# Patient Record
Sex: MALE | Race: WHITE | Employment: OTHER | URBAN - NONMETROPOLITAN AREA
[De-identification: names, ages, dates, MRNs, and addresses within clinical notes are randomized per-mention and may not be internally consistent; named-entity substitution may affect disease eponyms.]

---

## 2017-09-22 ENCOUNTER — HOSPITAL ENCOUNTER (OUTPATIENT)
Dept: MRI IMAGING | Age: 77
Discharge: HOME | End: 2017-09-22
Payer: MEDICARE

## 2017-09-22 DIAGNOSIS — S32.030D CLOSED WEDGE COMPRESSION FRACTURE OF THIRD LUMBAR VERTEBRA WITH ROUTINE HEALING: ICD-10-CM

## 2017-09-22 PROCEDURE — 72148 MRI LUMBAR SPINE W/O DYE: CPT

## 2017-10-04 ENCOUNTER — OFFICE VISIT (OUTPATIENT)
Dept: PHYSICAL MEDICINE AND REHAB | Age: 77
End: 2017-10-04
Payer: MEDICARE

## 2017-10-04 ENCOUNTER — HOSPITAL ENCOUNTER (OUTPATIENT)
Age: 77
Discharge: HOME | End: 2017-10-04
Payer: MEDICARE

## 2017-10-04 VITALS
HEART RATE: 84 BPM | HEIGHT: 74 IN | DIASTOLIC BLOOD PRESSURE: 73 MMHG | BODY MASS INDEX: 25.66 KG/M2 | WEIGHT: 199.96 LBS | SYSTOLIC BLOOD PRESSURE: 131 MMHG

## 2017-10-04 DIAGNOSIS — S32.030A COMPRESSION FRACTURE OF L3 LUMBAR VERTEBRA, CLOSED, INITIAL ENCOUNTER (HCC): ICD-10-CM

## 2017-10-04 DIAGNOSIS — M48.061 LUMBAR STENOSIS: ICD-10-CM

## 2017-10-04 DIAGNOSIS — M48.56XA COMPRESSION FRACTURE OF LUMBAR SPINE, NON-TRAUMATIC, INITIAL ENCOUNTER (HCC): ICD-10-CM

## 2017-10-04 DIAGNOSIS — M80.00XA AGE-RELATED OSTEOPOROSIS WITH CURRENT PATHOLOGICAL FRACTURE, INITIAL ENCOUNTER: ICD-10-CM

## 2017-10-04 DIAGNOSIS — M51.36 DEGENERATION OF LUMBAR INTERVERTEBRAL DISC: ICD-10-CM

## 2017-10-04 DIAGNOSIS — M48.56XA COMPRESSION FRACTURE OF LUMBAR SPINE, NON-TRAUMATIC, INITIAL ENCOUNTER (HCC): Primary | ICD-10-CM

## 2017-10-04 LAB
ALBUMIN SERPL-MCNC: 4.1 G/DL (ref 3.5–5.1)
ALP BLD-CCNC: 120 U/L (ref 38–126)
ALT SERPL-CCNC: 11 U/L (ref 11–66)
ANION GAP SERPL CALCULATED.3IONS-SCNC: 16 MEQ/L (ref 8–16)
ANISOCYTOSIS: ABNORMAL
AST SERPL-CCNC: 16 U/L (ref 5–40)
BASOPHILS # BLD: 0.3 %
BASOPHILS ABSOLUTE: 0 THOU/MM3 (ref 0–0.1)
BILIRUB SERPL-MCNC: 0.7 MG/DL (ref 0.3–1.2)
BUN BLDV-MCNC: 18 MG/DL (ref 7–22)
CALCIUM SERPL-MCNC: 10.1 MG/DL (ref 8.5–10.5)
CHLORIDE BLD-SCNC: 103 MEQ/L (ref 98–111)
CO2: 23 MEQ/L (ref 23–33)
CREAT SERPL-MCNC: 1.1 MG/DL (ref 0.4–1.2)
EOSINOPHIL # BLD: 0.7 %
EOSINOPHILS ABSOLUTE: 0.1 THOU/MM3 (ref 0–0.4)
GFR SERPL CREATININE-BSD FRML MDRD: 65 ML/MIN/1.73M2
GLUCOSE BLD-MCNC: 103 MG/DL (ref 70–108)
HCT VFR BLD CALC: 41.9 % (ref 42–52)
HEMOGLOBIN: 14.1 GM/DL (ref 14–18)
LYMPHOCYTES # BLD: 10.3 %
LYMPHOCYTES ABSOLUTE: 1.2 THOU/MM3 (ref 1–4.8)
MCH RBC QN AUTO: 30.8 PG (ref 27–31)
MCHC RBC AUTO-ENTMCNC: 33.6 GM/DL (ref 33–37)
MCV RBC AUTO: 91.6 FL (ref 80–94)
MONOCYTES # BLD: 5.5 %
MONOCYTES ABSOLUTE: 0.6 THOU/MM3 (ref 0.4–1.3)
NUCLEATED RED BLOOD CELLS: 0 /100 WBC
PDW BLD-RTO: 14.9 % (ref 11.5–14.5)
PLATELET # BLD: 246 THOU/MM3 (ref 130–400)
PMV BLD AUTO: 7.9 MCM (ref 7.4–10.4)
POTASSIUM SERPL-SCNC: 4.1 MEQ/L (ref 3.5–5.2)
RBC # BLD: 4.58 MILL/MM3 (ref 4.7–6.1)
RBC # BLD: NORMAL 10*6/UL
SEG NEUTROPHILS: 83.2 %
SEGMENTED NEUTROPHILS ABSOLUTE COUNT: 9.6 THOU/MM3 (ref 1.8–7.7)
SODIUM BLD-SCNC: 142 MEQ/L (ref 135–145)
TOTAL PROTEIN: 7.2 G/DL (ref 6.1–8)
WBC # BLD: 11.5 THOU/MM3 (ref 4.8–10.8)

## 2017-10-04 PROCEDURE — 85025 COMPLETE CBC W/AUTO DIFF WBC: CPT

## 2017-10-04 PROCEDURE — 36415 COLL VENOUS BLD VENIPUNCTURE: CPT

## 2017-10-04 PROCEDURE — 99205 OFFICE O/P NEW HI 60 MIN: CPT | Performed by: NURSE PRACTITIONER

## 2017-10-04 PROCEDURE — 93005 ELECTROCARDIOGRAM TRACING: CPT

## 2017-10-04 PROCEDURE — 80053 COMPREHEN METABOLIC PANEL: CPT

## 2017-10-04 RX ORDER — ACETAMINOPHEN AND CODEINE PHOSPHATE 300; 30 MG/1; MG/1
1 TABLET ORAL EVERY 8 HOURS PRN
Qty: 15 TABLET | Refills: 0 | Status: ON HOLD | OUTPATIENT
Start: 2017-10-04 | End: 2018-01-01 | Stop reason: HOSPADM

## 2017-10-04 RX ORDER — PANTOPRAZOLE SODIUM 40 MG/1
40 TABLET, DELAYED RELEASE ORAL DAILY
Status: ON HOLD | COMMUNITY
End: 2018-01-01

## 2017-10-04 RX ORDER — PREDNISONE 1 MG/1
10 TABLET ORAL DAILY
Status: ON HOLD | COMMUNITY
End: 2018-01-01 | Stop reason: SDUPTHER

## 2017-10-04 ASSESSMENT — ENCOUNTER SYMPTOMS
SHORTNESS OF BREATH: 1
ALLERGIC/IMMUNOLOGIC NEGATIVE: 1
GASTROINTESTINAL NEGATIVE: 1
BACK PAIN: 1

## 2017-10-04 NOTE — PROGRESS NOTES
[facility]  [facility]  [address] [facility]  Dept: [phone]  Dept Fax: [phone]  Loc: [phone]    Visit Date: 10/4/2017    Damon Hair is a 68 y.o. male who is referred for pain management evaluation and treatment per Dr. Dave Dears. CAGE and [facility]AGE[facility] Questions   1. In the last three months, have you felt you should cut down or stop drinking or using drugs? Yes []        No [x]     2. In the last three months, has anyone annoyed you or gotten on your nerves by telling you to cut down or stop drinking or using drugs? Yes []        No [x]     3. In the last three months, have you felt guilty or bad about how much you drink or use drugs? Yes []        No [x]     4. In the last three months, have you been waking up wanting to have an alcoholic drink or use drugs? Yes []        No [x]        Opioid Risk Tool:  Clinician Form       1. Family History of Substance Abuse: Female Male    Alcohol   []1   []3    Illegal drugs   []2   []3    Prescription drugs     []4   []4   2. Personal History of Substance Abuse:          Alcohol   []3   []3    Illegal drugs   []4   []4    Prescription drugs     []5   []5   3. Age (jos box if between 12 and 39):     []1   []1   4. History of Preadolescent Sexual Abuse:     []3   []0   5. Psychological Disease:      Attention deficit disorder, obsessive-compulsive disorder, bipolar, schizophrenia   []2   []2      Depression     []1   []1    Scoring Totals       Total Score  Low Risk  Moderate Risk  High Risk   Risk Category   0 - 3   4 - 7   8 or Above      Patient states symptoms interfere with:  A.  General Activity:  yes   B. Mood: yes    C. Walking Ability:   yes   D. Normal Work (Includes both work outside the home and housework):   yes    E.  Relations with Other People:  no   F. Sleep:   no   G.  Enjoyment of Life:  yes     HPI:     Chief Complaint: Lower back pain     HPI  Lower back pain- Started in never used smokeless tobacco. He reports that he drinks alcohol. He reports that he does not use illicit drugs. Medications    Current Outpatient Prescriptions:     pantoprazole (PROTONIX) 40 MG tablet, Take 40 mg by mouth daily, Disp: , Rfl:     predniSONE (DELTASONE) 5 MG tablet, Take 5 mg by mouth daily, Disp: , Rfl:     Mycophenolate Mofetil (CELLCEPT PO), Take by mouth daily, Disp: , Rfl:     acetaminophen-codeine (TYLENOL #3) 300-30 MG per tablet, Take 1 tablet by mouth every 8 hours as needed for Pain, Disp: 15 tablet, Rfl: 0    amLODIPine (NORVASC) 10 MG tablet, Take 10 mg by mouth daily. , Disp: , Rfl:     tiotropium (SPIRIVA HANDIHALER) 18 MCG inhalation capsule, Inhale 1 capsule into the lungs daily for 90 days. 1 capsule via inhalation daily. , Disp: 90 capsule, Rfl: 3    albuterol (PROAIR HFA) 108 (90 BASE) MCG/ACT inhaler, Inhale 2 puffs into the lungs every 6 hours as needed for Wheezing or Shortness of Breath. :, Disp: 1 Inhaler, Rfl: 11    Subjective:      Review of Systems   HENT: Negative. Eyes: Positive for visual disturbance. Wears glasses   Respiratory: Positive for shortness of breath. History of tobacco use    Cardiovascular: Negative. Gastrointestinal: Negative. Endocrine: Negative. Genitourinary: Negative. Musculoskeletal: Positive for arthralgias, back pain and gait problem. Skin: Negative. Allergic/Immunologic: Negative. Neurological: Positive for weakness. Negative for numbness. Hematological: Negative. Psychiatric/Behavioral: Negative. Objective:     Vitals:    10/04/17 0740   BP: 131/73   Site: Left Arm   Position: Sitting   Pulse: 84   Weight: 199 lb 15.3 oz (90.7 kg)   Height: 6' 2.02\" (1.88 m)       Physical Exam   Constitutional: He is oriented to person, place, and time. He appears well-developed and well-nourished. HENT:   Head: Normocephalic and atraumatic.    Right Ear: External ear normal.   Left Ear: External ear normal.   Mouth/Throat: Oropharynx is clear and moist.   Eyes: Conjunctivae and EOM are normal. Pupils are equal, round, and reactive to light. Right eye exhibits no discharge. Left eye exhibits no discharge. No scleral icterus. Neck: Normal range of motion. Neck supple. No tracheal deviation present. No thyromegaly present. Cardiovascular: Normal rate, regular rhythm, normal heart sounds and intact distal pulses. Exam reveals no gallop and no friction rub. No murmur heard. Pulmonary/Chest: Effort normal and breath sounds normal. No respiratory distress. He has no wheezes. Abdominal: Soft. Bowel sounds are normal. He exhibits no distension. There is no tenderness. Musculoskeletal: He exhibits tenderness. Right shoulder: He exhibits normal range of motion. Left shoulder: He exhibits no deformity. Right elbow: He exhibits normal range of motion. Left elbow: He exhibits normal range of motion. Right wrist: He exhibits normal range of motion. Left wrist: He exhibits normal range of motion. Right hip: He exhibits normal range of motion. Left hip: He exhibits normal range of motion. Right knee: No tenderness found. Left knee: No tenderness found. Cervical back: He exhibits normal range of motion and no bony tenderness. Thoracic back: He exhibits normal range of motion, no tenderness and no bony tenderness. Lumbar back: He exhibits decreased range of motion, tenderness, bony tenderness and pain. He exhibits no swelling and no edema. Right upper arm: He exhibits no tenderness and no bony tenderness. Left upper arm: He exhibits no tenderness and no bony tenderness. Left forearm: He exhibits no tenderness. Right upper leg: He exhibits no tenderness. Left upper leg: He exhibits no tenderness. Right lower leg: He exhibits no tenderness.         Left lower leg: He exhibits no tried:  · PT: Yes,  any benefit? No, how many weeks? 5 weeks, last date done: 8/2017  · NSAIDs: No  · Chiropractic: No  · Muscle relaxants: No  · Narcotics: No  · Spine surgeon consult: Yes    Meds. Prescribed:   Orders Placed This Encounter   Medications    acetaminophen-codeine (TYLENOL #3) 300-30 MG per tablet     Sig: Take 1 tablet by mouth every 8 hours as needed for Pain     Dispense:  15 tablet     Refill:  0       Return for Kyphoplasty @ L3. , follow up after procedure. Time spent with patient was 60 minutes  minutes more than 50% was spent  Counseling/coordinated the patient's care.     Electronically signed by Amanda Laurent CNP on 10/4/2017 at 8:28 AM

## 2017-10-05 LAB
EKG ATRIAL RATE: 82 BPM
EKG P AXIS: 57 DEGREES
EKG P-R INTERVAL: 172 MS
EKG Q-T INTERVAL: 390 MS
EKG QRS DURATION: 88 MS
EKG QTC CALCULATION (BAZETT): 455 MS
EKG R AXIS: -12 DEGREES
EKG T AXIS: 42 DEGREES
EKG VENTRICULAR RATE: 82 BPM

## 2017-10-20 ENCOUNTER — APPOINTMENT (OUTPATIENT)
Dept: GENERAL RADIOLOGY | Age: 77
End: 2017-10-20
Attending: PAIN MEDICINE
Payer: MEDICARE

## 2017-10-20 ENCOUNTER — ANESTHESIA EVENT (OUTPATIENT)
Dept: OPERATING ROOM | Age: 77
End: 2017-10-20
Payer: MEDICARE

## 2017-10-20 ENCOUNTER — ANESTHESIA (OUTPATIENT)
Dept: OPERATING ROOM | Age: 77
End: 2017-10-20
Payer: MEDICARE

## 2017-10-20 ENCOUNTER — HOSPITAL ENCOUNTER (OUTPATIENT)
Age: 77
Setting detail: OUTPATIENT SURGERY
Discharge: HOME | End: 2017-10-20
Attending: PAIN MEDICINE | Admitting: PAIN MEDICINE
Payer: MEDICARE

## 2017-10-20 VITALS
RESPIRATION RATE: 17 BRPM | DIASTOLIC BLOOD PRESSURE: 83 MMHG | OXYGEN SATURATION: 99 % | SYSTOLIC BLOOD PRESSURE: 159 MMHG

## 2017-10-20 VITALS
WEIGHT: 196.4 LBS | SYSTOLIC BLOOD PRESSURE: 117 MMHG | HEART RATE: 94 BPM | RESPIRATION RATE: 21 BRPM | TEMPERATURE: 98.3 F | BODY MASS INDEX: 25.21 KG/M2 | HEIGHT: 74 IN | OXYGEN SATURATION: 97 % | DIASTOLIC BLOOD PRESSURE: 62 MMHG

## 2017-10-20 PROCEDURE — 3700000001 HC ADD 15 MINUTES (ANESTHESIA): Performed by: PAIN MEDICINE

## 2017-10-20 PROCEDURE — C1713 ANCHOR/SCREW BN/BN,TIS/BN: HCPCS | Performed by: PAIN MEDICINE

## 2017-10-20 PROCEDURE — 3600000014 HC SURGERY LEVEL 4 ADDTL 15MIN: Performed by: PAIN MEDICINE

## 2017-10-20 PROCEDURE — 2500000003 HC RX 250 WO HCPCS: Performed by: NURSE ANESTHETIST, CERTIFIED REGISTERED

## 2017-10-20 PROCEDURE — 2720000010 HC SURG SUPPLY STERILE: Performed by: PAIN MEDICINE

## 2017-10-20 PROCEDURE — 2580000003 HC RX 258: Performed by: NURSE ANESTHETIST, CERTIFIED REGISTERED

## 2017-10-20 PROCEDURE — 7100000010 HC PHASE II RECOVERY - FIRST 15 MIN: Performed by: PAIN MEDICINE

## 2017-10-20 PROCEDURE — 2500000003 HC RX 250 WO HCPCS

## 2017-10-20 PROCEDURE — 88305 TISSUE EXAM BY PATHOLOGIST: CPT

## 2017-10-20 PROCEDURE — 22514 PERQ VERTEBRAL AUGMENTATION: CPT | Performed by: PAIN MEDICINE

## 2017-10-20 PROCEDURE — 7100000011 HC PHASE II RECOVERY - ADDTL 15 MIN: Performed by: PAIN MEDICINE

## 2017-10-20 PROCEDURE — 7100000000 HC PACU RECOVERY - FIRST 15 MIN: Performed by: PAIN MEDICINE

## 2017-10-20 PROCEDURE — 6360000002 HC RX W HCPCS

## 2017-10-20 PROCEDURE — 3700000000 HC ANESTHESIA ATTENDED CARE: Performed by: PAIN MEDICINE

## 2017-10-20 PROCEDURE — 3209999900 FLUORO FOR SURGICAL PROCEDURES

## 2017-10-20 PROCEDURE — 88311 DECALCIFY TISSUE: CPT

## 2017-10-20 PROCEDURE — 7100000001 HC PACU RECOVERY - ADDTL 15 MIN: Performed by: PAIN MEDICINE

## 2017-10-20 PROCEDURE — 3600000004 HC SURGERY LEVEL 4 BASE: Performed by: PAIN MEDICINE

## 2017-10-20 PROCEDURE — C1894 INTRO/SHEATH, NON-LASER: HCPCS | Performed by: PAIN MEDICINE

## 2017-10-20 PROCEDURE — C1889 IMPLANT/INSERT DEVICE, NOC: HCPCS | Performed by: PAIN MEDICINE

## 2017-10-20 PROCEDURE — 6360000002 HC RX W HCPCS: Performed by: NURSE ANESTHETIST, CERTIFIED REGISTERED

## 2017-10-20 PROCEDURE — 6360000004 HC RX CONTRAST MEDICATION: Performed by: PAIN MEDICINE

## 2017-10-20 DEVICE — BONE CEMENT C01B HV-R WITH MIXER  US
Type: IMPLANTABLE DEVICE | Status: FUNCTIONAL
Brand: KYPHON® HV-R® BONE CEMENT AND KYPHON® MIXER PACK

## 2017-10-20 RX ORDER — SODIUM CHLORIDE 9 MG/ML
INJECTION, SOLUTION INTRAVENOUS CONTINUOUS PRN
Status: DISCONTINUED | OUTPATIENT
Start: 2017-10-20 | End: 2017-10-20 | Stop reason: SDUPTHER

## 2017-10-20 RX ORDER — FENTANYL CITRATE 50 UG/ML
INJECTION, SOLUTION INTRAMUSCULAR; INTRAVENOUS PRN
Status: DISCONTINUED | OUTPATIENT
Start: 2017-10-20 | End: 2017-10-20 | Stop reason: SDUPTHER

## 2017-10-20 RX ORDER — ROCURONIUM BROMIDE 10 MG/ML
INJECTION, SOLUTION INTRAVENOUS PRN
Status: DISCONTINUED | OUTPATIENT
Start: 2017-10-20 | End: 2017-10-20 | Stop reason: SDUPTHER

## 2017-10-20 RX ORDER — LIDOCAINE HYDROCHLORIDE 20 MG/ML
INJECTION, SOLUTION INFILTRATION; PERINEURAL PRN
Status: DISCONTINUED | OUTPATIENT
Start: 2017-10-20 | End: 2017-10-20 | Stop reason: SDUPTHER

## 2017-10-20 RX ORDER — ONDANSETRON 2 MG/ML
INJECTION INTRAMUSCULAR; INTRAVENOUS PRN
Status: DISCONTINUED | OUTPATIENT
Start: 2017-10-20 | End: 2017-10-20 | Stop reason: SDUPTHER

## 2017-10-20 RX ORDER — DEXAMETHASONE SODIUM PHOSPHATE 4 MG/ML
INJECTION, SOLUTION INTRA-ARTICULAR; INTRALESIONAL; INTRAMUSCULAR; INTRAVENOUS; SOFT TISSUE PRN
Status: DISCONTINUED | OUTPATIENT
Start: 2017-10-20 | End: 2017-10-20 | Stop reason: SDUPTHER

## 2017-10-20 RX ORDER — HYDROCODONE BITARTRATE AND ACETAMINOPHEN 5; 325 MG/1; MG/1
TABLET ORAL
Qty: 45 TABLET | Refills: 0 | Status: SHIPPED | OUTPATIENT
Start: 2017-10-20 | End: 2018-01-01 | Stop reason: SDUPTHER

## 2017-10-20 RX ORDER — SUCCINYLCHOLINE CHLORIDE 20 MG/ML
INJECTION INTRAMUSCULAR; INTRAVENOUS PRN
Status: DISCONTINUED | OUTPATIENT
Start: 2017-10-20 | End: 2017-10-20 | Stop reason: SDUPTHER

## 2017-10-20 RX ORDER — NEOSTIGMINE METHYLSULFATE 1 MG/ML
INJECTION, SOLUTION INTRAVENOUS PRN
Status: DISCONTINUED | OUTPATIENT
Start: 2017-10-20 | End: 2017-10-20 | Stop reason: SDUPTHER

## 2017-10-20 RX ORDER — MIDAZOLAM HYDROCHLORIDE 1 MG/ML
INJECTION INTRAMUSCULAR; INTRAVENOUS PRN
Status: DISCONTINUED | OUTPATIENT
Start: 2017-10-20 | End: 2017-10-20 | Stop reason: SDUPTHER

## 2017-10-20 RX ORDER — PROPOFOL 10 MG/ML
INJECTION, EMULSION INTRAVENOUS PRN
Status: DISCONTINUED | OUTPATIENT
Start: 2017-10-20 | End: 2017-10-20 | Stop reason: SDUPTHER

## 2017-10-20 RX ORDER — GLYCOPYRROLATE 0.2 MG/ML
INJECTION INTRAMUSCULAR; INTRAVENOUS PRN
Status: DISCONTINUED | OUTPATIENT
Start: 2017-10-20 | End: 2017-10-20 | Stop reason: SDUPTHER

## 2017-10-20 RX ADMIN — HYDROCORTISONE SODIUM SUCCINATE 100 MG: 100 INJECTION, POWDER, FOR SOLUTION INTRAMUSCULAR; INTRAVENOUS at 07:48

## 2017-10-20 RX ADMIN — CEFAZOLIN SODIUM 2 G: 2 SOLUTION INTRAVENOUS at 07:51

## 2017-10-20 RX ADMIN — PHENYLEPHRINE HYDROCHLORIDE 100 MCG: 10 INJECTION INTRAMUSCULAR; INTRAVENOUS; SUBCUTANEOUS at 07:53

## 2017-10-20 RX ADMIN — PROPOFOL 120 MG: 10 INJECTION, EMULSION INTRAVENOUS at 07:45

## 2017-10-20 RX ADMIN — PHENYLEPHRINE HYDROCHLORIDE 100 MCG: 10 INJECTION INTRAMUSCULAR; INTRAVENOUS; SUBCUTANEOUS at 08:30

## 2017-10-20 RX ADMIN — ONDANSETRON 4 MG: 2 INJECTION INTRAMUSCULAR; INTRAVENOUS at 07:52

## 2017-10-20 RX ADMIN — Medication 5 MG: at 08:25

## 2017-10-20 RX ADMIN — SUCCINYLCHOLINE CHLORIDE 100 MG: 20 INJECTION, SOLUTION INTRAMUSCULAR; INTRAVENOUS at 07:45

## 2017-10-20 RX ADMIN — Medication 10 MG: at 08:09

## 2017-10-20 RX ADMIN — GLYCOPYRROLATE 0.4 MG: 0.2 INJECTION, SOLUTION INTRAMUSCULAR; INTRAVENOUS at 08:54

## 2017-10-20 RX ADMIN — Medication 20 MG: at 07:59

## 2017-10-20 RX ADMIN — PHENYLEPHRINE HYDROCHLORIDE 100 MCG: 10 INJECTION INTRAMUSCULAR; INTRAVENOUS; SUBCUTANEOUS at 08:05

## 2017-10-20 RX ADMIN — Medication 5 MG: at 08:30

## 2017-10-20 RX ADMIN — PHENYLEPHRINE HYDROCHLORIDE 100 MCG: 10 INJECTION INTRAMUSCULAR; INTRAVENOUS; SUBCUTANEOUS at 07:48

## 2017-10-20 RX ADMIN — FENTANYL CITRATE 100 MCG: 50 INJECTION INTRAMUSCULAR; INTRAVENOUS at 07:43

## 2017-10-20 RX ADMIN — MIDAZOLAM HYDROCHLORIDE 2 MG: 1 INJECTION, SOLUTION INTRAMUSCULAR; INTRAVENOUS at 07:43

## 2017-10-20 RX ADMIN — LIDOCAINE HYDROCHLORIDE 100 MG: 20 INJECTION, SOLUTION INFILTRATION; PERINEURAL at 07:45

## 2017-10-20 RX ADMIN — NEOSTIGMINE METHYLSULFATE 3 MG: 1 INJECTION, SOLUTION INTRAVENOUS at 08:54

## 2017-10-20 RX ADMIN — DEXAMETHASONE SODIUM PHOSPHATE 8 MG: 4 INJECTION, SOLUTION INTRAMUSCULAR; INTRAVENOUS at 07:52

## 2017-10-20 RX ADMIN — PHENYLEPHRINE HYDROCHLORIDE 100 MCG: 10 INJECTION INTRAMUSCULAR; INTRAVENOUS; SUBCUTANEOUS at 08:16

## 2017-10-20 RX ADMIN — SODIUM CHLORIDE: 9 INJECTION, SOLUTION INTRAVENOUS at 07:41

## 2017-10-20 RX ADMIN — PHENYLEPHRINE HYDROCHLORIDE 100 MCG: 10 INJECTION INTRAMUSCULAR; INTRAVENOUS; SUBCUTANEOUS at 08:25

## 2017-10-20 ASSESSMENT — PULMONARY FUNCTION TESTS
PIF_VALUE: 15
PIF_VALUE: 17
PIF_VALUE: 18
PIF_VALUE: 17
PIF_VALUE: 15
PIF_VALUE: 14
PIF_VALUE: 18
PIF_VALUE: 14
PIF_VALUE: 17
PIF_VALUE: 18
PIF_VALUE: 0
PIF_VALUE: 17
PIF_VALUE: 18
PIF_VALUE: 1
PIF_VALUE: 18
PIF_VALUE: 17
PIF_VALUE: 20
PIF_VALUE: 15
PIF_VALUE: 0
PIF_VALUE: 3
PIF_VALUE: 3
PIF_VALUE: 0
PIF_VALUE: 16
PIF_VALUE: 18
PIF_VALUE: 16
PIF_VALUE: 18
PIF_VALUE: 17
PIF_VALUE: 17
PIF_VALUE: 0
PIF_VALUE: 17
PIF_VALUE: 16
PIF_VALUE: 17
PIF_VALUE: 16
PIF_VALUE: 16
PIF_VALUE: 3
PIF_VALUE: 18
PIF_VALUE: 3
PIF_VALUE: 18
PIF_VALUE: 18
PIF_VALUE: 0
PIF_VALUE: 18
PIF_VALUE: 17
PIF_VALUE: 20
PIF_VALUE: 17
PIF_VALUE: 17
PIF_VALUE: 4
PIF_VALUE: 14
PIF_VALUE: 17
PIF_VALUE: 16
PIF_VALUE: 18
PIF_VALUE: 18
PIF_VALUE: 3
PIF_VALUE: 17
PIF_VALUE: 18
PIF_VALUE: 17
PIF_VALUE: 19
PIF_VALUE: 18
PIF_VALUE: 17
PIF_VALUE: 15
PIF_VALUE: 17
PIF_VALUE: 18
PIF_VALUE: 18
PIF_VALUE: 3
PIF_VALUE: 0
PIF_VALUE: 17
PIF_VALUE: 18
PIF_VALUE: 18

## 2017-10-20 ASSESSMENT — PAIN - FUNCTIONAL ASSESSMENT: PAIN_FUNCTIONAL_ASSESSMENT: 0-10

## 2017-10-20 ASSESSMENT — PAIN DESCRIPTION - DESCRIPTORS: DESCRIPTORS: ACHING

## 2017-10-20 ASSESSMENT — PAIN SCALES - GENERAL: PAINLEVEL_OUTOF10: 0

## 2017-10-20 NOTE — ANESTHESIA PRE PROCEDURE
Department of Anesthesiology  Preprocedure Note       Name:  Drake Acosta   Age:  68 y.o.  :  1940                                          MRN:  [id number]         Date:  10/20/2017      Surgeon: Anastasia Bedoya):  Remington Dolan MD    Procedure: Procedure(s):  KYPHOPLASTY @ L3    Medications prior to admission:   Prior to Admission medications    Medication Sig Start Date End Date Taking? Authorizing Provider   pantoprazole (PROTONIX) 40 MG tablet Take 40 mg by mouth daily   Yes Historical Provider, MD   predniSONE (DELTASONE) 5 MG tablet Take 5 mg by mouth daily   Yes Historical Provider, MD   Mycophenolate Mofetil (CELLCEPT PO) Take 1,000 mg by mouth 2 times daily    Yes Historical Provider, MD   acetaminophen-codeine (TYLENOL #3) 300-30 MG per tablet Take 1 tablet by mouth every 8 hours as needed for Pain 10/4/17  Yes Eb Moment Marzec, CNP   amLODIPine (NORVASC) 10 MG tablet Take 10 mg by mouth daily. Yes Historical Provider, MD   tiotropium (SPIRIVA HANDIHALER) 18 MCG inhalation capsule Inhale 1 capsule into the lungs daily for 90 days. 1 capsule via inhalation daily. 4/15/13 7/14/13  Manda Montez MD   albuterol (PROAIR HFA) 108 (90 BASE) MCG/ACT inhaler Inhale 2 puffs into the lungs every 6 hours as needed for Wheezing or Shortness of Breath. : 13  Manda Montez MD       Current medications:    No current facility-administered medications for this encounter.         Allergies:  No Known Allergies    Problem List:    Patient Active Problem List   Diagnosis Code    Cerumen impaction H61.20    ETD (eustachian tube dysfunction) H69.80    Chronic maxillary sinusitis J32.0    Deviated nasal septum J34.2       Past Medical History:        Diagnosis Date    Anemia     [facility] [address]     [facility]     GERD (gastroesophageal reflux disease)     High triglycerides     Hx of blood clots     Hypertension     [facility]uropathy ([facility]     Pulmonary fibrosis ([facility]        Past Surgical History:        Procedure Laterality Date    ABDOMINAL AORTIC ANEURYSM REPAIR  2000    COLONOSCOPY      HERNIA REPAIR      x3       Social History:    Social History   Substance Use Topics    Smoking status: Former Smoker     Years: 35.00     Quit date: 1/1/1989    Smokeless tobacco: Never Used    Alcohol use Yes      Comment: Seldomly                                Counseling given: Not Answered      Vital Signs (Current):   Vitals:    10/20/17 0634   BP: (!) 150/77   Pulse: 86   Resp: 16   Temp: 98 °F (36.7 °C)   TempSrc: Temporal   SpO2: 95%   Weight: 196 lb 6.4 oz (89.1 kg)   Height: 6' 2\" (1.88 m)                                              BP Readings from Last 3 Encounters:   10/20/17 (!) 150/77   10/04/17 131/73   02/06/15 122/66       NPO Status: Time of last liquid consumption: 0515 (sip of water with meds)                        Time of last solid consumption: 2000                        Date of last liquid consumption: 10/20/17                        Date of last solid food consumption: 10/20/17    BMI:   Wt Readings from Last 3 Encounters:   10/20/17 196 lb 6.4 oz (89.1 kg)   10/04/17 199 lb 15.3 oz (90.7 kg)   02/06/15 200 lb (90.7 kg)     Body mass index is 25.22 kg/m².     CBC:   Lab Results   Component Value Date    WBC 11.5 10/04/2017    RBC 4.58 10/04/2017    HGB 14.1 10/04/2017    HCT 41.9 10/04/2017    MCV 91.6 10/04/2017    RDW 14.9 10/04/2017     10/04/2017       CMP:   Lab Results   Component Value Date     10/04/2017    K 4.1 10/04/2017     10/04/2017    CO2 23 10/04/2017    BUN 18 10/04/2017    CREATININE 1.1 10/04/2017    LABGLOM 65 10/04/2017    GLUCOSE 103 10/04/2017    PROT 7.2 10/04/2017    CALCIUM 10.1 10/04/2017    BILITOT 0.7 10/04/2017    ALKPHOS 120 10/04/2017    AST 16 10/04/2017    ALT 11 10/04/2017       POC Tests: No results for input(s): POCGLU, POCNA, POCK, POCCL, POCBUN, POCHEMO,

## 2017-10-20 NOTE — OP NOTE
endotracheal anesthesia and then was placed on the Tr table with all pressure points well padded. To facilitate the procedure biplanar fluoroscopy was used and by adjusting the angles of fluoroscopy both AP and lateral views of corresponding vertebral body was optimized. Skin over the back was prepped with antiseptic solution and the procedure was done under strict aspetic precautions. Skin and deep tissues up to the periosteum of pedicle was infilitrated with  --ml of 0.5% Marcaine with epinephrine. Then using #11 blade a small skin incission was made. The the working canula with trochar in place was inserted such that it lies over the lateral aspect of the pedicle. Then it was tapped slowly through the pedicle under constant fluoroscopic surveillance making sure that the medial border of the pedicle was not violated at any time. Then a bone biopsy canula was inserted through the canula and a bone biopsy was obtained. Bone was curetted as it was sclerotic. Then the Kyphon bone tamp was inserted through the working canula. This was done bipedicularly in similar fashion. Good placements were confirmed with fluoroscopy. The bone tamps  were then serially inflated, to reexpand the vertebral bodies, and restore more normal anatomy. In the meantime polymethylmethacrylate was mixed as per the protocol and Kyphon bone fillers were filled with it. Then they were immersed in warm water to obtain adequate consistency. The bone tamps were withdrawn, and bone cement was placed in both balloon cavities, using fluoroscopic guidance. The following are the volumes of contrast used to inflate the bone tamps and the volume of bone cement injected:           Level L3  left--- ----3 Ml of Polymethyl methacrylate                  Once the cement had set and was seen to be in good position by fluoroscopy, the working canula and bone fillers were removed. Final hemostasis was secured by applying pressure.   The incision were closed with 3-0 monocryl , followed by sterile dressings. The patient was then turned supine onto the bed and awakened from anesthesia. The patient was extubated in the operating room, and taken to the recovery room in stable condition. The patient tolerated surgery without any complications. Estimated Blood Loss:   <10 ml. Sponge, needle, and instrument counts were correct at the end of the case.       Electronically signed by Ciera Simmons MD on 10/20/2017 at 9:29 AM

## 2017-10-20 NOTE — ANESTHESIA POSTPROCEDURE EVALUATION
Department of Anesthesiology  Postprocedure Note    Patient: Drake Acosta  MRN: [id number]  YOB: 1940  Date of evaluation: 10/20/2017  Time:  10:11 AM     Procedure Summary     Date:  10/20/17 Room / Location:  [facility] 02 / [address]    Anesthesia Start:  0741 Anesthesia Stop:  3439    Procedure:  KYPHOPLASTY @ L3 LEFT (N/A Back) Diagnosis:  (COMPRESSION FRACTURE LUMBAR SPINE)    Surgeon:  Remington Dolan MD Responsible Provider:  Nick Guillory MD    Anesthesia Type:  general ASA Status:  3          Anesthesia Type: general    Dionne Phase I: Dionne Score: 9    Dionne Phase II: Dionne Score: 10    Last vitals: Reviewed and per EMR flowsheets.        Anesthesia Post Evaluation    Patient location during evaluation: PACU  Patient participation: complete - patient participated  Level of consciousness: awake  Airway patency: patent  Nausea & Vomiting: no vomiting and no nausea  Complications: no  Cardiovascular status: hemodynamically stable  Respiratory status: acceptable  Hydration status: stable

## 2017-10-24 ENCOUNTER — TELEPHONE (OUTPATIENT)
Dept: PHYSICAL MEDICINE AND REHAB | Age: 77
End: 2017-10-24

## 2017-10-24 NOTE — TELEPHONE ENCOUNTER
Pt. Wife called and wanted to verify that having black stools would not be a side effect from the kyphoplasty that was performed on Friday 10/20/2017. Please advise.

## 2017-11-02 ENCOUNTER — OFFICE VISIT (OUTPATIENT)
Dept: PHYSICAL MEDICINE AND REHAB | Age: 77
End: 2017-11-02
Payer: MEDICARE

## 2017-11-02 VITALS
SYSTOLIC BLOOD PRESSURE: 145 MMHG | WEIGHT: 196.43 LBS | BODY MASS INDEX: 25.21 KG/M2 | HEIGHT: 74 IN | DIASTOLIC BLOOD PRESSURE: 79 MMHG | HEART RATE: 107 BPM

## 2017-11-02 DIAGNOSIS — M47.816 LUMBAR FACET ARTHROPATHY: ICD-10-CM

## 2017-11-02 DIAGNOSIS — M80.00XA AGE-RELATED OSTEOPOROSIS WITH CURRENT PATHOLOGICAL FRACTURE, INITIAL ENCOUNTER: ICD-10-CM

## 2017-11-02 DIAGNOSIS — M51.36 DEGENERATION OF LUMBAR INTERVERTEBRAL DISC: ICD-10-CM

## 2017-11-02 DIAGNOSIS — M48.56XA COMPRESSION FRACTURE OF LUMBAR SPINE, NON-TRAUMATIC, INITIAL ENCOUNTER (HCC): ICD-10-CM

## 2017-11-02 DIAGNOSIS — M47.816 SPONDYLOSIS OF LUMBAR REGION WITHOUT MYELOPATHY OR RADICULOPATHY: Primary | ICD-10-CM

## 2017-11-02 PROCEDURE — 99213 OFFICE O/P EST LOW 20 MIN: CPT | Performed by: NURSE PRACTITIONER

## 2017-11-02 ASSESSMENT — ENCOUNTER SYMPTOMS
ALLERGIC/IMMUNOLOGIC NEGATIVE: 1
BACK PAIN: 1
SHORTNESS OF BREATH: 1
GASTROINTESTINAL NEGATIVE: 1

## 2017-11-02 NOTE — PROGRESS NOTES
[facility]  [facility]  [address]  Dept: [phone]  Dept Fax: [phone]: [phone]    Visit Date: 11/2/2017    Functionality Assessment/Goals Worksheet     On a scale of 0 (Does not Interfere) to 10 (Completely Interferes)     1. Which number describes how during the past week pain has interfered with       the following:  A. General Activity:  9  B. Mood: 8  C. Walking Ability:  1  D. Normal Work (Includes both work outside the home and housework):  1  E. Relations with Other People:   1  F. Sleep:   1  G. Enjoyment of Life:   1    2. Patient Prefers to Take their Pain Medications:     []  On a regular basis   [x]  Only when necessary    []  Does not take pain medications    3. What are the Patient's Goals/Expectations for Visiting Pain Management? []  Learn about my pain    []  Receive Medication   []  Physical Therapy     []  Treat Depression   []  Receive Injections    []  Treat Sleep   []  Deal with Anxiety and Stress   []  Treat Opoid Dependence/Addiction   []  Other:      HPI:   Damon Hair is a 68 y.o. male is here today for    Chief Complaint:  Lower back pain     Wife present   HPI   FU Kyphoplasty at L3 from 10/20/2017. Receiving a lot of relief from Kyphoplasty of about 70%. Able to move better. Patient and wife very pleased. Not much concerns of pain. Continues to have some lower back pain radiating in buttocks. Had some starry black stools after procedure which resolved. Has been fatigued lately     Not using any pain medication   Kyphoplasty site checked no sign of infection and helping well   Medications reviewed. Patient denies  side effects with medications. He denies any ER visits since last visit. Pain scale with out pain medications is 3-4/10. L-MRI   There is an acute compression fracture of L3. The height loss is 75 %. There is a diffuse edema on the STIR sequence.  There is low signal in the T1-weighted images. There is no associated underlying mass lesion. There are no retropulsed bone fragments. The posterior cortex of L3 does bow into the spinal canal. There is mild spinal canal stenosis posterior to the L3 vertebral body. Abnormal signal does not extend into the pedicles.       The lumbar vertebral bodies are normally aligned.  There is no suspicious marrow signal. There are no other compression fractures.  No pars defects are noted.  There is mild disc desiccation at the L3-4 and L4-5 levels.       The distal spinal cord, conus medullaris and cauda equina are normal.        There are no gross abnormalities in the visualized aspects of the distal thoracic spine.       On the axial images, at L1-L2, there are mild facet degenerative changes. There is no spinal canal or foraminal stenosis.       At L2-L3, there are mild facet degenerative changes. There is some thickening of the ligamentum flavum. There is mild spinal canal stenosis. There is no focal disc abnormality. There is no significant foraminal stenosis.       At L3-L4, there is moderate stenosis of the thecal sac. This is stable. There is a diffusely bulging disc. There are facet degenerative changes. There is thickening of ligamentum flavum. There is moderate bilateral foraminal stenosis.       At L4-L5, there is diffuse disc bulge. There are moderate severity facet degenerative changes. There is moderate spinal canal stenosis. There is stenosis of the left lateral recess. There is mild right and moderate severity left foraminal stenosis.       At L5-S1, there are mild facet degenerative changes. There is no spinal canal stenosis. There is no foraminal stenosis.       There is some edema in the paraspinal soft tissues adjacent to the L3 vertebral body. This is seen on the axial T2-weighted images and best seen on the sagittal STIR sequence.           Impression       1. Acute compression fracture of L3 with 75 % height loss.  This 1 capsule via inhalation daily. , Disp: 90 capsule, Rfl: 3    albuterol (PROAIR HFA) 108 (90 BASE) MCG/ACT inhaler, Inhale 2 puffs into the lungs every 6 hours as needed for Wheezing or Shortness of Breath. :, Disp: 1 Inhaler, Rfl: 11    Subjective:      Review of Systems   HENT: Negative. Eyes: Positive for visual disturbance. Wears glasses   Respiratory: Positive for shortness of breath. History of tobacco use    Cardiovascular: Negative. Gastrointestinal: Negative. Endocrine: Negative. Genitourinary: Negative. Musculoskeletal: Positive for arthralgias, back pain, gait problem and myalgias. Skin: Negative. Allergic/Immunologic: Negative. Neurological: Positive for weakness. Negative for numbness. Hematological: Negative. Psychiatric/Behavioral: Negative. Objective:     Vitals:    11/02/17 1136   BP: (!) 145/79   Site: Left Arm   Position: Sitting   Pulse: 107   Weight: 196 lb 6.9 oz (89.1 kg)   Height: 6' 2.02\" (1.88 m)       Physical Exam   Constitutional: He is oriented to person, place, and time. He appears well-developed and well-nourished. HENT:   Head: Normocephalic and atraumatic. Right Ear: External ear normal.   Left Ear: External ear normal.   Mouth/Throat: Oropharynx is clear and moist.   Eyes: Conjunctivae and EOM are normal. Pupils are equal, round, and reactive to light. Right eye exhibits no discharge. Left eye exhibits no discharge. No scleral icterus. Neck: Normal range of motion. Neck supple. No tracheal deviation present. No thyromegaly present. Cardiovascular: Normal rate, regular rhythm, normal heart sounds and intact distal pulses. Exam reveals no gallop and no friction rub. No murmur heard. Pulmonary/Chest: Effort normal and breath sounds normal. No respiratory distress. He has no wheezes. Abdominal: Soft. Bowel sounds are normal. He exhibits no distension. There is no tenderness. Musculoskeletal: He exhibits tenderness. Right shoulder: He exhibits normal range of motion. Left shoulder: He exhibits no deformity. Right elbow: He exhibits normal range of motion. Left elbow: He exhibits normal range of motion. Right wrist: He exhibits normal range of motion. Left wrist: He exhibits normal range of motion. Right hip: He exhibits normal range of motion. Left hip: He exhibits normal range of motion. Right knee: No tenderness found. Left knee: No tenderness found. Cervical back: He exhibits normal range of motion and no bony tenderness. Thoracic back: He exhibits normal range of motion, no tenderness and no bony tenderness. Lumbar back: He exhibits decreased range of motion, tenderness, bony tenderness and pain. He exhibits no swelling and no edema. Right upper arm: He exhibits no tenderness and no bony tenderness. Left upper arm: He exhibits no tenderness and no bony tenderness. Left forearm: He exhibits no tenderness. Right upper leg: He exhibits no tenderness. Left upper leg: He exhibits no tenderness. Right lower leg: He exhibits no tenderness. Left lower leg: He exhibits no tenderness. Legs:  Decreased ROM lumbar spine    Neurological: He is alert and oriented to person, place, and time. He has normal strength and normal reflexes. A cranial nerve deficit and sensory deficit is present. Coordination normal.   Reflex Scores:       Tricep reflexes are 2+ on the right side and 2+ on the left side. Bicep reflexes are 2+ on the right side and 2+ on the left side. Brachioradialis reflexes are 2+ on the right side and 2+ on the left side. Patellar reflexes are 2+ on the right side and 2+ on the left side. Achilles reflexes are 2+ on the right side and 2+ on the left side. Skin: Skin is warm and dry. Psychiatric: He has a normal mood and affect. Assessment:     1. Spondylosis of lumbar region without myelopathy or radiculopathy    2. Degeneration of lumbar intervertebral disc    3. Lumbar facet arthropathy    4. Compression fracture of lumbar spine, non-traumatic, initial encounter ([facility] [address]    5. Age-related osteoporosis with current pathological fracture, initial encounter            Plan:      · OARRS reviewed. · Discussed long term side effects of medications. · Discussed tolerance, dependency and addiction. · Previous UDS reviewed. · Patient told can not receive any pain medications from any other source. · Discussed with pt.may not be pain free. · No evidence of abuse, diversion or aberrant behavior. · Medications and/or procedures improve function and quality of life. · Procedure notes reviewed in detail. · Receiving 70% relief of pain from Kyphoplasty at L3. · L-MRI reviewed   · Not interested in medications, continue tylenol prn   · Discussed Bilateral L-facet MBB for diagnostic and therapeutic relief. Procedure and risks discussed in detail with patient- pain tolerable would like to hold off at this time  · Pain is greatly improved. · Having fatigue, instructed to follow up with pcp. · Patient and wife would like to follow up prn- since patient is doing so well     Meds. Prescribed:   No orders of the defined types were placed in this encounter. Return if symptoms worsen or fail to improve.          Electronically signed by Billy Hernandez CNP on 11/2/2017 at 12:29 PM

## 2018-01-01 ENCOUNTER — APPOINTMENT (OUTPATIENT)
Dept: INTERVENTIONAL RADIOLOGY/VASCULAR | Age: 78
DRG: 853 | End: 2018-01-01
Attending: HOSPITALIST
Payer: MEDICARE

## 2018-01-01 ENCOUNTER — APPOINTMENT (OUTPATIENT)
Dept: MRI IMAGING | Age: 78
End: 2018-01-01
Payer: MEDICARE

## 2018-01-01 ENCOUNTER — APPOINTMENT (OUTPATIENT)
Dept: GENERAL RADIOLOGY | Age: 78
DRG: 853 | End: 2018-01-01
Attending: HOSPITALIST
Payer: MEDICARE

## 2018-01-01 ENCOUNTER — TELEPHONE (OUTPATIENT)
Dept: PHYSICAL MEDICINE AND REHAB | Age: 78
End: 2018-01-01

## 2018-01-01 ENCOUNTER — HOSPITAL ENCOUNTER (INPATIENT)
Age: 78
LOS: 2 days | DRG: 871 | End: 2018-12-22
Attending: FAMILY MEDICINE | Admitting: FAMILY MEDICINE
Payer: MEDICARE

## 2018-01-01 ENCOUNTER — HOSPITAL ENCOUNTER (INPATIENT)
Age: 78
LOS: 19 days | Discharge: HOSPICE | DRG: 853 | End: 2018-12-20
Attending: HOSPITALIST | Admitting: HOSPITALIST
Payer: MEDICARE

## 2018-01-01 ENCOUNTER — HOSPITAL ENCOUNTER (OUTPATIENT)
Age: 78
Setting detail: OUTPATIENT SURGERY
Discharge: HOME | End: 2018-10-01
Attending: PAIN MEDICINE | Admitting: PAIN MEDICINE
Payer: MEDICARE

## 2018-01-01 ENCOUNTER — APPOINTMENT (OUTPATIENT)
Dept: GENERAL RADIOLOGY | Age: 78
End: 2018-01-01
Attending: PAIN MEDICINE
Payer: MEDICARE

## 2018-01-01 ENCOUNTER — OFFICE VISIT (OUTPATIENT)
Dept: PHYSICAL MEDICINE AND REHAB | Age: 78
End: 2018-01-01
Payer: MEDICARE

## 2018-01-01 ENCOUNTER — ANESTHESIA (OUTPATIENT)
Dept: OPERATING ROOM | Age: 78
DRG: 853 | End: 2018-01-01
Payer: MEDICARE

## 2018-01-01 ENCOUNTER — APPOINTMENT (OUTPATIENT)
Dept: CT IMAGING | Age: 78
End: 2018-01-01
Payer: MEDICARE

## 2018-01-01 ENCOUNTER — ANESTHESIA (OUTPATIENT)
Dept: OPERATING ROOM | Age: 78
End: 2018-01-01
Payer: MEDICARE

## 2018-01-01 ENCOUNTER — HOSPITAL ENCOUNTER (OUTPATIENT)
Age: 78
Setting detail: OBSERVATION
Discharge: HOME | End: 2018-05-23
Attending: FAMILY MEDICINE | Admitting: ORTHOPAEDIC SURGERY
Payer: MEDICARE

## 2018-01-01 ENCOUNTER — HOSPITAL ENCOUNTER (OUTPATIENT)
Age: 78
Setting detail: OUTPATIENT SURGERY
Discharge: HOME | End: 2018-11-13
Attending: PAIN MEDICINE | Admitting: PAIN MEDICINE
Payer: MEDICARE

## 2018-01-01 ENCOUNTER — TELEPHONE (OUTPATIENT)
Dept: OPERATING ROOM | Age: 78
End: 2018-01-01

## 2018-01-01 ENCOUNTER — APPOINTMENT (OUTPATIENT)
Dept: CT IMAGING | Age: 78
DRG: 853 | End: 2018-01-01
Attending: HOSPITALIST
Payer: MEDICARE

## 2018-01-01 ENCOUNTER — ANESTHESIA EVENT (OUTPATIENT)
Dept: OPERATING ROOM | Age: 78
End: 2018-01-01
Payer: MEDICARE

## 2018-01-01 ENCOUNTER — HOSPITAL ENCOUNTER (OUTPATIENT)
Age: 78
Setting detail: OUTPATIENT SURGERY
Discharge: HOME | End: 2018-07-23
Attending: PAIN MEDICINE | Admitting: PAIN MEDICINE
Payer: MEDICARE

## 2018-01-01 ENCOUNTER — APPOINTMENT (OUTPATIENT)
Dept: NUCLEAR MEDICINE | Age: 78
DRG: 853 | End: 2018-01-01
Attending: HOSPITALIST
Payer: MEDICARE

## 2018-01-01 ENCOUNTER — ANESTHESIA EVENT (OUTPATIENT)
Dept: OPERATING ROOM | Age: 78
DRG: 853 | End: 2018-01-01
Payer: MEDICARE

## 2018-01-01 VITALS
BODY MASS INDEX: 24 KG/M2 | TEMPERATURE: 97.8 F | HEIGHT: 74 IN | RESPIRATION RATE: 16 BRPM | SYSTOLIC BLOOD PRESSURE: 166 MMHG | HEART RATE: 69 BPM | DIASTOLIC BLOOD PRESSURE: 87 MMHG | WEIGHT: 187 LBS | OXYGEN SATURATION: 98 %

## 2018-01-01 VITALS
BODY MASS INDEX: 23.72 KG/M2 | HEART RATE: 82 BPM | RESPIRATION RATE: 16 BRPM | WEIGHT: 184.8 LBS | OXYGEN SATURATION: 95 % | SYSTOLIC BLOOD PRESSURE: 133 MMHG | TEMPERATURE: 98.5 F | HEIGHT: 74 IN | DIASTOLIC BLOOD PRESSURE: 67 MMHG

## 2018-01-01 VITALS
HEART RATE: 94 BPM | TEMPERATURE: 99.4 F | DIASTOLIC BLOOD PRESSURE: 75 MMHG | OXYGEN SATURATION: 99 % | SYSTOLIC BLOOD PRESSURE: 141 MMHG | RESPIRATION RATE: 22 BRPM | WEIGHT: 178.1 LBS | HEIGHT: 72 IN | BODY MASS INDEX: 24.12 KG/M2

## 2018-01-01 VITALS
WEIGHT: 178 LBS | OXYGEN SATURATION: 98 % | HEART RATE: 104 BPM | RESPIRATION RATE: 12 BRPM | HEIGHT: 72 IN | SYSTOLIC BLOOD PRESSURE: 110 MMHG | TEMPERATURE: 97.7 F | DIASTOLIC BLOOD PRESSURE: 51 MMHG | BODY MASS INDEX: 24.11 KG/M2

## 2018-01-01 VITALS
DIASTOLIC BLOOD PRESSURE: 80 MMHG | HEIGHT: 74 IN | WEIGHT: 171 LBS | SYSTOLIC BLOOD PRESSURE: 136 MMHG | HEART RATE: 88 BPM | BODY MASS INDEX: 21.94 KG/M2

## 2018-01-01 VITALS
RESPIRATION RATE: 12 BRPM | DIASTOLIC BLOOD PRESSURE: 54 MMHG | TEMPERATURE: 98.2 F | SYSTOLIC BLOOD PRESSURE: 89 MMHG | OXYGEN SATURATION: 96 %

## 2018-01-01 VITALS
SYSTOLIC BLOOD PRESSURE: 137 MMHG | OXYGEN SATURATION: 99 % | DIASTOLIC BLOOD PRESSURE: 75 MMHG | RESPIRATION RATE: 20 BRPM

## 2018-01-01 VITALS
DIASTOLIC BLOOD PRESSURE: 87 MMHG | OXYGEN SATURATION: 99 % | RESPIRATION RATE: 9 BRPM | SYSTOLIC BLOOD PRESSURE: 180 MMHG

## 2018-01-01 VITALS
SYSTOLIC BLOOD PRESSURE: 119 MMHG | OXYGEN SATURATION: 97 % | WEIGHT: 187 LBS | HEART RATE: 79 BPM | TEMPERATURE: 97.6 F | RESPIRATION RATE: 16 BRPM | BODY MASS INDEX: 24 KG/M2 | DIASTOLIC BLOOD PRESSURE: 59 MMHG | HEIGHT: 74 IN

## 2018-01-01 VITALS
HEART RATE: 80 BPM | BODY MASS INDEX: 24.54 KG/M2 | TEMPERATURE: 97.7 F | HEIGHT: 74 IN | OXYGEN SATURATION: 96 % | SYSTOLIC BLOOD PRESSURE: 143 MMHG | WEIGHT: 191.19 LBS | DIASTOLIC BLOOD PRESSURE: 81 MMHG | RESPIRATION RATE: 16 BRPM

## 2018-01-01 VITALS
DIASTOLIC BLOOD PRESSURE: 75 MMHG | BODY MASS INDEX: 24 KG/M2 | SYSTOLIC BLOOD PRESSURE: 135 MMHG | HEART RATE: 76 BPM | WEIGHT: 187 LBS | HEIGHT: 74 IN

## 2018-01-01 VITALS
HEIGHT: 74 IN | BODY MASS INDEX: 25.15 KG/M2 | SYSTOLIC BLOOD PRESSURE: 145 MMHG | WEIGHT: 196 LBS | DIASTOLIC BLOOD PRESSURE: 80 MMHG | HEART RATE: 88 BPM

## 2018-01-01 VITALS
RESPIRATION RATE: 13 BRPM | OXYGEN SATURATION: 98 % | DIASTOLIC BLOOD PRESSURE: 70 MMHG | SYSTOLIC BLOOD PRESSURE: 161 MMHG

## 2018-01-01 DIAGNOSIS — M47.816 LUMBAR SPONDYLOSIS: Primary | ICD-10-CM

## 2018-01-01 DIAGNOSIS — M47.816 LUMBAR FACET ARTHROPATHY: ICD-10-CM

## 2018-01-01 DIAGNOSIS — G89.4 CHRONIC PAIN SYNDROME: Primary | ICD-10-CM

## 2018-01-01 DIAGNOSIS — G89.4 CHRONIC PAIN SYNDROME: ICD-10-CM

## 2018-01-01 DIAGNOSIS — M48.061 SPINAL STENOSIS OF LUMBAR REGION WITHOUT NEUROGENIC CLAUDICATION: ICD-10-CM

## 2018-01-01 DIAGNOSIS — M48.56XA COMPRESSION FRACTURE OF LUMBAR SPINE, NON-TRAUMATIC, INITIAL ENCOUNTER (HCC): ICD-10-CM

## 2018-01-01 DIAGNOSIS — Z00.6 EXAMINATION FOR NORMAL COMPARISON FOR CLINICAL RESEARCH: ICD-10-CM

## 2018-01-01 DIAGNOSIS — S32.030A COMPRESSION FRACTURE OF L3 LUMBAR VERTEBRA, CLOSED, INITIAL ENCOUNTER (HCC): ICD-10-CM

## 2018-01-01 DIAGNOSIS — M54.5 ACUTE MIDLINE LOW BACK PAIN, WITH SCIATICA PRESENCE UNSPECIFIED: Primary | ICD-10-CM

## 2018-01-01 DIAGNOSIS — M80.00XA AGE-RELATED OSTEOPOROSIS WITH CURRENT PATHOLOGICAL FRACTURE, INITIAL ENCOUNTER: ICD-10-CM

## 2018-01-01 DIAGNOSIS — M51.36 DEGENERATION OF LUMBAR INTERVERTEBRAL DISC: ICD-10-CM

## 2018-01-01 DIAGNOSIS — M47.816 SPONDYLOSIS OF LUMBAR REGION WITHOUT MYELOPATHY OR RADICULOPATHY: Primary | ICD-10-CM

## 2018-01-01 DIAGNOSIS — S32.030A CLOSED COMPRESSION FRACTURE OF THIRD LUMBAR VERTEBRA, INITIAL ENCOUNTER: ICD-10-CM

## 2018-01-01 LAB
ABO: NORMAL
ABSOLUTE RETIC #: 53 THOU/MM3 (ref 20–115)
ALBUMIN SERPL-MCNC: 1.9 G/DL (ref 3.5–5.1)
ALBUMIN SERPL-MCNC: 2.3 G/DL (ref 3.5–5.1)
ALBUMIN SERPL-MCNC: 2.4 G/DL (ref 3.5–5.1)
ALBUMIN SERPL-MCNC: 2.4 G/DL (ref 3.5–5.1)
ALBUMIN SERPL-MCNC: 2.5 G/DL (ref 3.5–5.1)
ALLEN TEST: ABNORMAL
ALLEN TEST: POSITIVE
ALP BLD-CCNC: 138 U/L (ref 38–126)
ALP BLD-CCNC: 164 U/L (ref 38–126)
ALP BLD-CCNC: 68 U/L (ref 38–126)
ALP BLD-CCNC: 71 U/L (ref 38–126)
ALT SERPL-CCNC: 11 U/L (ref 11–66)
ALT SERPL-CCNC: 25 U/L (ref 11–66)
ALT SERPL-CCNC: 30 U/L (ref 11–66)
ALT SERPL-CCNC: 8 U/L (ref 11–66)
AMYLASE: 71 U/L (ref 20–104)
ANION GAP SERPL CALCULATED.3IONS-SCNC: 10 MEQ/L (ref 8–16)
ANION GAP SERPL CALCULATED.3IONS-SCNC: 11 MEQ/L (ref 8–16)
ANION GAP SERPL CALCULATED.3IONS-SCNC: 12 MEQ/L (ref 8–16)
ANION GAP SERPL CALCULATED.3IONS-SCNC: 13 MEQ/L (ref 8–16)
ANION GAP SERPL CALCULATED.3IONS-SCNC: 13 MEQ/L (ref 8–16)
ANION GAP SERPL CALCULATED.3IONS-SCNC: 14 MEQ/L (ref 8–16)
ANION GAP SERPL CALCULATED.3IONS-SCNC: 15 MEQ/L (ref 8–16)
ANION GAP SERPL CALCULATED.3IONS-SCNC: 15 MEQ/L (ref 8–16)
ANION GAP SERPL CALCULATED.3IONS-SCNC: 7 MEQ/L (ref 8–16)
ANION GAP SERPL CALCULATED.3IONS-SCNC: 9 MEQ/L (ref 8–16)
ANION GAP SERPL CALCULATED.3IONS-SCNC: NORMAL MEQ/L (ref 8–16)
ANISOCYTOSIS: ABNORMAL
ANTIBODY SCREEN: NORMAL
APTT: 31.8 SECONDS (ref 22–38)
APTT: 36.7 SECONDS (ref 22–38)
AST SERPL-CCNC: 17 U/L (ref 5–40)
AST SERPL-CCNC: 27 U/L (ref 5–40)
AST SERPL-CCNC: 32 U/L (ref 5–40)
AST SERPL-CCNC: 33 U/L (ref 5–40)
BACTERIA: ABNORMAL /HPF
BAL CHARACTER: ABNORMAL
BAL COLLECTION SITE: ABNORMAL
BAL COLOR: COLORLESS
BASE EXCESS (CALCULATED): -0.4 MMOL/L (ref -2.5–2.5)
BASE EXCESS (CALCULATED): -0.7 MMOL/L (ref -2.5–2.5)
BASE EXCESS (CALCULATED): -1.7 MMOL/L (ref -2.5–2.5)
BASE EXCESS (CALCULATED): -2.7 MMOL/L (ref -2.5–2.5)
BASE EXCESS (CALCULATED): -6.5 MMOL/L (ref -2.5–2.5)
BASE EXCESS (CALCULATED): -6.7 MMOL/L (ref -2.5–2.5)
BASE EXCESS (CALCULATED): 0.4 MMOL/L (ref -2.5–2.5)
BASE EXCESS (CALCULATED): 0.6 MMOL/L (ref -2.5–2.5)
BASE EXCESS (CALCULATED): 1 MMOL/L (ref -2.5–2.5)
BASE EXCESS (CALCULATED): 1.4 MMOL/L (ref -2.5–2.5)
BASOPHILS # BLD: 0.1 %
BASOPHILS # BLD: 0.2 %
BASOPHILS # BLD: 0.3 %
BASOPHILS # BLD: 0.4 %
BASOPHILS ABSOLUTE: 0 THOU/MM3 (ref 0–0.1)
BASOPHILS ABSOLUTE: 0.1 THOU/MM3 (ref 0–0.1)
BILIRUB SERPL-MCNC: 0.3 MG/DL (ref 0.3–1.2)
BILIRUB SERPL-MCNC: 0.4 MG/DL (ref 0.3–1.2)
BILIRUB SERPL-MCNC: 0.8 MG/DL (ref 0.3–1.2)
BILIRUB SERPL-MCNC: 0.8 MG/DL (ref 0.3–1.2)
BILIRUBIN DIRECT: < 0.2 MG/DL (ref 0–0.3)
BILIRUBIN URINE: NEGATIVE
BLOOD CULTURE, ROUTINE: NORMAL
BLOOD, URINE: NEGATIVE
BUN BLDV-MCNC: 16 MG/DL (ref 7–22)
BUN BLDV-MCNC: 17 MG/DL (ref 7–22)
BUN BLDV-MCNC: 18 MG/DL (ref 7–22)
BUN BLDV-MCNC: 22 MG/DL (ref 7–22)
BUN BLDV-MCNC: 22 MG/DL (ref 7–22)
BUN BLDV-MCNC: 23 MG/DL (ref 7–22)
BUN BLDV-MCNC: 24 MG/DL (ref 7–22)
BUN BLDV-MCNC: 24 MG/DL (ref 7–22)
BUN BLDV-MCNC: 25 MG/DL (ref 7–22)
BUN BLDV-MCNC: 26 MG/DL (ref 7–22)
BUN BLDV-MCNC: 27 MG/DL (ref 7–22)
BUN BLDV-MCNC: 28 MG/DL (ref 7–22)
BUN BLDV-MCNC: 28 MG/DL (ref 7–22)
BUN BLDV-MCNC: 29 MG/DL (ref 7–22)
BUN BLDV-MCNC: 29 MG/DL (ref 7–22)
BUN BLDV-MCNC: 30 MG/DL (ref 7–22)
BUN BLDV-MCNC: 31 MG/DL (ref 7–22)
BUN BLDV-MCNC: 32 MG/DL (ref 7–22)
BUN BLDV-MCNC: 32 MG/DL (ref 7–22)
BUN BLDV-MCNC: 33 MG/DL (ref 7–22)
BUN BLDV-MCNC: 34 MG/DL (ref 7–22)
BUN BLDV-MCNC: 39 MG/DL (ref 7–22)
BUN BLDV-MCNC: 53 MG/DL (ref 7–22)
BUN BLDV-MCNC: 54 MG/DL (ref 7–22)
BUN BLDV-MCNC: 66 MG/DL (ref 7–22)
BUN BLDV-MCNC: NORMAL MG/DL (ref 7–22)
CALCIUM IONIZED SERUM: 1.18 MMOL/L (ref 1.12–1.32)
CALCIUM IONIZED: 1.02 MMOL/L (ref 1.12–1.32)
CALCIUM IONIZED: 1.11 MMOL/L (ref 1.12–1.32)
CALCIUM IONIZED: 1.14 MMOL/L (ref 1.12–1.32)
CALCIUM IONIZED: 1.16 MMOL/L (ref 1.12–1.32)
CALCIUM IONIZED: 1.18 MMOL/L (ref 1.12–1.32)
CALCIUM IONIZED: 1.23 MMOL/L (ref 1.12–1.32)
CALCIUM IONIZED: 1.24 MMOL/L (ref 1.12–1.32)
CALCIUM IONIZED: 1.24 MMOL/L (ref 1.12–1.32)
CALCIUM IONIZED: 1.25 MMOL/L (ref 1.12–1.32)
CALCIUM IONIZED: 1.26 MMOL/L (ref 1.12–1.32)
CALCIUM IONIZED: 1.27 MMOL/L (ref 1.12–1.32)
CALCIUM IONIZED: 1.28 MMOL/L (ref 1.12–1.32)
CALCIUM IONIZED: 1.3 MMOL/L (ref 1.12–1.32)
CALCIUM IONIZED: 1.3 MMOL/L (ref 1.12–1.32)
CALCIUM IONIZED: 1.31 MMOL/L (ref 1.12–1.32)
CALCIUM IONIZED: 1.31 MMOL/L (ref 1.12–1.32)
CALCIUM IONIZED: 1.43 MMOL/L (ref 1.12–1.32)
CALCIUM SERPL-MCNC: 7.4 MG/DL (ref 8.5–10.5)
CALCIUM SERPL-MCNC: 7.6 MG/DL (ref 8.5–10.5)
CALCIUM SERPL-MCNC: 7.9 MG/DL (ref 8.5–10.5)
CALCIUM SERPL-MCNC: 8.1 MG/DL (ref 8.5–10.5)
CALCIUM SERPL-MCNC: 8.2 MG/DL (ref 8.5–10.5)
CALCIUM SERPL-MCNC: 8.3 MG/DL (ref 8.5–10.5)
CALCIUM SERPL-MCNC: 8.5 MG/DL (ref 8.5–10.5)
CALCIUM SERPL-MCNC: 8.6 MG/DL (ref 8.5–10.5)
CALCIUM SERPL-MCNC: 8.7 MG/DL (ref 8.5–10.5)
CALCIUM SERPL-MCNC: 8.8 MG/DL (ref 8.5–10.5)
CALCIUM SERPL-MCNC: 8.9 MG/DL (ref 8.5–10.5)
CALCIUM SERPL-MCNC: 8.9 MG/DL (ref 8.5–10.5)
CALCIUM SERPL-MCNC: 9 MG/DL (ref 8.5–10.5)
CALCIUM SERPL-MCNC: 9.2 MG/DL (ref 8.5–10.5)
CALCIUM SERPL-MCNC: 9.2 MG/DL (ref 8.5–10.5)
CALCIUM SERPL-MCNC: 9.3 MG/DL (ref 8.5–10.5)
CALCIUM SERPL-MCNC: 9.4 MG/DL (ref 8.5–10.5)
CALCIUM SERPL-MCNC: 9.4 MG/DL (ref 8.5–10.5)
CALCIUM SERPL-MCNC: NORMAL MG/DL (ref 8.5–10.5)
CASTS 2: ABNORMAL /LPF
CASTS UA: ABNORMAL /LPF
CHARACTER, URINE: CLEAR
CHLORIDE BLD-SCNC: 103 MEQ/L (ref 98–111)
CHLORIDE BLD-SCNC: 103 MEQ/L (ref 98–111)
CHLORIDE BLD-SCNC: 104 MEQ/L (ref 98–111)
CHLORIDE BLD-SCNC: 105 MEQ/L (ref 98–111)
CHLORIDE BLD-SCNC: 106 MEQ/L (ref 98–111)
CHLORIDE BLD-SCNC: 107 MEQ/L (ref 98–111)
CHLORIDE BLD-SCNC: 108 MEQ/L (ref 98–111)
CHLORIDE BLD-SCNC: 108 MEQ/L (ref 98–111)
CHLORIDE BLD-SCNC: 109 MEQ/L (ref 98–111)
CHLORIDE BLD-SCNC: 110 MEQ/L (ref 98–111)
CHLORIDE BLD-SCNC: 111 MEQ/L (ref 98–111)
CHLORIDE BLD-SCNC: 112 MEQ/L (ref 98–111)
CHLORIDE BLD-SCNC: 112 MEQ/L (ref 98–111)
CHLORIDE BLD-SCNC: 114 MEQ/L (ref 98–111)
CHLORIDE BLD-SCNC: 114 MEQ/L (ref 98–111)
CHLORIDE BLD-SCNC: 115 MEQ/L (ref 98–111)
CHLORIDE BLD-SCNC: 115 MEQ/L (ref 98–111)
CHLORIDE BLD-SCNC: 117 MEQ/L (ref 98–111)
CHLORIDE BLD-SCNC: 120 MEQ/L (ref 98–111)
CHLORIDE BLD-SCNC: NORMAL MEQ/L (ref 98–111)
CMV BY PCR, QUALITATIVE BLOOD: DETECTED
CO2: 16 MEQ/L (ref 23–33)
CO2: 20 MEQ/L (ref 23–33)
CO2: 20 MEQ/L (ref 23–33)
CO2: 21 MEQ/L (ref 23–33)
CO2: 21 MEQ/L (ref 23–33)
CO2: 22 MEQ/L (ref 23–33)
CO2: 23 MEQ/L (ref 23–33)
CO2: 24 MEQ/L (ref 23–33)
CO2: 25 MEQ/L (ref 23–33)
CO2: 26 MEQ/L (ref 23–33)
CO2: 26 MEQ/L (ref 23–33)
CO2: 27 MEQ/L (ref 23–33)
CO2: NORMAL MEQ/L (ref 23–33)
COLLECTED BY:: ABNORMAL
COLOR: YELLOW
CREAT SERPL-MCNC: 0.7 MG/DL (ref 0.4–1.2)
CREAT SERPL-MCNC: 0.8 MG/DL (ref 0.4–1.2)
CREAT SERPL-MCNC: 0.8 MG/DL (ref 0.4–1.2)
CREAT SERPL-MCNC: 0.9 MG/DL (ref 0.4–1.2)
CREAT SERPL-MCNC: 1 MG/DL (ref 0.4–1.2)
CREAT SERPL-MCNC: 1.1 MG/DL (ref 0.4–1.2)
CREAT SERPL-MCNC: 1.2 MG/DL (ref 0.4–1.2)
CREAT SERPL-MCNC: 1.2 MG/DL (ref 0.4–1.2)
CREAT SERPL-MCNC: 1.4 MG/DL (ref 0.4–1.2)
CREAT SERPL-MCNC: 1.5 MG/DL (ref 0.4–1.2)
CREAT SERPL-MCNC: 1.6 MG/DL (ref 0.4–1.2)
CREAT SERPL-MCNC: NORMAL MG/DL (ref 0.4–1.2)
CRYSTALS, UA: ABNORMAL
CYTOLOGY-NON GYN: NORMAL
CYTOMEGALOVIRUS (CMV) CULTURE: NORMAL
DEVICE: ABNORMAL
EKG ATRIAL RATE: 117 BPM
EKG ATRIAL RATE: 124 BPM
EKG ATRIAL RATE: 90 BPM
EKG ATRIAL RATE: 92 BPM
EKG P AXIS: 41 DEGREES
EKG P AXIS: 51 DEGREES
EKG P AXIS: 59 DEGREES
EKG P-R INTERVAL: 132 MS
EKG P-R INTERVAL: 134 MS
EKG P-R INTERVAL: 162 MS
EKG Q-T INTERVAL: 288 MS
EKG Q-T INTERVAL: 314 MS
EKG Q-T INTERVAL: 316 MS
EKG Q-T INTERVAL: 370 MS
EKG QRS DURATION: 74 MS
EKG QRS DURATION: 78 MS
EKG QRS DURATION: 84 MS
EKG QRS DURATION: 96 MS
EKG QTC CALCULATION (BAZETT): 438 MS
EKG QTC CALCULATION (BAZETT): 452 MS
EKG QTC CALCULATION (BAZETT): 453 MS
EKG QTC CALCULATION (BAZETT): 468 MS
EKG R AXIS: -12 DEGREES
EKG R AXIS: -28 DEGREES
EKG R AXIS: -38 DEGREES
EKG R AXIS: -41 DEGREES
EKG T AXIS: 29 DEGREES
EKG T AXIS: 33 DEGREES
EKG T AXIS: 63 DEGREES
EKG T AXIS: 66 DEGREES
EKG VENTRICULAR RATE: 117 BPM
EKG VENTRICULAR RATE: 124 BPM
EKG VENTRICULAR RATE: 159 BPM
EKG VENTRICULAR RATE: 90 BPM
EOSINOPHIL # BLD: 0.4 %
EOSINOPHIL # BLD: 0.8 %
EOSINOPHIL # BLD: 1.8 %
EOSINOPHIL # BLD: 2.6 %
EOSINOPHILS ABSOLUTE: 0 THOU/MM3 (ref 0–0.4)
EOSINOPHILS ABSOLUTE: 0.1 THOU/MM3 (ref 0–0.4)
EOSINOPHILS ABSOLUTE: 0.2 THOU/MM3 (ref 0–0.4)
EOSINOPHILS ABSOLUTE: 0.3 THOU/MM3 (ref 0–0.4)
EPITHELIAL CELLS, UA: ABNORMAL /HPF
ERYTHROCYTE [DISTWIDTH] IN BLOOD BY AUTOMATED COUNT: 14.5 % (ref 11.5–14.5)
ERYTHROCYTE [DISTWIDTH] IN BLOOD BY AUTOMATED COUNT: 14.7 % (ref 11.5–14.5)
ERYTHROCYTE [DISTWIDTH] IN BLOOD BY AUTOMATED COUNT: 14.8 % (ref 11.5–14.5)
ERYTHROCYTE [DISTWIDTH] IN BLOOD BY AUTOMATED COUNT: 15 % (ref 11.5–14.5)
ERYTHROCYTE [DISTWIDTH] IN BLOOD BY AUTOMATED COUNT: 15.1 % (ref 11.5–14.5)
ERYTHROCYTE [DISTWIDTH] IN BLOOD BY AUTOMATED COUNT: 15.3 % (ref 11.5–14.5)
ERYTHROCYTE [DISTWIDTH] IN BLOOD BY AUTOMATED COUNT: 15.3 % (ref 11.5–14.5)
ERYTHROCYTE [DISTWIDTH] IN BLOOD BY AUTOMATED COUNT: 15.6 % (ref 11.5–14.5)
ERYTHROCYTE [DISTWIDTH] IN BLOOD BY AUTOMATED COUNT: 15.7 % (ref 11.5–14.5)
ERYTHROCYTE [DISTWIDTH] IN BLOOD BY AUTOMATED COUNT: 15.7 % (ref 11.5–14.5)
ERYTHROCYTE [DISTWIDTH] IN BLOOD BY AUTOMATED COUNT: 15.9 % (ref 11.5–14.5)
ERYTHROCYTE [DISTWIDTH] IN BLOOD BY AUTOMATED COUNT: 16.2 % (ref 11.5–14.5)
ERYTHROCYTE [DISTWIDTH] IN BLOOD BY AUTOMATED COUNT: 16.2 % (ref 11.5–14.5)
ERYTHROCYTE [DISTWIDTH] IN BLOOD BY AUTOMATED COUNT: 16.4 % (ref 11.5–14.5)
ERYTHROCYTE [DISTWIDTH] IN BLOOD BY AUTOMATED COUNT: 16.5 % (ref 11.5–14.5)
ERYTHROCYTE [DISTWIDTH] IN BLOOD BY AUTOMATED COUNT: 16.6 % (ref 11.5–14.5)
ERYTHROCYTE [DISTWIDTH] IN BLOOD BY AUTOMATED COUNT: 16.7 % (ref 11.5–14.5)
ERYTHROCYTE [DISTWIDTH] IN BLOOD BY AUTOMATED COUNT: 16.7 % (ref 11.5–14.5)
ERYTHROCYTE [DISTWIDTH] IN BLOOD BY AUTOMATED COUNT: 16.8 % (ref 11.5–14.5)
ERYTHROCYTE [DISTWIDTH] IN BLOOD BY AUTOMATED COUNT: 17.1 % (ref 11.5–14.5)
ERYTHROCYTE [DISTWIDTH] IN BLOOD BY AUTOMATED COUNT: 17.2 % (ref 11.5–14.5)
ERYTHROCYTE [DISTWIDTH] IN BLOOD BY AUTOMATED COUNT: 47.3 FL (ref 35–45)
ERYTHROCYTE [DISTWIDTH] IN BLOOD BY AUTOMATED COUNT: 47.9 FL (ref 35–45)
ERYTHROCYTE [DISTWIDTH] IN BLOOD BY AUTOMATED COUNT: 48 FL (ref 35–45)
ERYTHROCYTE [DISTWIDTH] IN BLOOD BY AUTOMATED COUNT: 48.6 FL (ref 35–45)
ERYTHROCYTE [DISTWIDTH] IN BLOOD BY AUTOMATED COUNT: 48.9 FL (ref 35–45)
ERYTHROCYTE [DISTWIDTH] IN BLOOD BY AUTOMATED COUNT: 49.6 FL (ref 35–45)
ERYTHROCYTE [DISTWIDTH] IN BLOOD BY AUTOMATED COUNT: 50.1 FL (ref 35–45)
ERYTHROCYTE [DISTWIDTH] IN BLOOD BY AUTOMATED COUNT: 50.3 FL (ref 35–45)
ERYTHROCYTE [DISTWIDTH] IN BLOOD BY AUTOMATED COUNT: 50.6 FL (ref 35–45)
ERYTHROCYTE [DISTWIDTH] IN BLOOD BY AUTOMATED COUNT: 51 FL (ref 35–45)
ERYTHROCYTE [DISTWIDTH] IN BLOOD BY AUTOMATED COUNT: 52.1 FL (ref 35–45)
ERYTHROCYTE [DISTWIDTH] IN BLOOD BY AUTOMATED COUNT: 52.3 FL (ref 35–45)
ERYTHROCYTE [DISTWIDTH] IN BLOOD BY AUTOMATED COUNT: 52.7 FL (ref 35–45)
ERYTHROCYTE [DISTWIDTH] IN BLOOD BY AUTOMATED COUNT: 54.9 FL (ref 35–45)
ERYTHROCYTE [DISTWIDTH] IN BLOOD BY AUTOMATED COUNT: 55 FL (ref 35–45)
ERYTHROCYTE [DISTWIDTH] IN BLOOD BY AUTOMATED COUNT: 55.5 FL (ref 35–45)
ERYTHROCYTE [DISTWIDTH] IN BLOOD BY AUTOMATED COUNT: 55.6 FL (ref 35–45)
ERYTHROCYTE [DISTWIDTH] IN BLOOD BY AUTOMATED COUNT: 55.7 FL (ref 35–45)
ERYTHROCYTE [DISTWIDTH] IN BLOOD BY AUTOMATED COUNT: 56.2 FL (ref 35–45)
ERYTHROCYTE [DISTWIDTH] IN BLOOD BY AUTOMATED COUNT: 56.4 FL (ref 35–45)
ERYTHROCYTE [DISTWIDTH] IN BLOOD BY AUTOMATED COUNT: 56.7 FL (ref 35–45)
ERYTHROCYTE [DISTWIDTH] IN BLOOD BY AUTOMATED COUNT: 56.7 FL (ref 35–45)
ERYTHROCYTE [DISTWIDTH] IN BLOOD BY AUTOMATED COUNT: 58.4 FL (ref 35–45)
FERRITIN: 217 NG/ML (ref 22–322)
FILM ARRAY ADENOVIRUS: NOT DETECTED
FILM ARRAY BORDETELLA PERTUSSIS: NOT DETECTED
FILM ARRAY CHLAMYDOPHILIA PNEUMONIAE: NOT DETECTED
FILM ARRAY CORONAVIRUS 229E: NOT DETECTED
FILM ARRAY CORONAVIRUS HKU1: NOT DETECTED
FILM ARRAY CORONAVIRUS NL63: NOT DETECTED
FILM ARRAY CORONAVIRUS OC43: NOT DETECTED
FILM ARRAY INFLUENZA A VIRUS 09H1: NORMAL
FILM ARRAY INFLUENZA A VIRUS H1: NORMAL
FILM ARRAY INFLUENZA A VIRUS H3: NORMAL
FILM ARRAY INFLUENZA A VIRUS: NOT DETECTED
FILM ARRAY INFLUENZA B: NOT DETECTED
FILM ARRAY METAPNEUMOVIRUS: NOT DETECTED
FILM ARRAY MYCOPLASMA PNEUMONIAE: NOT DETECTED
FILM ARRAY PARAINFLUENZA VIRUS 1: NOT DETECTED
FILM ARRAY PARAINFLUENZA VIRUS 2: NOT DETECTED
FILM ARRAY PARAINFLUENZA VIRUS 3: NOT DETECTED
FILM ARRAY PARAINFLUENZA VIRUS 4: NOT DETECTED
FILM ARRAY RESPIRATORY SYNCITIAL VIRUS: NOT DETECTED
FILM ARRAY RHINOVIRUS/ENTEROVIRUS: NOT DETECTED
FUNGUS SMEAR: ABNORMAL
GEL INDIRECT COOMBS: NORMAL
GFR SERPL CREATININE-BSD FRML MDRD: 42 ML/MIN/1.73M2
GFR SERPL CREATININE-BSD FRML MDRD: 45 ML/MIN/1.73M2
GFR SERPL CREATININE-BSD FRML MDRD: 49 ML/MIN/1.73M2
GFR SERPL CREATININE-BSD FRML MDRD: 59 ML/MIN/1.73M2
GFR SERPL CREATININE-BSD FRML MDRD: 59 ML/MIN/1.73M2
GFR SERPL CREATININE-BSD FRML MDRD: 65 ML/MIN/1.73M2
GFR SERPL CREATININE-BSD FRML MDRD: 72 ML/MIN/1.73M2
GFR SERPL CREATININE-BSD FRML MDRD: 82 ML/MIN/1.73M2
GFR SERPL CREATININE-BSD FRML MDRD: > 90 ML/MIN/1.73M2
GFR SERPL CREATININE-BSD FRML MDRD: NORMAL ML/MIN/1.73M2
GLUCOSE BLD-MCNC: 101 MG/DL (ref 70–108)
GLUCOSE BLD-MCNC: 102 MG/DL (ref 70–108)
GLUCOSE BLD-MCNC: 107 MG/DL (ref 70–108)
GLUCOSE BLD-MCNC: 107 MG/DL (ref 70–108)
GLUCOSE BLD-MCNC: 110 MG/DL (ref 70–108)
GLUCOSE BLD-MCNC: 111 MG/DL (ref 70–108)
GLUCOSE BLD-MCNC: 112 MG/DL (ref 70–108)
GLUCOSE BLD-MCNC: 114 MG/DL (ref 70–108)
GLUCOSE BLD-MCNC: 117 MG/DL (ref 70–108)
GLUCOSE BLD-MCNC: 118 MG/DL (ref 70–108)
GLUCOSE BLD-MCNC: 118 MG/DL (ref 70–108)
GLUCOSE BLD-MCNC: 119 MG/DL (ref 70–108)
GLUCOSE BLD-MCNC: 120 MG/DL (ref 70–108)
GLUCOSE BLD-MCNC: 121 MG/DL (ref 70–108)
GLUCOSE BLD-MCNC: 122 MG/DL (ref 70–108)
GLUCOSE BLD-MCNC: 123 MG/DL (ref 70–108)
GLUCOSE BLD-MCNC: 124 MG/DL (ref 70–108)
GLUCOSE BLD-MCNC: 125 MG/DL (ref 70–108)
GLUCOSE BLD-MCNC: 125 MG/DL (ref 70–108)
GLUCOSE BLD-MCNC: 126 MG/DL (ref 70–108)
GLUCOSE BLD-MCNC: 127 MG/DL (ref 70–108)
GLUCOSE BLD-MCNC: 130 MG/DL (ref 70–108)
GLUCOSE BLD-MCNC: 130 MG/DL (ref 70–108)
GLUCOSE BLD-MCNC: 131 MG/DL (ref 70–108)
GLUCOSE BLD-MCNC: 133 MG/DL (ref 70–108)
GLUCOSE BLD-MCNC: 134 MG/DL (ref 70–108)
GLUCOSE BLD-MCNC: 134 MG/DL (ref 70–108)
GLUCOSE BLD-MCNC: 136 MG/DL (ref 70–108)
GLUCOSE BLD-MCNC: 136 MG/DL (ref 70–108)
GLUCOSE BLD-MCNC: 138 MG/DL (ref 70–108)
GLUCOSE BLD-MCNC: 139 MG/DL (ref 70–108)
GLUCOSE BLD-MCNC: 140 MG/DL (ref 70–108)
GLUCOSE BLD-MCNC: 140 MG/DL (ref 70–108)
GLUCOSE BLD-MCNC: 141 MG/DL (ref 70–108)
GLUCOSE BLD-MCNC: 142 MG/DL (ref 70–108)
GLUCOSE BLD-MCNC: 144 MG/DL (ref 70–108)
GLUCOSE BLD-MCNC: 147 MG/DL (ref 70–108)
GLUCOSE BLD-MCNC: 147 MG/DL (ref 70–108)
GLUCOSE BLD-MCNC: 149 MG/DL (ref 70–108)
GLUCOSE BLD-MCNC: 150 MG/DL (ref 70–108)
GLUCOSE BLD-MCNC: 156 MG/DL (ref 70–108)
GLUCOSE BLD-MCNC: 156 MG/DL (ref 70–108)
GLUCOSE BLD-MCNC: 159 MG/DL (ref 70–108)
GLUCOSE BLD-MCNC: 161 MG/DL (ref 70–108)
GLUCOSE BLD-MCNC: 166 MG/DL (ref 70–108)
GLUCOSE BLD-MCNC: 169 MG/DL (ref 70–108)
GLUCOSE BLD-MCNC: 173 MG/DL (ref 70–108)
GLUCOSE BLD-MCNC: 175 MG/DL (ref 70–108)
GLUCOSE BLD-MCNC: 183 MG/DL (ref 70–108)
GLUCOSE BLD-MCNC: 197 MG/DL (ref 70–108)
GLUCOSE BLD-MCNC: 537 MG/DL (ref 70–108)
GLUCOSE BLD-MCNC: 82 MG/DL (ref 70–108)
GLUCOSE BLD-MCNC: 87 MG/DL (ref 70–108)
GLUCOSE BLD-MCNC: 90 MG/DL (ref 70–108)
GLUCOSE BLD-MCNC: 91 MG/DL (ref 70–108)
GLUCOSE BLD-MCNC: 95 MG/DL (ref 70–108)
GLUCOSE BLD-MCNC: 98 MG/DL (ref 70–108)
GLUCOSE BLD-MCNC: NORMAL MG/DL (ref 70–108)
GLUCOSE URINE: NEGATIVE MG/DL
GLUCOSE, WHOLE BLOOD: 100 MG/DL (ref 70–108)
GLUCOSE, WHOLE BLOOD: 146 MG/DL (ref 70–108)
GLUCOSE, WHOLE BLOOD: 154 MG/DL (ref 70–108)
GLUCOSE, WHOLE BLOOD: 172 MG/DL (ref 70–108)
GLUCOSE, WHOLE BLOOD: 174 MG/DL (ref 70–108)
GLUCOSE, WHOLE BLOOD: 94 MG/DL (ref 70–108)
GRAM STAIN RESULT: ABNORMAL
GRAM STAIN RESULT: ABNORMAL
GRAM STAIN RESULT: NORMAL
HCO3: 18 MMOL/L (ref 23–28)
HCO3: 19 MMOL/L (ref 23–28)
HCO3: 21 MMOL/L (ref 23–28)
HCO3: 22 MMOL/L (ref 23–28)
HCO3: 23 MMOL/L (ref 23–28)
HCO3: 23 MMOL/L (ref 23–28)
HCO3: 24 MMOL/L (ref 23–28)
HCO3: 25 MMOL/L (ref 23–28)
HCT VFR BLD CALC: 22.1 % (ref 42–52)
HCT VFR BLD CALC: 22.7 % (ref 42–52)
HCT VFR BLD CALC: 23.5 % (ref 42–52)
HCT VFR BLD CALC: 23.8 % (ref 42–52)
HCT VFR BLD CALC: 23.8 % (ref 42–52)
HCT VFR BLD CALC: 24.5 % (ref 42–52)
HCT VFR BLD CALC: 24.8 % (ref 42–52)
HCT VFR BLD CALC: 25 % (ref 42–52)
HCT VFR BLD CALC: 25.1 % (ref 42–52)
HCT VFR BLD CALC: 25.4 % (ref 42–52)
HCT VFR BLD CALC: 25.9 % (ref 42–52)
HCT VFR BLD CALC: 26.3 % (ref 42–52)
HCT VFR BLD CALC: 26.4 % (ref 42–52)
HCT VFR BLD CALC: 26.4 % (ref 42–52)
HCT VFR BLD CALC: 26.8 % (ref 42–52)
HCT VFR BLD CALC: 26.8 % (ref 42–52)
HCT VFR BLD CALC: 27 % (ref 42–52)
HCT VFR BLD CALC: 27.1 % (ref 42–52)
HCT VFR BLD CALC: 28 % (ref 42–52)
HCT VFR BLD CALC: 28.5 % (ref 42–52)
HCT VFR BLD CALC: 28.9 % (ref 42–52)
HCT VFR BLD CALC: 29.6 % (ref 42–52)
HCT VFR BLD CALC: 30.1 % (ref 42–52)
HCT VFR BLD CALC: 31.5 % (ref 42–52)
HCT VFR BLD CALC: 32.9 % (ref 42–52)
HCT VFR BLD CALC: 32.9 % (ref 42–52)
HCT VFR BLD CALC: 33.3 % (ref 42–52)
HCT VFR BLD CALC: 35.7 % (ref 42–52)
HCT VFR BLD CALC: 39 % (ref 42–52)
HEMOGLOBIN: 10 GM/DL (ref 14–18)
HEMOGLOBIN: 10 GM/DL (ref 14–18)
HEMOGLOBIN: 10.2 GM/DL (ref 14–18)
HEMOGLOBIN: 10.7 GM/DL (ref 14–18)
HEMOGLOBIN: 13.4 GM/DL (ref 14–18)
HEMOGLOBIN: 6.7 GM/DL (ref 14–18)
HEMOGLOBIN: 6.8 GM/DL (ref 14–18)
HEMOGLOBIN: 7.1 GM/DL (ref 14–18)
HEMOGLOBIN: 7.3 GM/DL (ref 14–18)
HEMOGLOBIN: 7.3 GM/DL (ref 14–18)
HEMOGLOBIN: 7.5 GM/DL (ref 14–18)
HEMOGLOBIN: 7.5 GM/DL (ref 14–18)
HEMOGLOBIN: 7.6 GM/DL (ref 14–18)
HEMOGLOBIN: 7.6 GM/DL (ref 14–18)
HEMOGLOBIN: 7.7 GM/DL (ref 14–18)
HEMOGLOBIN: 7.8 GM/DL (ref 14–18)
HEMOGLOBIN: 8.1 GM/DL (ref 14–18)
HEMOGLOBIN: 8.2 GM/DL (ref 14–18)
HEMOGLOBIN: 8.3 GM/DL (ref 14–18)
HEMOGLOBIN: 8.5 GM/DL (ref 14–18)
HEMOGLOBIN: 8.7 GM/DL (ref 14–18)
HEMOGLOBIN: 8.8 GM/DL (ref 14–18)
HEMOGLOBIN: 9 GM/DL (ref 14–18)
HEMOGLOBIN: 9.5 GM/DL (ref 14–18)
HERPES SIMPLEX CULTURE: NORMAL
HERPES SIMPLEX VIRUS BY PCR: NOT DETECTED
HSV SOURCE: NORMAL
HSV TYPE 1: NORMAL
HSV TYPE 2: NORMAL
IFIO2: 100
IFIO2: 100
IFIO2: 2
IFIO2: 30
IFIO2: 30
IFIO2: 40
IFIO2: 45
IFIO2: 80
IGA: 149 MG/DL (ref 70–400)
IGG: 469 MG/DL (ref 700–1600)
IGM: 36 MG/DL (ref 40–230)
IMMATURE GRANS (ABS): 0.11 THOU/MM3 (ref 0–0.07)
IMMATURE GRANS (ABS): 0.2 THOU/MM3 (ref 0–0.07)
IMMATURE GRANS (ABS): 0.23 THOU/MM3 (ref 0–0.07)
IMMATURE GRANULOCYTES: 0.9 %
IMMATURE GRANULOCYTES: 1.8 %
IMMATURE GRANULOCYTES: 1.8 %
IMMATURE RETIC FRACT: 10 % (ref 2.3–13.4)
INR BLD: 1.24 (ref 0.85–1.13)
INR BLD: 1.28 (ref 0.85–1.13)
INR BLD: 1.34 (ref 0.85–1.13)
IRON SATURATION: 9 % (ref 20–50)
IRON: 17 UG/DL (ref 65–195)
IRON: 17 UG/DL (ref 65–195)
KETONES, URINE: NEGATIVE
LACTIC ACID: 0.8 MMOL/L (ref 0.5–2.2)
LACTIC ACID: 1.1 MMOL/L (ref 0.5–2.2)
LACTIC ACID: 1.2 MMOL/L (ref 0.5–2.2)
LACTIC ACID: 1.3 MMOL/L (ref 0.5–2.2)
LACTIC ACID: 2 MMOL/L (ref 0.5–2.2)
LEGIONELLA SPECIES CULTURE: NORMAL
LEGIONELLA URINARY AG: NEGATIVE
LEUKOCYTE ESTERASE, URINE: NEGATIVE
LIPASE: 63.5 U/L (ref 5.6–51.3)
LYMPHOCYTES # BLD: 10.5 %
LYMPHOCYTES # BLD: 12.6 %
LYMPHOCYTES # BLD: 21.3 %
LYMPHOCYTES # BLD: 4.9 %
LYMPHOCYTES ABSOLUTE: 0.6 THOU/MM3 (ref 1–4.8)
LYMPHOCYTES ABSOLUTE: 1.2 THOU/MM3 (ref 1–4.8)
LYMPHOCYTES ABSOLUTE: 1.7 THOU/MM3 (ref 1–4.8)
LYMPHOCYTES ABSOLUTE: 1.8 THOU/MM3 (ref 1–4.8)
LYMPHOCYTES, BAL: 1 % (ref 10–15)
MACROPHAGE/MONOCYTE BAL: 3 % (ref 86–100)
MAGNESIUM: 1.5 MG/DL (ref 1.6–2.4)
MAGNESIUM: 1.6 MG/DL (ref 1.6–2.4)
MAGNESIUM: 1.9 MG/DL (ref 1.6–2.4)
MAGNESIUM: 2 MG/DL (ref 1.6–2.4)
MAGNESIUM: 2.1 MG/DL (ref 1.6–2.4)
MAGNESIUM: 2.2 MG/DL (ref 1.6–2.4)
MAGNESIUM: NORMAL MG/DL (ref 1.6–2.4)
MCH RBC QN AUTO: 27.1 PG (ref 26–33)
MCH RBC QN AUTO: 27.2 PG (ref 26–33)
MCH RBC QN AUTO: 27.3 PG (ref 26–33)
MCH RBC QN AUTO: 27.3 PG (ref 26–33)
MCH RBC QN AUTO: 27.4 PG (ref 26–33)
MCH RBC QN AUTO: 27.5 PG (ref 26–33)
MCH RBC QN AUTO: 27.6 PG (ref 26–33)
MCH RBC QN AUTO: 27.8 PG (ref 26–33)
MCH RBC QN AUTO: 27.9 PG (ref 26–33)
MCH RBC QN AUTO: 27.9 PG (ref 26–33)
MCH RBC QN AUTO: 28 PG (ref 26–33)
MCH RBC QN AUTO: 28.1 PG (ref 26–33)
MCH RBC QN AUTO: 28.4 PG (ref 26–33)
MCH RBC QN AUTO: 30.9 PG (ref 27–31)
MCHC RBC AUTO-ENTMCNC: 29.1 GM/DL (ref 32.2–35.5)
MCHC RBC AUTO-ENTMCNC: 29.2 GM/DL (ref 32.2–35.5)
MCHC RBC AUTO-ENTMCNC: 29.2 GM/DL (ref 32.2–35.5)
MCHC RBC AUTO-ENTMCNC: 29.3 GM/DL (ref 32.2–35.5)
MCHC RBC AUTO-ENTMCNC: 29.4 GM/DL (ref 32.2–35.5)
MCHC RBC AUTO-ENTMCNC: 29.5 GM/DL (ref 32.2–35.5)
MCHC RBC AUTO-ENTMCNC: 30 GM/DL (ref 32.2–35.5)
MCHC RBC AUTO-ENTMCNC: 30 GM/DL (ref 32.2–35.5)
MCHC RBC AUTO-ENTMCNC: 30.2 GM/DL (ref 32.2–35.5)
MCHC RBC AUTO-ENTMCNC: 30.2 GM/DL (ref 32.2–35.5)
MCHC RBC AUTO-ENTMCNC: 30.3 GM/DL (ref 32.2–35.5)
MCHC RBC AUTO-ENTMCNC: 30.4 GM/DL (ref 32.2–35.5)
MCHC RBC AUTO-ENTMCNC: 30.5 GM/DL (ref 32.2–35.5)
MCHC RBC AUTO-ENTMCNC: 30.6 GM/DL (ref 32.2–35.5)
MCHC RBC AUTO-ENTMCNC: 30.7 GM/DL (ref 32.2–35.5)
MCHC RBC AUTO-ENTMCNC: 31 GM/DL (ref 32.2–35.5)
MCHC RBC AUTO-ENTMCNC: 31 GM/DL (ref 32.2–35.5)
MCHC RBC AUTO-ENTMCNC: 34.3 GM/DL (ref 33–37)
MCV RBC AUTO: 88.8 FL (ref 80–94)
MCV RBC AUTO: 89 FL (ref 80–94)
MCV RBC AUTO: 89.7 FL (ref 80–94)
MCV RBC AUTO: 89.9 FL (ref 80–94)
MCV RBC AUTO: 89.9 FL (ref 80–94)
MCV RBC AUTO: 90.1 FL (ref 80–94)
MCV RBC AUTO: 90.1 FL (ref 80–94)
MCV RBC AUTO: 90.2 FL (ref 80–94)
MCV RBC AUTO: 90.2 FL (ref 80–94)
MCV RBC AUTO: 90.4 FL (ref 80–94)
MCV RBC AUTO: 90.4 FL (ref 80–94)
MCV RBC AUTO: 90.9 FL (ref 80–94)
MCV RBC AUTO: 91 FL (ref 80–94)
MCV RBC AUTO: 91.2 FL (ref 80–94)
MCV RBC AUTO: 91.5 FL (ref 80–94)
MCV RBC AUTO: 91.8 FL (ref 80–94)
MCV RBC AUTO: 91.8 FL (ref 80–94)
MCV RBC AUTO: 92 FL (ref 80–94)
MCV RBC AUTO: 92.3 FL (ref 80–94)
MCV RBC AUTO: 92.5 FL (ref 80–94)
MCV RBC AUTO: 93.2 FL (ref 80–94)
MCV RBC AUTO: 93.3 FL (ref 80–94)
MCV RBC AUTO: 93.7 FL (ref 80–94)
MCV RBC AUTO: 94.4 FL (ref 80–94)
MISCELLANEOUS 2: ABNORMAL
MODE: ABNORMAL
MONOCYTES # BLD: 3.7 %
MONOCYTES # BLD: 3.8 %
MONOCYTES # BLD: 3.8 %
MONOCYTES # BLD: 6.7 %
MONOCYTES ABSOLUTE: 0.4 THOU/MM3 (ref 0.4–1.3)
MONOCYTES ABSOLUTE: 0.5 THOU/MM3 (ref 0.4–1.3)
MONOCYTES ABSOLUTE: 0.5 THOU/MM3 (ref 0.4–1.3)
MONOCYTES ABSOLUTE: 0.6 THOU/MM3 (ref 0.4–1.3)
MRSA SCREEN RT-PCR: NEGATIVE
MRSA SCREEN: NORMAL
NITRITE, URINE: NEGATIVE
NUCLEATED RED BLOOD CELLS: 0 /100 WBC
O2 SATURATION: 92 %
O2 SATURATION: 96 %
O2 SATURATION: 96 %
O2 SATURATION: 97 %
O2 SATURATION: 98 %
O2 SATURATION: 99 %
O2 SATURATION: 99 %
ORGANISM: ABNORMAL
OSMOLALITY CALCULATION: 288 MOSMOL/KG (ref 275–300)
PATHOLOGIST REVIEW: ABNORMAL
PCO2: 30 MMHG (ref 35–45)
PCO2: 31 MMHG (ref 35–45)
PCO2: 32 MMHG (ref 35–45)
PCO2: 34 MMHG (ref 35–45)
PCO2: 35 MMHG (ref 35–45)
PCO2: 37 MMHG (ref 35–45)
PDW BLD-RTO: 15.5 % (ref 11.5–14.5)
PH BLOOD GAS: 7.35 (ref 7.35–7.45)
PH BLOOD GAS: 7.36 (ref 7.35–7.45)
PH BLOOD GAS: 7.43 (ref 7.35–7.45)
PH BLOOD GAS: 7.44 (ref 7.35–7.45)
PH BLOOD GAS: 7.44 (ref 7.35–7.45)
PH BLOOD GAS: 7.45 (ref 7.35–7.45)
PH BLOOD GAS: 7.46 (ref 7.35–7.45)
PH BLOOD GAS: 7.48 (ref 7.35–7.45)
PH BLOOD GAS: 7.5 (ref 7.35–7.45)
PH BLOOD GAS: 7.51 (ref 7.35–7.45)
PH UA: 7.5
PHOSPHORUS: 1.6 MG/DL (ref 2.4–4.7)
PHOSPHORUS: 1.7 MG/DL (ref 2.4–4.7)
PHOSPHORUS: 1.7 MG/DL (ref 2.4–4.7)
PHOSPHORUS: 1.8 MG/DL (ref 2.4–4.7)
PHOSPHORUS: 2 MG/DL (ref 2.4–4.7)
PHOSPHORUS: 2.1 MG/DL (ref 2.4–4.7)
PHOSPHORUS: 2.1 MG/DL (ref 2.4–4.7)
PHOSPHORUS: 2.2 MG/DL (ref 2.4–4.7)
PHOSPHORUS: 2.5 MG/DL (ref 2.4–4.7)
PHOSPHORUS: 2.6 MG/DL (ref 2.4–4.7)
PHOSPHORUS: 2.7 MG/DL (ref 2.4–4.7)
PHOSPHORUS: 2.8 MG/DL (ref 2.4–4.7)
PHOSPHORUS: 2.8 MG/DL (ref 2.4–4.7)
PHOSPHORUS: 2.9 MG/DL (ref 2.4–4.7)
PHOSPHORUS: 3.1 MG/DL (ref 2.4–4.7)
PHOSPHORUS: 3.2 MG/DL (ref 2.4–4.7)
PHOSPHORUS: 3.4 MG/DL (ref 2.4–4.7)
PHOSPHORUS: 3.6 MG/DL (ref 2.4–4.7)
PHOSPHORUS: 3.7 MG/DL (ref 2.4–4.7)
PHOSPHORUS: 4.1 MG/DL (ref 2.4–4.7)
PHOSPHORUS: NORMAL MG/DL (ref 2.4–4.7)
PIP: 18 CMH2O
PIP: 22 CMH2O
PLATELET # BLD: 124 THOU/MM3 (ref 130–400)
PLATELET # BLD: 132 THOU/MM3 (ref 130–400)
PLATELET # BLD: 149 THOU/MM3 (ref 130–400)
PLATELET # BLD: 170 THOU/MM3 (ref 130–400)
PLATELET # BLD: 203 THOU/MM3 (ref 130–400)
PLATELET # BLD: 211 THOU/MM3 (ref 130–400)
PLATELET # BLD: 232 THOU/MM3 (ref 130–400)
PLATELET # BLD: 244 THOU/MM3 (ref 130–400)
PLATELET # BLD: 287 THOU/MM3 (ref 130–400)
PLATELET # BLD: 291 THOU/MM3 (ref 130–400)
PLATELET # BLD: 292 THOU/MM3 (ref 130–400)
PLATELET # BLD: 296 THOU/MM3 (ref 130–400)
PLATELET # BLD: 314 THOU/MM3 (ref 130–400)
PLATELET # BLD: 355 THOU/MM3 (ref 130–400)
PLATELET # BLD: 358 THOU/MM3 (ref 130–400)
PLATELET # BLD: 393 THOU/MM3 (ref 130–400)
PLATELET # BLD: 422 THOU/MM3 (ref 130–400)
PLATELET # BLD: 425 THOU/MM3 (ref 130–400)
PLATELET # BLD: 436 THOU/MM3 (ref 130–400)
PLATELET # BLD: 439 THOU/MM3 (ref 130–400)
PLATELET # BLD: 445 THOU/MM3 (ref 130–400)
PLATELET # BLD: 450 THOU/MM3 (ref 130–400)
PLATELET # BLD: 586 THOU/MM3 (ref 130–400)
PLATELET # BLD: 612 THOU/MM3 (ref 130–400)
PMV BLD AUTO: 10.1 FL (ref 9.4–12.4)
PMV BLD AUTO: 10.2 FL (ref 9.4–12.4)
PMV BLD AUTO: 10.4 FL (ref 9.4–12.4)
PMV BLD AUTO: 10.5 FL (ref 9.4–12.4)
PMV BLD AUTO: 10.6 FL (ref 9.4–12.4)
PMV BLD AUTO: 10.7 FL (ref 9.4–12.4)
PMV BLD AUTO: 10.7 FL (ref 9.4–12.4)
PMV BLD AUTO: 10.8 FL (ref 9.4–12.4)
PMV BLD AUTO: 10.9 FL (ref 9.4–12.4)
PMV BLD AUTO: 10.9 FL (ref 9.4–12.4)
PMV BLD AUTO: 11 FL (ref 9.4–12.4)
PMV BLD AUTO: 11.1 FL (ref 9.4–12.4)
PMV BLD AUTO: 8.2 FL (ref 7.4–10.4)
PMV BLD AUTO: 9.7 FL (ref 9.4–12.4)
PMV BLD AUTO: 9.9 FL (ref 9.4–12.4)
PO2: 108 MMHG (ref 71–104)
PO2: 110 MMHG (ref 71–104)
PO2: 115 MMHG (ref 71–104)
PO2: 127 MMHG (ref 71–104)
PO2: 60 MMHG (ref 71–104)
PO2: 70 MMHG (ref 71–104)
PO2: 79 MMHG (ref 71–104)
PO2: 87 MMHG (ref 71–104)
PO2: 94 MMHG (ref 71–104)
PO2: 96 MMHG (ref 71–104)
POTASSIUM SERPL-SCNC: 2.9 MEQ/L (ref 3.5–5.2)
POTASSIUM SERPL-SCNC: 3.1 MEQ/L (ref 3.5–5.2)
POTASSIUM SERPL-SCNC: 3.3 MEQ/L (ref 3.5–5.2)
POTASSIUM SERPL-SCNC: 3.4 MEQ/L (ref 3.5–5.2)
POTASSIUM SERPL-SCNC: 3.4 MEQ/L (ref 3.5–5.2)
POTASSIUM SERPL-SCNC: 3.5 MEQ/L (ref 3.5–5.2)
POTASSIUM SERPL-SCNC: 3.6 MEQ/L (ref 3.5–5.2)
POTASSIUM SERPL-SCNC: 3.7 MEQ/L (ref 3.5–5.2)
POTASSIUM SERPL-SCNC: 3.7 MEQ/L (ref 3.5–5.2)
POTASSIUM SERPL-SCNC: 3.8 MEQ/L (ref 3.5–5.2)
POTASSIUM SERPL-SCNC: 3.8 MEQ/L (ref 3.5–5.2)
POTASSIUM SERPL-SCNC: 3.9 MEQ/L (ref 3.5–5.2)
POTASSIUM SERPL-SCNC: 4 MEQ/L (ref 3.5–5.2)
POTASSIUM SERPL-SCNC: 4 MEQ/L (ref 3.5–5.2)
POTASSIUM SERPL-SCNC: 4.1 MEQ/L (ref 3.5–5.2)
POTASSIUM SERPL-SCNC: 4.2 MEQ/L (ref 3.5–5.2)
POTASSIUM SERPL-SCNC: 4.2 MEQ/L (ref 3.5–5.2)
POTASSIUM SERPL-SCNC: 4.4 MEQ/L (ref 3.5–5.2)
POTASSIUM SERPL-SCNC: 4.5 MEQ/L (ref 3.5–5.2)
POTASSIUM SERPL-SCNC: 4.5 MEQ/L (ref 3.5–5.2)
POTASSIUM SERPL-SCNC: 4.7 MEQ/L (ref 3.5–5.2)
POTASSIUM SERPL-SCNC: NORMAL MEQ/L (ref 3.5–5.2)
PRO-BNP: 1537 PG/ML (ref 0–1800)
PRO-BNP: 252.3 PG/ML (ref 0–1800)
PRO-BNP: 6410 PG/ML (ref 0–1800)
PRO-BNP: 6526 PG/ML (ref 0–1800)
PROCALCITONIN: 0.41 NG/ML (ref 0.01–0.09)
PROCALCITONIN: 0.88 NG/ML (ref 0.01–0.09)
PROCALCITONIN: 2.32 NG/ML (ref 0.01–0.09)
PROCALCITONIN: 3.44 NG/ML (ref 0.01–0.09)
PROSTATE SPECIFIC ANTIGEN: 2.03 NG/ML (ref 0–1)
PROTEIN UA: 100
RBC # BLD: 2.6 MILL/MM3 (ref 4.7–6.1)
RBC # BLD: 2.67 MILL/MM3 (ref 4.7–6.1)
RBC # BLD: 2.68 MILL/MM3 (ref 4.7–6.1)
RBC # BLD: 2.68 MILL/MM3 (ref 4.7–6.1)
RBC # BLD: 2.76 MILL/MM3 (ref 4.7–6.1)
RBC # BLD: 2.76 MILL/MM3 (ref 4.7–6.1)
RBC # BLD: 2.8 MILL/MM3 (ref 4.7–6.1)
RBC # BLD: 2.82 MILL/MM3 (ref 4.7–6.1)
RBC # BLD: 2.83 MILL/MM3 (ref 4.7–6.1)
RBC # BLD: 2.84 MILL/MM3 (ref 4.7–6.1)
RBC # BLD: 2.87 MILL/MM3 (ref 4.7–6.1)
RBC # BLD: 2.95 MILL/MM3 (ref 4.7–6.1)
RBC # BLD: 2.97 MILL/MM3 (ref 4.7–6.1)
RBC # BLD: 3.02 MILL/MM3 (ref 4.7–6.1)
RBC # BLD: 3.13 MILL/MM3 (ref 4.7–6.1)
RBC # BLD: 3.17 MILL/MM3 (ref 4.7–6.1)
RBC # BLD: 3.23 MILL/MM3 (ref 4.7–6.1)
RBC # BLD: 3.28 MILL/MM3 (ref 4.7–6.1)
RBC # BLD: 3.46 MILL/MM3 (ref 4.7–6.1)
RBC # BLD: 3.64 MILL/MM3 (ref 4.7–6.1)
RBC # BLD: 3.65 MILL/MM3 (ref 4.7–6.1)
RBC # BLD: 3.65 MILL/MM3 (ref 4.7–6.1)
RBC # BLD: 3.89 MILL/MM3 (ref 4.7–6.1)
RBC # BLD: 4.33 MILL/MM3 (ref 4.7–6.1)
RBC BAL: 175 /CUMM
RBC URINE: ABNORMAL /HPF
RENAL EPITHELIAL, UA: ABNORMAL
RESPIRATORY CULTURE: ABNORMAL
RESPIRATORY CULTURE: NORMAL
RETIC HEMOGLOBIN: 22.5 PG (ref 28.2–35.7)
RETICULOCYTE ABSOLUTE COUNT: 2 % (ref 0.5–2)
RH FACTOR: NORMAL
SEG NEUTROPHILS: 71 %
SEG NEUTROPHILS: 79.6 %
SEG NEUTROPHILS: 81 %
SEG NEUTROPHILS: 90 %
SEGMENTED NEUTROPHILS ABSOLUTE COUNT: 10.4 THOU/MM3 (ref 1.8–7.7)
SEGMENTED NEUTROPHILS ABSOLUTE COUNT: 11 THOU/MM3 (ref 1.8–7.7)
SEGMENTED NEUTROPHILS ABSOLUTE COUNT: 6 THOU/MM3 (ref 1.8–7.7)
SEGMENTED NEUTROPHILS ABSOLUTE COUNT: 9.1 THOU/MM3 (ref 1.8–7.7)
SEGMENTED NEUTROPHILS, BAL: 96 % (ref 0–3)
SET PEEP: 6 MMHG
SET PRESS SUPP: 10 CMH2O
SET PRESS SUPP: 8 CMH2O
SET RESPIRATORY RATE: 12 BPM
SODIUM BLD-SCNC: 137 MEQ/L (ref 135–145)
SODIUM BLD-SCNC: 138 MEQ/L (ref 135–145)
SODIUM BLD-SCNC: 139 MEQ/L (ref 135–145)
SODIUM BLD-SCNC: 140 MEQ/L (ref 135–145)
SODIUM BLD-SCNC: 141 MEQ/L (ref 135–145)
SODIUM BLD-SCNC: 141 MEQ/L (ref 135–145)
SODIUM BLD-SCNC: 142 MEQ/L (ref 135–145)
SODIUM BLD-SCNC: 142 MEQ/L (ref 135–145)
SODIUM BLD-SCNC: 143 MEQ/L (ref 135–145)
SODIUM BLD-SCNC: 144 MEQ/L (ref 135–145)
SODIUM BLD-SCNC: 145 MEQ/L (ref 135–145)
SODIUM BLD-SCNC: 145 MEQ/L (ref 135–145)
SODIUM BLD-SCNC: 146 MEQ/L (ref 135–145)
SODIUM BLD-SCNC: 146 MEQ/L (ref 135–145)
SODIUM BLD-SCNC: 147 MEQ/L (ref 135–145)
SODIUM BLD-SCNC: 148 MEQ/L (ref 135–145)
SODIUM BLD-SCNC: 149 MEQ/L (ref 135–145)
SODIUM BLD-SCNC: 150 MEQ/L (ref 135–145)
SODIUM BLD-SCNC: 150 MEQ/L (ref 135–145)
SODIUM BLD-SCNC: 154 MEQ/L (ref 135–145)
SODIUM BLD-SCNC: NORMAL MEQ/L (ref 135–145)
SOURCE, BLOOD GAS: ABNORMAL
SPECIFIC GRAVITY, URINE: 1.01 (ref 1–1.03)
STREP PNEUMO AG, UR: NEGATIVE
TOTAL CK: 14 U/L (ref 55–170)
TOTAL IRON BINDING CAPACITY: 168 UG/DL (ref 171–450)
TOTAL IRON BINDING CAPACITY: 181 UG/DL (ref 171–450)
TOTAL NUCLEATED CELLS BAL: 666 /CUMM
TOTAL PROTEIN: 4.5 G/DL (ref 6.1–8)
TOTAL PROTEIN: 4.9 G/DL (ref 6.1–8)
TOTAL PROTEIN: 5.7 G/DL (ref 6.1–8)
TOTAL PROTEIN: 6.7 G/DL (ref 6.1–8)
TOTAL VOLUME RECEIVED BAL: 30 ML
TRIGL SERPL-MCNC: 188 MG/DL (ref 0–199)
TROPONIN T: < 0.01 NG/ML
URINE CULTURE, ROUTINE: NORMAL
UROBILINOGEN, URINE: 0.2 EU/DL
VANCOMYCIN RESISTANT ENTEROCOCCUS: NEGATIVE
VANCOMYCIN TROUGH: 19 UG/ML (ref 5–15)
VIRAL CULTURE,RAPID,RESPIRATOR: NORMAL
WBC # BLD: 10.4 THOU/MM3 (ref 4.8–10.8)
WBC # BLD: 11.2 THOU/MM3 (ref 4.8–10.8)
WBC # BLD: 11.6 THOU/MM3 (ref 4.8–10.8)
WBC # BLD: 12.2 THOU/MM3 (ref 4.8–10.8)
WBC # BLD: 13.1 THOU/MM3 (ref 4.8–10.8)
WBC # BLD: 13.2 THOU/MM3 (ref 4.8–10.8)
WBC # BLD: 13.9 THOU/MM3 (ref 4.8–10.8)
WBC # BLD: 14.1 THOU/MM3 (ref 4.8–10.8)
WBC # BLD: 14.2 THOU/MM3 (ref 4.8–10.8)
WBC # BLD: 14.4 THOU/MM3 (ref 4.8–10.8)
WBC # BLD: 16.2 THOU/MM3 (ref 4.8–10.8)
WBC # BLD: 16.2 THOU/MM3 (ref 4.8–10.8)
WBC # BLD: 16.6 THOU/MM3 (ref 4.8–10.8)
WBC # BLD: 16.9 THOU/MM3 (ref 4.8–10.8)
WBC # BLD: 20 THOU/MM3 (ref 4.8–10.8)
WBC # BLD: 20.1 THOU/MM3 (ref 4.8–10.8)
WBC # BLD: 20.3 THOU/MM3 (ref 4.8–10.8)
WBC # BLD: 20.7 THOU/MM3 (ref 4.8–10.8)
WBC # BLD: 22.6 THOU/MM3 (ref 4.8–10.8)
WBC # BLD: 23.1 THOU/MM3 (ref 4.8–10.8)
WBC # BLD: 23.4 THOU/MM3 (ref 4.8–10.8)
WBC # BLD: 26.7 THOU/MM3 (ref 4.8–10.8)
WBC # BLD: 29 THOU/MM3 (ref 4.8–10.8)
WBC # BLD: 8.5 THOU/MM3 (ref 4.8–10.8)
WBC UA: ABNORMAL /HPF
YEAST: ABNORMAL

## 2018-01-01 PROCEDURE — 6360000002 HC RX W HCPCS: Performed by: PHARMACIST

## 2018-01-01 PROCEDURE — 88313 SPECIAL STAINS GROUP 2: CPT

## 2018-01-01 PROCEDURE — 99233 SBSQ HOSP IP/OBS HIGH 50: CPT | Performed by: FAMILY MEDICINE

## 2018-01-01 PROCEDURE — 71045 X-RAY EXAM CHEST 1 VIEW: CPT

## 2018-01-01 PROCEDURE — APPSS180 APP SPLIT SHARED TIME > 60 MINUTES: Performed by: NURSE PRACTITIONER

## 2018-01-01 PROCEDURE — 82947 ASSAY GLUCOSE BLOOD QUANT: CPT

## 2018-01-01 PROCEDURE — 84100 ASSAY OF PHOSPHORUS: CPT

## 2018-01-01 PROCEDURE — P9016 RBC LEUKOCYTES REDUCED: HCPCS

## 2018-01-01 PROCEDURE — 94660 CPAP INITIATION&MGMT: CPT

## 2018-01-01 PROCEDURE — 3600000056 HC PAIN LEVEL 4 BASE: Performed by: PAIN MEDICINE

## 2018-01-01 PROCEDURE — 6370000000 HC RX 637 (ALT 250 FOR IP): Performed by: PHYSICIAN ASSISTANT

## 2018-01-01 PROCEDURE — 6360000002 HC RX W HCPCS: Performed by: FAMILY MEDICINE

## 2018-01-01 PROCEDURE — 6360000002 HC RX W HCPCS: Performed by: SURGERY

## 2018-01-01 PROCEDURE — 2000000000 HC ICU R&B

## 2018-01-01 PROCEDURE — 97530 THERAPEUTIC ACTIVITIES: CPT

## 2018-01-01 PROCEDURE — 87449 NOS EACH ORGANISM AG IA: CPT

## 2018-01-01 PROCEDURE — 7100000010 HC PHASE II RECOVERY - FIRST 15 MIN: Performed by: PAIN MEDICINE

## 2018-01-01 PROCEDURE — 2709999900 HC NON-CHARGEABLE SUPPLY

## 2018-01-01 PROCEDURE — 2580000003 HC RX 258: Performed by: PHARMACIST

## 2018-01-01 PROCEDURE — 2580000003 HC RX 258: Performed by: INTERNAL MEDICINE

## 2018-01-01 PROCEDURE — 3609010800 HC BRONCHOSCOPY ALVEOLAR LAVAGE: Performed by: INTERNAL MEDICINE

## 2018-01-01 PROCEDURE — 97110 THERAPEUTIC EXERCISES: CPT

## 2018-01-01 PROCEDURE — 99232 SBSQ HOSP IP/OBS MODERATE 35: CPT | Performed by: SURGERY

## 2018-01-01 PROCEDURE — APPSS15 APP SPLIT SHARED TIME 0-15 MINUTES: Performed by: NURSE PRACTITIONER

## 2018-01-01 PROCEDURE — 36592 COLLECT BLOOD FROM PICC: CPT

## 2018-01-01 PROCEDURE — 94003 VENT MGMT INPAT SUBQ DAY: CPT

## 2018-01-01 PROCEDURE — 6370000000 HC RX 637 (ALT 250 FOR IP): Performed by: NURSE PRACTITIONER

## 2018-01-01 PROCEDURE — 6360000002 HC RX W HCPCS: Performed by: INTERNAL MEDICINE

## 2018-01-01 PROCEDURE — 2709999900 HC NON-CHARGEABLE SUPPLY: Performed by: SURGERY

## 2018-01-01 PROCEDURE — 6360000004 HC RX CONTRAST MEDICATION: Performed by: RADIOLOGY

## 2018-01-01 PROCEDURE — 85730 THROMBOPLASTIN TIME PARTIAL: CPT

## 2018-01-01 PROCEDURE — 82803 BLOOD GASES ANY COMBINATION: CPT

## 2018-01-01 PROCEDURE — 2500000003 HC RX 250 WO HCPCS: Performed by: INTERNAL MEDICINE

## 2018-01-01 PROCEDURE — 6360000002 HC RX W HCPCS

## 2018-01-01 PROCEDURE — 83735 ASSAY OF MAGNESIUM: CPT

## 2018-01-01 PROCEDURE — 6370000000 HC RX 637 (ALT 250 FOR IP): Performed by: PHARMACIST

## 2018-01-01 PROCEDURE — 87040 BLOOD CULTURE FOR BACTERIA: CPT

## 2018-01-01 PROCEDURE — 85027 COMPLETE CBC AUTOMATED: CPT

## 2018-01-01 PROCEDURE — 6360000002 HC RX W HCPCS: Performed by: HOSPITALIST

## 2018-01-01 PROCEDURE — 87186 SC STD MICRODIL/AGAR DIL: CPT

## 2018-01-01 PROCEDURE — 2500000003 HC RX 250 WO HCPCS: Performed by: NURSE PRACTITIONER

## 2018-01-01 PROCEDURE — C9113 INJ PANTOPRAZOLE SODIUM, VIA: HCPCS | Performed by: ANESTHESIOLOGY

## 2018-01-01 PROCEDURE — 74177 CT ABD & PELVIS W/CONTRAST: CPT

## 2018-01-01 PROCEDURE — 6370000000 HC RX 637 (ALT 250 FOR IP): Performed by: HOSPITALIST

## 2018-01-01 PROCEDURE — 87205 SMEAR GRAM STAIN: CPT

## 2018-01-01 PROCEDURE — 6370000000 HC RX 637 (ALT 250 FOR IP): Performed by: INTERNAL MEDICINE

## 2018-01-01 PROCEDURE — B4141ZZ FLUOROSCOPY OF SUPERIOR MESENTERIC ARTERY USING LOW OSMOLAR CONTRAST: ICD-10-PCS | Performed by: RADIOLOGY

## 2018-01-01 PROCEDURE — 83550 IRON BINDING TEST: CPT

## 2018-01-01 PROCEDURE — 80048 BASIC METABOLIC PNL TOTAL CA: CPT

## 2018-01-01 PROCEDURE — 6370000000 HC RX 637 (ALT 250 FOR IP): Performed by: RADIOLOGY

## 2018-01-01 PROCEDURE — L1810 KO ELASTIC WITH JOINTS: HCPCS

## 2018-01-01 PROCEDURE — 86850 RBC ANTIBODY SCREEN: CPT

## 2018-01-01 PROCEDURE — 36600 WITHDRAWAL OF ARTERIAL BLOOD: CPT

## 2018-01-01 PROCEDURE — 3600000014 HC SURGERY LEVEL 4 ADDTL 15MIN: Performed by: SURGERY

## 2018-01-01 PROCEDURE — 2709999900 HC NON-CHARGEABLE SUPPLY: Performed by: PAIN MEDICINE

## 2018-01-01 PROCEDURE — 6360000004 HC RX CONTRAST MEDICATION: Performed by: SURGERY

## 2018-01-01 PROCEDURE — 6360000002 HC RX W HCPCS: Performed by: ANESTHESIOLOGY

## 2018-01-01 PROCEDURE — 87106 FUNGI IDENTIFICATION YEAST: CPT

## 2018-01-01 PROCEDURE — 2500000003 HC RX 250 WO HCPCS

## 2018-01-01 PROCEDURE — 64493 INJ PARAVERT F JNT L/S 1 LEV: CPT | Performed by: PAIN MEDICINE

## 2018-01-01 PROCEDURE — 2700000000 HC OXYGEN THERAPY PER DAY

## 2018-01-01 PROCEDURE — 80053 COMPREHEN METABOLIC PANEL: CPT

## 2018-01-01 PROCEDURE — 97163 PT EVAL HIGH COMPLEX 45 MIN: CPT

## 2018-01-01 PROCEDURE — 97164 PT RE-EVAL EST PLAN CARE: CPT

## 2018-01-01 PROCEDURE — 85046 RETICYTE/HGB CONCENTRATE: CPT

## 2018-01-01 PROCEDURE — 86923 COMPATIBILITY TEST ELECTRIC: CPT

## 2018-01-01 PROCEDURE — 99024 POSTOP FOLLOW-UP VISIT: CPT | Performed by: SURGERY

## 2018-01-01 PROCEDURE — 82550 ASSAY OF CK (CPK): CPT

## 2018-01-01 PROCEDURE — S0028 INJECTION, FAMOTIDINE, 20 MG: HCPCS | Performed by: INTERNAL MEDICINE

## 2018-01-01 PROCEDURE — 1200000000 HC SEMI PRIVATE

## 2018-01-01 PROCEDURE — 99232 SBSQ HOSP IP/OBS MODERATE 35: CPT | Performed by: INTERNAL MEDICINE

## 2018-01-01 PROCEDURE — G8978 MOBILITY CURRENT STATUS: HCPCS

## 2018-01-01 PROCEDURE — 3430000000 HC RX DIAGNOSTIC RADIOPHARMACEUTICAL: Performed by: INTERNAL MEDICINE

## 2018-01-01 PROCEDURE — 88112 CYTOPATH CELL ENHANCE TECH: CPT

## 2018-01-01 PROCEDURE — 87500 VANOMYCIN DNA AMP PROBE: CPT

## 2018-01-01 PROCEDURE — 72148 MRI LUMBAR SPINE W/O DYE: CPT

## 2018-01-01 PROCEDURE — 99291 CRITICAL CARE FIRST HOUR: CPT | Performed by: INTERNAL MEDICINE

## 2018-01-01 PROCEDURE — 7100000011 HC PHASE II RECOVERY - ADDTL 15 MIN: Performed by: PAIN MEDICINE

## 2018-01-01 PROCEDURE — 5A1945Z RESPIRATORY VENTILATION, 24-96 CONSECUTIVE HOURS: ICD-10-PCS | Performed by: INTERNAL MEDICINE

## 2018-01-01 PROCEDURE — 2580000003 HC RX 258: Performed by: HOSPITALIST

## 2018-01-01 PROCEDURE — 87070 CULTURE OTHR SPECIMN AEROBIC: CPT

## 2018-01-01 PROCEDURE — 36415 COLL VENOUS BLD VENIPUNCTURE: CPT

## 2018-01-01 PROCEDURE — 84132 ASSAY OF SERUM POTASSIUM: CPT

## 2018-01-01 PROCEDURE — 82248 BILIRUBIN DIRECT: CPT

## 2018-01-01 PROCEDURE — 82948 REAGENT STRIP/BLOOD GLUCOSE: CPT

## 2018-01-01 PROCEDURE — 85610 PROTHROMBIN TIME: CPT

## 2018-01-01 PROCEDURE — 44120 REMOVAL OF SMALL INTESTINE: CPT | Performed by: SURGERY

## 2018-01-01 PROCEDURE — 0DB80ZZ EXCISION OF SMALL INTESTINE, OPEN APPROACH: ICD-10-PCS | Performed by: SURGERY

## 2018-01-01 PROCEDURE — 89051 BODY FLUID CELL COUNT: CPT

## 2018-01-01 PROCEDURE — APPSS30 APP SPLIT SHARED TIME 16-30 MINUTES: Performed by: NURSE PRACTITIONER

## 2018-01-01 PROCEDURE — 86901 BLOOD TYPING SEROLOGIC RH(D): CPT

## 2018-01-01 PROCEDURE — G8979 MOBILITY GOAL STATUS: HCPCS

## 2018-01-01 PROCEDURE — G0378 HOSPITAL OBSERVATION PER HR: HCPCS

## 2018-01-01 PROCEDURE — 3700000001 HC ADD 15 MINUTES (ANESTHESIA): Performed by: SURGERY

## 2018-01-01 PROCEDURE — 3700000000 HC ANESTHESIA ATTENDED CARE: Performed by: PAIN MEDICINE

## 2018-01-01 PROCEDURE — 99213 OFFICE O/P EST LOW 20 MIN: CPT | Performed by: NURSE PRACTITIONER

## 2018-01-01 PROCEDURE — 94640 AIRWAY INHALATION TREATMENT: CPT

## 2018-01-01 PROCEDURE — C1894 INTRO/SHEATH, NON-LASER: HCPCS

## 2018-01-01 PROCEDURE — 94770 HC ETCO2 MONITOR DAILY: CPT

## 2018-01-01 PROCEDURE — 92610 EVALUATE SWALLOWING FUNCTION: CPT

## 2018-01-01 PROCEDURE — 2580000003 HC RX 258: Performed by: NURSE PRACTITIONER

## 2018-01-01 PROCEDURE — 2500000003 HC RX 250 WO HCPCS: Performed by: NURSE ANESTHETIST, CERTIFIED REGISTERED

## 2018-01-01 PROCEDURE — 99233 SBSQ HOSP IP/OBS HIGH 50: CPT | Performed by: INTERNAL MEDICINE

## 2018-01-01 PROCEDURE — 84484 ASSAY OF TROPONIN QUANT: CPT

## 2018-01-01 PROCEDURE — 84145 PROCALCITONIN (PCT): CPT

## 2018-01-01 PROCEDURE — 2500000003 HC RX 250 WO HCPCS: Performed by: PHARMACIST

## 2018-01-01 PROCEDURE — 5A1945Z RESPIRATORY VENTILATION, 24-96 CONSECUTIVE HOURS: ICD-10-PCS | Performed by: ANESTHESIOLOGY

## 2018-01-01 PROCEDURE — 74018 RADEX ABDOMEN 1 VIEW: CPT

## 2018-01-01 PROCEDURE — 85018 HEMOGLOBIN: CPT

## 2018-01-01 PROCEDURE — 87252 VIRUS INOCULATION TISSUE: CPT

## 2018-01-01 PROCEDURE — 64636 DESTROY L/S FACET JNT ADDL: CPT | Performed by: PAIN MEDICINE

## 2018-01-01 PROCEDURE — 88108 CYTOPATH CONCENTRATE TECH: CPT

## 2018-01-01 PROCEDURE — 87086 URINE CULTURE/COLONY COUNT: CPT

## 2018-01-01 PROCEDURE — 82330 ASSAY OF CALCIUM: CPT

## 2018-01-01 PROCEDURE — 87496 CYTOMEG DNA AMP PROBE: CPT

## 2018-01-01 PROCEDURE — 99239 HOSP IP/OBS DSCHRG MGMT >30: CPT | Performed by: FAMILY MEDICINE

## 2018-01-01 PROCEDURE — 3609012800 HC EGD DIAGNOSTIC ONLY: Performed by: INTERNAL MEDICINE

## 2018-01-01 PROCEDURE — 2500000003 HC RX 250 WO HCPCS: Performed by: ANESTHESIOLOGY

## 2018-01-01 PROCEDURE — 84153 ASSAY OF PSA TOTAL: CPT

## 2018-01-01 PROCEDURE — 6360000002 HC RX W HCPCS: Performed by: PHYSICIAN ASSISTANT

## 2018-01-01 PROCEDURE — 99233 SBSQ HOSP IP/OBS HIGH 50: CPT | Performed by: SURGERY

## 2018-01-01 PROCEDURE — 99223 1ST HOSP IP/OBS HIGH 75: CPT | Performed by: SURGERY

## 2018-01-01 PROCEDURE — 2580000003 HC RX 258: Performed by: ORTHOPAEDIC SURGERY

## 2018-01-01 PROCEDURE — 2580000003 HC RX 258: Performed by: PHYSICIAN ASSISTANT

## 2018-01-01 PROCEDURE — 3600000057 HC PAIN LEVEL 4 ADDL 15 MIN: Performed by: PAIN MEDICINE

## 2018-01-01 PROCEDURE — 97166 OT EVAL MOD COMPLEX 45 MIN: CPT

## 2018-01-01 PROCEDURE — 99284 EMERGENCY DEPT VISIT MOD MDM: CPT

## 2018-01-01 PROCEDURE — 83605 ASSAY OF LACTIC ACID: CPT

## 2018-01-01 PROCEDURE — 3209999900 FLUORO FOR SURGICAL PROCEDURES

## 2018-01-01 PROCEDURE — 31500 INSERT EMERGENCY AIRWAY: CPT | Performed by: INTERNAL MEDICINE

## 2018-01-01 PROCEDURE — 85025 COMPLETE CBC W/AUTO DIFF WBC: CPT

## 2018-01-01 PROCEDURE — 0BJ08ZZ INSPECTION OF TRACHEOBRONCHIAL TREE, VIA NATURAL OR ARTIFICIAL OPENING ENDOSCOPIC: ICD-10-PCS | Performed by: INTERNAL MEDICINE

## 2018-01-01 PROCEDURE — 3700000001 HC ADD 15 MINUTES (ANESTHESIA): Performed by: PAIN MEDICINE

## 2018-01-01 PROCEDURE — 0DJ08ZZ INSPECTION OF UPPER INTESTINAL TRACT, VIA NATURAL OR ARTIFICIAL OPENING ENDOSCOPIC: ICD-10-PCS | Performed by: INTERNAL MEDICINE

## 2018-01-01 PROCEDURE — 93005 ELECTROCARDIOGRAM TRACING: CPT | Performed by: INTERNAL MEDICINE

## 2018-01-01 PROCEDURE — 43752 NASAL/OROGASTRIC W/TUBE PLMT: CPT

## 2018-01-01 PROCEDURE — 64494 INJ PARAVERT F JNT L/S 2 LEV: CPT | Performed by: PAIN MEDICINE

## 2018-01-01 PROCEDURE — 2580000003 HC RX 258: Performed by: ANESTHESIOLOGY

## 2018-01-01 PROCEDURE — 6360000002 HC RX W HCPCS: Performed by: NURSE ANESTHETIST, CERTIFIED REGISTERED

## 2018-01-01 PROCEDURE — 81001 URINALYSIS AUTO W/SCOPE: CPT

## 2018-01-01 PROCEDURE — 2060000000 HC ICU INTERMEDIATE R&B

## 2018-01-01 PROCEDURE — C9113 INJ PANTOPRAZOLE SODIUM, VIA: HCPCS | Performed by: HOSPITALIST

## 2018-01-01 PROCEDURE — 87255 GENET VIRUS ISOLATE HSV: CPT

## 2018-01-01 PROCEDURE — 2500000003 HC RX 250 WO HCPCS: Performed by: PAIN MEDICINE

## 2018-01-01 PROCEDURE — C1769 GUIDE WIRE: HCPCS

## 2018-01-01 PROCEDURE — 87081 CULTURE SCREEN ONLY: CPT

## 2018-01-01 PROCEDURE — 36591 DRAW BLOOD OFF VENOUS DEVICE: CPT

## 2018-01-01 PROCEDURE — 2720000010 HC SURG SUPPLY STERILE: Performed by: SURGERY

## 2018-01-01 PROCEDURE — 88307 TISSUE EXAM BY PATHOLOGIST: CPT

## 2018-01-01 PROCEDURE — 6360000002 HC RX W HCPCS: Performed by: PAIN MEDICINE

## 2018-01-01 PROCEDURE — 6360000002 HC RX W HCPCS: Performed by: NURSE PRACTITIONER

## 2018-01-01 PROCEDURE — A9560 TC99M LABELED RBC: HCPCS | Performed by: INTERNAL MEDICINE

## 2018-01-01 PROCEDURE — 36569 INSJ PICC 5 YR+ W/O IMAGING: CPT

## 2018-01-01 PROCEDURE — 85014 HEMATOCRIT: CPT

## 2018-01-01 PROCEDURE — 0BH18EZ INSERTION OF ENDOTRACHEAL AIRWAY INTO TRACHEA, VIA NATURAL OR ARTIFICIAL OPENING ENDOSCOPIC: ICD-10-PCS | Performed by: INTERNAL MEDICINE

## 2018-01-01 PROCEDURE — 6370000000 HC RX 637 (ALT 250 FOR IP): Performed by: ORTHOPAEDIC SURGERY

## 2018-01-01 PROCEDURE — 86900 BLOOD TYPING SEROLOGIC ABO: CPT

## 2018-01-01 PROCEDURE — 6360000002 HC RX W HCPCS: Performed by: RADIOLOGY

## 2018-01-01 PROCEDURE — 2709999900 HC NON-CHARGEABLE SUPPLY: Performed by: INTERNAL MEDICINE

## 2018-01-01 PROCEDURE — 78278 ACUTE GI BLOOD LOSS IMAGING: CPT

## 2018-01-01 PROCEDURE — 86885 COOMBS TEST INDIRECT QUAL: CPT

## 2018-01-01 PROCEDURE — 83880 ASSAY OF NATRIURETIC PEPTIDE: CPT

## 2018-01-01 PROCEDURE — 94760 N-INVAS EAR/PLS OXIMETRY 1: CPT

## 2018-01-01 PROCEDURE — 2580000003 HC RX 258: Performed by: SURGERY

## 2018-01-01 PROCEDURE — 84295 ASSAY OF SERUM SODIUM: CPT

## 2018-01-01 PROCEDURE — 82784 ASSAY IGA/IGD/IGG/IGM EACH: CPT

## 2018-01-01 PROCEDURE — 3600000004 HC SURGERY LEVEL 4 BASE: Performed by: SURGERY

## 2018-01-01 PROCEDURE — 74176 CT ABD & PELVIS W/O CONTRAST: CPT

## 2018-01-01 PROCEDURE — 2580000003 HC RX 258: Performed by: RADIOLOGY

## 2018-01-01 PROCEDURE — 99238 HOSP IP/OBS DSCHRG MGMT 30/<: CPT | Performed by: FAMILY MEDICINE

## 2018-01-01 PROCEDURE — P9045 ALBUMIN (HUMAN), 5%, 250 ML: HCPCS | Performed by: ANESTHESIOLOGY

## 2018-01-01 PROCEDURE — 3209999900 CT COMPARISON OF OUTSIDE FILMS

## 2018-01-01 PROCEDURE — 2580000003 HC RX 258: Performed by: FAMILY MEDICINE

## 2018-01-01 PROCEDURE — 6370000000 HC RX 637 (ALT 250 FOR IP): Performed by: ANESTHESIOLOGY

## 2018-01-01 PROCEDURE — 93010 ELECTROCARDIOGRAM REPORT: CPT | Performed by: INTERNAL MEDICINE

## 2018-01-01 PROCEDURE — 87075 CULTR BACTERIA EXCEPT BLOOD: CPT

## 2018-01-01 PROCEDURE — 94002 VENT MGMT INPAT INIT DAY: CPT

## 2018-01-01 PROCEDURE — 87147 CULTURE TYPE IMMUNOLOGIC: CPT

## 2018-01-01 PROCEDURE — 37799 UNLISTED PX VASCULAR SURGERY: CPT

## 2018-01-01 PROCEDURE — 99219 PR INITIAL OBSERVATION CARE/DAY 50 MINUTES: CPT | Performed by: PAIN MEDICINE

## 2018-01-01 PROCEDURE — 99220 PR INITIAL OBSERVATION CARE/DAY 70 MINUTES: CPT | Performed by: INTERNAL MEDICINE

## 2018-01-01 PROCEDURE — 64635 DESTROY LUMB/SAC FACET JNT: CPT | Performed by: PAIN MEDICINE

## 2018-01-01 PROCEDURE — 87140 CULTURE TYPE IMMUNOFLUORESC: CPT

## 2018-01-01 PROCEDURE — 87077 CULTURE AEROBIC IDENTIFY: CPT

## 2018-01-01 PROCEDURE — 36247 INS CATH ABD/L-EXT ART 3RD: CPT | Performed by: RADIOLOGY

## 2018-01-01 PROCEDURE — 2500000003 HC RX 250 WO HCPCS: Performed by: SURGERY

## 2018-01-01 PROCEDURE — C1751 CATH, INF, PER/CENT/MIDLINE: HCPCS

## 2018-01-01 PROCEDURE — 99291 CRITICAL CARE FIRST HOUR: CPT | Performed by: ANESTHESIOLOGY

## 2018-01-01 PROCEDURE — 99231 SBSQ HOSP IP/OBS SF/LOW 25: CPT | Performed by: FAMILY MEDICINE

## 2018-01-01 PROCEDURE — 2580000003 HC RX 258: Performed by: NURSE ANESTHETIST, CERTIFIED REGISTERED

## 2018-01-01 PROCEDURE — 96374 THER/PROPH/DIAG INJ IV PUSH: CPT

## 2018-01-01 PROCEDURE — 87633 RESP VIRUS 12-25 TARGETS: CPT

## 2018-01-01 PROCEDURE — 05HM33Z INSERTION OF INFUSION DEVICE INTO RIGHT INTERNAL JUGULAR VEIN, PERCUTANEOUS APPROACH: ICD-10-PCS | Performed by: ANESTHESIOLOGY

## 2018-01-01 PROCEDURE — 87253 VIRUS INOCULATE TISSUE ADDL: CPT

## 2018-01-01 PROCEDURE — 88305 TISSUE EXAM BY PATHOLOGIST: CPT

## 2018-01-01 PROCEDURE — 99292 CRITICAL CARE ADDL 30 MIN: CPT | Performed by: INTERNAL MEDICINE

## 2018-01-01 PROCEDURE — 93005 ELECTROCARDIOGRAM TRACING: CPT | Performed by: HOSPITALIST

## 2018-01-01 PROCEDURE — 76937 US GUIDE VASCULAR ACCESS: CPT

## 2018-01-01 PROCEDURE — 83690 ASSAY OF LIPASE: CPT

## 2018-01-01 PROCEDURE — G8987 SELF CARE CURRENT STATUS: HCPCS

## 2018-01-01 PROCEDURE — C1760 CLOSURE DEV, VASC: HCPCS

## 2018-01-01 PROCEDURE — 83540 ASSAY OF IRON: CPT

## 2018-01-01 PROCEDURE — 3700000000 HC ANESTHESIA ATTENDED CARE: Performed by: SURGERY

## 2018-01-01 PROCEDURE — 88312 SPECIAL STAINS GROUP 1: CPT

## 2018-01-01 PROCEDURE — 36430 TRANSFUSION BLD/BLD COMPNT: CPT

## 2018-01-01 PROCEDURE — 87641 MR-STAPH DNA AMP PROBE: CPT

## 2018-01-01 PROCEDURE — G8988 SELF CARE GOAL STATUS: HCPCS

## 2018-01-01 PROCEDURE — 02HV33Z INSERTION OF INFUSION DEVICE INTO SUPERIOR VENA CAVA, PERCUTANEOUS APPROACH: ICD-10-PCS | Performed by: FAMILY MEDICINE

## 2018-01-01 PROCEDURE — 84478 ASSAY OF TRIGLYCERIDES: CPT

## 2018-01-01 PROCEDURE — 92526 ORAL FUNCTION THERAPY: CPT

## 2018-01-01 PROCEDURE — 37244 VASC EMBOLIZE/OCCLUDE BLEED: CPT | Performed by: RADIOLOGY

## 2018-01-01 PROCEDURE — 73620 X-RAY EXAM OF FOOT: CPT

## 2018-01-01 PROCEDURE — 80069 RENAL FUNCTION PANEL: CPT

## 2018-01-01 PROCEDURE — 87899 AGENT NOS ASSAY W/OPTIC: CPT

## 2018-01-01 PROCEDURE — 80202 ASSAY OF VANCOMYCIN: CPT

## 2018-01-01 PROCEDURE — 6370000000 HC RX 637 (ALT 250 FOR IP)

## 2018-01-01 PROCEDURE — 87102 FUNGUS ISOLATION CULTURE: CPT

## 2018-01-01 PROCEDURE — 82728 ASSAY OF FERRITIN: CPT

## 2018-01-01 PROCEDURE — 82150 ASSAY OF AMYLASE: CPT

## 2018-01-01 PROCEDURE — 87529 HSV DNA AMP PROBE: CPT

## 2018-01-01 PROCEDURE — 31624 DX BRONCHOSCOPE/LAVAGE: CPT | Performed by: INTERNAL MEDICINE

## 2018-01-01 PROCEDURE — 99214 OFFICE O/P EST MOD 30 MIN: CPT | Performed by: NURSE PRACTITIONER

## 2018-01-01 PROCEDURE — 99291 CRITICAL CARE FIRST HOUR: CPT | Performed by: HOSPITALIST

## 2018-01-01 PROCEDURE — 36556 INSERT NON-TUNNEL CV CATH: CPT | Performed by: ANESTHESIOLOGY

## 2018-01-01 RX ORDER — ACETAMINOPHEN 650 MG/1
650 SUPPOSITORY RECTAL EVERY 4 HOURS PRN
Status: DISCONTINUED | OUTPATIENT
Start: 2018-01-01 | End: 2018-01-01 | Stop reason: HOSPADM

## 2018-01-01 RX ORDER — SODIUM CHLORIDE 0.9 % (FLUSH) 0.9 %
10 SYRINGE (ML) INJECTION PRN
Status: DISCONTINUED | OUTPATIENT
Start: 2018-01-01 | End: 2018-01-01

## 2018-01-01 RX ORDER — FENTANYL CITRATE 50 UG/ML
INJECTION, SOLUTION INTRAMUSCULAR; INTRAVENOUS
Status: COMPLETED
Start: 2018-01-01 | End: 2018-01-01

## 2018-01-01 RX ORDER — LIDOCAINE HYDROCHLORIDE 20 MG/ML
INJECTION, SOLUTION INFILTRATION; PERINEURAL PRN
Status: DISCONTINUED | OUTPATIENT
Start: 2018-01-01 | End: 2018-01-01 | Stop reason: SDUPTHER

## 2018-01-01 RX ORDER — FENTANYL CITRATE 50 UG/ML
25 INJECTION, SOLUTION INTRAMUSCULAR; INTRAVENOUS
Status: DISCONTINUED | OUTPATIENT
Start: 2018-01-01 | End: 2018-01-01

## 2018-01-01 RX ORDER — LORAZEPAM 2 MG/ML
1 INJECTION INTRAMUSCULAR
Status: CANCELLED | OUTPATIENT
Start: 2018-01-01

## 2018-01-01 RX ORDER — LORAZEPAM 2 MG/ML
1 INJECTION INTRAMUSCULAR
Status: DISCONTINUED | OUTPATIENT
Start: 2018-01-01 | End: 2018-01-01 | Stop reason: HOSPADM

## 2018-01-01 RX ORDER — SODIUM CHLORIDE 0.9 % (FLUSH) 0.9 %
10 SYRINGE (ML) INJECTION EVERY 12 HOURS SCHEDULED
Status: DISCONTINUED | OUTPATIENT
Start: 2018-01-01 | End: 2018-01-01 | Stop reason: HOSPADM

## 2018-01-01 RX ORDER — KETAMINE HCL IN NACL, ISO-OSM 100MG/10ML
SYRINGE (ML) INJECTION
Status: DISPENSED
Start: 2018-01-01 | End: 2018-01-01

## 2018-01-01 RX ORDER — ACETAMINOPHEN 650 MG/1
650 SUPPOSITORY RECTAL EVERY 4 HOURS PRN
Status: CANCELLED | OUTPATIENT
Start: 2018-01-01

## 2018-01-01 RX ORDER — GLYCOPYRROLATE 0.2 MG/ML
0.2 INJECTION INTRAMUSCULAR; INTRAVENOUS
Status: DISCONTINUED | OUTPATIENT
Start: 2018-01-01 | End: 2018-01-01 | Stop reason: HOSPADM

## 2018-01-01 RX ORDER — ACETAMINOPHEN 160 MG/5ML
650 SOLUTION ORAL EVERY 4 HOURS PRN
Status: DISCONTINUED | OUTPATIENT
Start: 2018-01-01 | End: 2018-01-01

## 2018-01-01 RX ORDER — SODIUM CHLORIDE 9 MG/ML
INJECTION, SOLUTION INTRAVENOUS CONTINUOUS
Status: DISCONTINUED | OUTPATIENT
Start: 2018-01-01 | End: 2018-01-01

## 2018-01-01 RX ORDER — POTASSIUM CHLORIDE 29.8 MG/ML
20 INJECTION INTRAVENOUS
Status: COMPLETED | OUTPATIENT
Start: 2018-01-01 | End: 2018-01-01

## 2018-01-01 RX ORDER — 0.9 % SODIUM CHLORIDE 0.9 %
500 INTRAVENOUS SOLUTION INTRAVENOUS ONCE
Status: COMPLETED | OUTPATIENT
Start: 2018-01-01 | End: 2018-01-01

## 2018-01-01 RX ORDER — MYCOPHENOLATE MOFETIL 250 MG/1
1000 CAPSULE ORAL 2 TIMES DAILY
Status: DISCONTINUED | OUTPATIENT
Start: 2018-01-01 | End: 2018-01-01 | Stop reason: SDUPTHER

## 2018-01-01 RX ORDER — SODIUM CHLORIDE 9 MG/ML
INJECTION, SOLUTION INTRAVENOUS CONTINUOUS PRN
Status: DISCONTINUED | OUTPATIENT
Start: 2018-01-01 | End: 2018-01-01 | Stop reason: SDUPTHER

## 2018-01-01 RX ORDER — KETAMINE HCL IN NACL, ISO-OSM 100MG/10ML
SYRINGE (ML) INJECTION
Status: COMPLETED
Start: 2018-01-01 | End: 2018-01-01

## 2018-01-01 RX ORDER — FUROSEMIDE 10 MG/ML
20 INJECTION INTRAMUSCULAR; INTRAVENOUS 2 TIMES DAILY
Status: COMPLETED | OUTPATIENT
Start: 2018-01-01 | End: 2018-01-01

## 2018-01-01 RX ORDER — HYDROCODONE BITARTRATE AND ACETAMINOPHEN 5; 325 MG/1; MG/1
2 TABLET ORAL EVERY 4 HOURS PRN
Status: DISCONTINUED | OUTPATIENT
Start: 2018-01-01 | End: 2018-01-01 | Stop reason: HOSPADM

## 2018-01-01 RX ORDER — PREDNISONE 10 MG/1
10 TABLET ORAL DAILY
Status: ON HOLD | COMMUNITY
End: 2018-01-01

## 2018-01-01 RX ORDER — TOBRAMYCIN AND DEXAMETHASONE 3; 1 MG/ML; MG/ML
1 SUSPENSION/ DROPS OPHTHALMIC 3 TIMES DAILY
Status: DISCONTINUED | OUTPATIENT
Start: 2018-01-01 | End: 2018-01-01

## 2018-01-01 RX ORDER — PROPOFOL 10 MG/ML
10 INJECTION, EMULSION INTRAVENOUS
Status: DISCONTINUED | OUTPATIENT
Start: 2018-01-01 | End: 2018-01-01

## 2018-01-01 RX ORDER — KETAMINE HYDROCHLORIDE 50 MG/ML
50 INJECTION, SOLUTION, CONCENTRATE INTRAMUSCULAR; INTRAVENOUS ONCE
Status: DISCONTINUED | OUTPATIENT
Start: 2018-01-01 | End: 2018-01-01

## 2018-01-01 RX ORDER — HYDROCHLOROTHIAZIDE 12.5 MG/1
12.5 CAPSULE, GELATIN COATED ORAL DAILY PRN
Status: ON HOLD | COMMUNITY
End: 2018-01-01

## 2018-01-01 RX ORDER — FUROSEMIDE 10 MG/ML
20 INJECTION INTRAMUSCULAR; INTRAVENOUS 2 TIMES DAILY
Status: DISCONTINUED | OUTPATIENT
Start: 2018-01-01 | End: 2018-01-01

## 2018-01-01 RX ORDER — DOCUSATE SODIUM 100 MG/1
100 CAPSULE, LIQUID FILLED ORAL PRN
Status: DISCONTINUED | OUTPATIENT
Start: 2018-01-01 | End: 2018-01-01

## 2018-01-01 RX ORDER — LIDOCAINE HYDROCHLORIDE 10 MG/ML
INJECTION, SOLUTION INFILTRATION; PERINEURAL PRN
Status: DISCONTINUED | OUTPATIENT
Start: 2018-01-01 | End: 2018-01-01 | Stop reason: HOSPADM

## 2018-01-01 RX ORDER — ALBUTEROL SULFATE 90 UG/1
2 AEROSOL, METERED RESPIRATORY (INHALATION) EVERY 6 HOURS PRN
Status: DISCONTINUED | OUTPATIENT
Start: 2018-01-01 | End: 2018-01-01 | Stop reason: HOSPADM

## 2018-01-01 RX ORDER — ACETAMINOPHEN 325 MG/1
650 TABLET ORAL EVERY 4 HOURS PRN
Status: DISCONTINUED | OUTPATIENT
Start: 2018-01-01 | End: 2018-01-01 | Stop reason: HOSPADM

## 2018-01-01 RX ORDER — SODIUM CHLORIDE 450 MG/100ML
INJECTION, SOLUTION INTRAVENOUS CONTINUOUS
Status: DISCONTINUED | OUTPATIENT
Start: 2018-01-01 | End: 2018-01-01

## 2018-01-01 RX ORDER — HEPARIN SODIUM 5000 [USP'U]/ML
5000 INJECTION, SOLUTION INTRAVENOUS; SUBCUTANEOUS EVERY 8 HOURS SCHEDULED
Status: DISCONTINUED | OUTPATIENT
Start: 2018-01-01 | End: 2018-01-01

## 2018-01-01 RX ORDER — HYDROCODONE BITARTRATE AND ACETAMINOPHEN 5; 325 MG/1; MG/1
1 TABLET ORAL EVERY 8 HOURS PRN
Qty: 48 TABLET | Refills: 0 | Status: SHIPPED | OUTPATIENT
Start: 2018-01-01 | End: 2018-01-01 | Stop reason: SDUPTHER

## 2018-01-01 RX ORDER — SODIUM CHLORIDE 0.9 % (FLUSH) 0.9 %
10 SYRINGE (ML) INJECTION PRN
Status: DISCONTINUED | OUTPATIENT
Start: 2018-01-01 | End: 2018-01-01 | Stop reason: SDUPTHER

## 2018-01-01 RX ORDER — FUROSEMIDE 10 MG/ML
20 INJECTION INTRAMUSCULAR; INTRAVENOUS ONCE
Status: COMPLETED | OUTPATIENT
Start: 2018-01-01 | End: 2018-01-01

## 2018-01-01 RX ORDER — BUMETANIDE 0.25 MG/ML
2 INJECTION, SOLUTION INTRAMUSCULAR; INTRAVENOUS ONCE
Status: COMPLETED | OUTPATIENT
Start: 2018-01-01 | End: 2018-01-01

## 2018-01-01 RX ORDER — FENTANYL CITRATE 50 UG/ML
25 INJECTION, SOLUTION INTRAMUSCULAR; INTRAVENOUS
Status: CANCELLED | OUTPATIENT
Start: 2018-01-01

## 2018-01-01 RX ORDER — MYCOPHENOLATE MOFETIL 250 MG/1
1000 CAPSULE ORAL 2 TIMES DAILY
Status: DISCONTINUED | OUTPATIENT
Start: 2018-01-01 | End: 2018-01-01

## 2018-01-01 RX ORDER — PROPOFOL 10 MG/ML
INJECTION, EMULSION INTRAVENOUS PRN
Status: DISCONTINUED | OUTPATIENT
Start: 2018-01-01 | End: 2018-01-01 | Stop reason: SDUPTHER

## 2018-01-01 RX ORDER — AMLODIPINE BESYLATE 10 MG/1
10 TABLET ORAL DAILY
Status: DISCONTINUED | OUTPATIENT
Start: 2018-01-01 | End: 2018-01-01 | Stop reason: HOSPADM

## 2018-01-01 RX ORDER — MORPHINE SULFATE/0.9% NACL/PF 1 MG/ML
SYRINGE (ML) INJECTION CONTINUOUS
Status: DISCONTINUED | OUTPATIENT
Start: 2018-01-01 | End: 2018-01-01 | Stop reason: HOSPADM

## 2018-01-01 RX ORDER — CEFAZOLIN SODIUM 1 G/3ML
INJECTION, POWDER, FOR SOLUTION INTRAMUSCULAR; INTRAVENOUS PRN
Status: DISCONTINUED | OUTPATIENT
Start: 2018-01-01 | End: 2018-01-01 | Stop reason: SDUPTHER

## 2018-01-01 RX ORDER — DOCUSATE SODIUM 100 MG/1
100 CAPSULE, LIQUID FILLED ORAL DAILY
Status: ON HOLD | COMMUNITY
End: 2018-01-01

## 2018-01-01 RX ORDER — SODIUM CHLORIDE 0.9 % (FLUSH) 0.9 %
10 SYRINGE (ML) INJECTION EVERY 12 HOURS SCHEDULED
Status: DISCONTINUED | OUTPATIENT
Start: 2018-01-01 | End: 2018-01-01 | Stop reason: SDUPTHER

## 2018-01-01 RX ORDER — CHOLECALCIFEROL (VITAMIN D3) 125 MCG
5 CAPSULE ORAL NIGHTLY PRN
Status: ON HOLD | COMMUNITY
End: 2018-01-01

## 2018-01-01 RX ORDER — ALBUMIN, HUMAN INJ 5% 5 %
12.5 SOLUTION INTRAVENOUS
Status: DISCONTINUED | OUTPATIENT
Start: 2018-01-01 | End: 2018-01-01

## 2018-01-01 RX ORDER — 0.9 % SODIUM CHLORIDE 0.9 %
250 INTRAVENOUS SOLUTION INTRAVENOUS ONCE
Status: DISCONTINUED | OUTPATIENT
Start: 2018-01-01 | End: 2018-01-01

## 2018-01-01 RX ORDER — MORPHINE SULFATE 2 MG/ML
INJECTION, SOLUTION INTRAMUSCULAR; INTRAVENOUS
Status: COMPLETED
Start: 2018-01-01 | End: 2018-01-01

## 2018-01-01 RX ORDER — SODIUM CHLORIDE 0.9 % (FLUSH) 0.9 %
10 SYRINGE (ML) INJECTION PRN
Status: CANCELLED | OUTPATIENT
Start: 2018-01-01

## 2018-01-01 RX ORDER — FUROSEMIDE 10 MG/ML
INJECTION INTRAMUSCULAR; INTRAVENOUS
Status: COMPLETED
Start: 2018-01-01 | End: 2018-01-01

## 2018-01-01 RX ORDER — HYDROCODONE BITARTRATE AND ACETAMINOPHEN 5; 325 MG/1; MG/1
1 TABLET ORAL EVERY 4 HOURS PRN
Status: DISCONTINUED | OUTPATIENT
Start: 2018-01-01 | End: 2018-01-01 | Stop reason: HOSPADM

## 2018-01-01 RX ORDER — PREDNISONE 10 MG/1
10 TABLET ORAL DAILY
Status: DISCONTINUED | OUTPATIENT
Start: 2018-01-01 | End: 2018-01-01

## 2018-01-01 RX ORDER — FENTANYL CITRATE 50 UG/ML
INJECTION, SOLUTION INTRAMUSCULAR; INTRAVENOUS PRN
Status: DISCONTINUED | OUTPATIENT
Start: 2018-01-01 | End: 2018-01-01 | Stop reason: SDUPTHER

## 2018-01-01 RX ORDER — DOCUSATE SODIUM 100 MG/1
100 CAPSULE, LIQUID FILLED ORAL 2 TIMES DAILY
Status: DISCONTINUED | OUTPATIENT
Start: 2018-01-01 | End: 2018-01-01

## 2018-01-01 RX ORDER — MORPHINE SULFATE 2 MG/ML
2 INJECTION, SOLUTION INTRAMUSCULAR; INTRAVENOUS
Status: DISPENSED | OUTPATIENT
Start: 2018-01-01 | End: 2018-01-01

## 2018-01-01 RX ORDER — ROCURONIUM BROMIDE 10 MG/ML
INJECTION, SOLUTION INTRAVENOUS PRN
Status: DISCONTINUED | OUTPATIENT
Start: 2018-01-01 | End: 2018-01-01 | Stop reason: SDUPTHER

## 2018-01-01 RX ORDER — BETAMETHASONE SODIUM PHOSPHATE AND BETAMETHASONE ACETATE 3; 3 MG/ML; MG/ML
INJECTION, SUSPENSION INTRA-ARTICULAR; INTRALESIONAL; INTRAMUSCULAR; SOFT TISSUE PRN
Status: DISCONTINUED | OUTPATIENT
Start: 2018-01-01 | End: 2018-01-01 | Stop reason: HOSPADM

## 2018-01-01 RX ORDER — MORPHINE SULFATE 4 MG/ML
4 INJECTION, SOLUTION INTRAMUSCULAR; INTRAVENOUS ONCE
Status: COMPLETED | OUTPATIENT
Start: 2018-01-01 | End: 2018-01-01

## 2018-01-01 RX ORDER — ACETAMINOPHEN 325 MG/1
650 TABLET ORAL EVERY 4 HOURS PRN
Status: DISCONTINUED | OUTPATIENT
Start: 2018-01-01 | End: 2018-01-01 | Stop reason: SDUPTHER

## 2018-01-01 RX ORDER — SUCCINYLCHOLINE CHLORIDE 20 MG/ML
INJECTION INTRAMUSCULAR; INTRAVENOUS PRN
Status: DISCONTINUED | OUTPATIENT
Start: 2018-01-01 | End: 2018-01-01 | Stop reason: SDUPTHER

## 2018-01-01 RX ORDER — PREDNISONE 10 MG/1
5 TABLET ORAL DAILY
Status: DISCONTINUED | OUTPATIENT
Start: 2018-01-01 | End: 2018-01-01 | Stop reason: HOSPADM

## 2018-01-01 RX ORDER — TRIAMTERENE AND HYDROCHLOROTHIAZIDE 37.5; 25 MG/1; MG/1
1 TABLET ORAL DAILY
Status: ON HOLD | COMMUNITY
End: 2018-01-01

## 2018-01-01 RX ORDER — ACETAMINOPHEN 650 MG/1
650 SUPPOSITORY RECTAL EVERY 4 HOURS PRN
Status: DISCONTINUED | OUTPATIENT
Start: 2018-01-01 | End: 2018-01-01

## 2018-01-01 RX ORDER — HYDROCODONE BITARTRATE AND ACETAMINOPHEN 5; 325 MG/1; MG/1
1 TABLET ORAL EVERY 8 HOURS PRN
Status: ON HOLD | COMMUNITY
End: 2018-01-01

## 2018-01-01 RX ORDER — PANTOPRAZOLE SODIUM 40 MG/1
40 TABLET, DELAYED RELEASE ORAL DAILY
Status: DISCONTINUED | OUTPATIENT
Start: 2018-01-01 | End: 2018-01-01 | Stop reason: HOSPADM

## 2018-01-01 RX ORDER — LEVOFLOXACIN 5 MG/ML
500 INJECTION, SOLUTION INTRAVENOUS EVERY 24 HOURS
Status: DISCONTINUED | OUTPATIENT
Start: 2018-01-01 | End: 2018-01-01

## 2018-01-01 RX ORDER — DEXTROSE MONOHYDRATE 25 G/50ML
12.5 INJECTION, SOLUTION INTRAVENOUS PRN
Status: DISCONTINUED | OUTPATIENT
Start: 2018-01-01 | End: 2018-01-01

## 2018-01-01 RX ORDER — SODIUM CHLORIDE, SODIUM LACTATE, POTASSIUM CHLORIDE, CALCIUM CHLORIDE 600; 310; 30; 20 MG/100ML; MG/100ML; MG/100ML; MG/100ML
INJECTION, SOLUTION INTRAVENOUS CONTINUOUS
Status: DISCONTINUED | OUTPATIENT
Start: 2018-01-01 | End: 2018-01-01

## 2018-01-01 RX ORDER — MORPHINE SULFATE 2 MG/ML
4 INJECTION, SOLUTION INTRAMUSCULAR; INTRAVENOUS ONCE
Status: COMPLETED | OUTPATIENT
Start: 2018-01-01 | End: 2018-01-01

## 2018-01-01 RX ORDER — MORPHINE SULFATE 2 MG/ML
2 INJECTION, SOLUTION INTRAMUSCULAR; INTRAVENOUS ONCE
Status: DISCONTINUED | OUTPATIENT
Start: 2018-01-01 | End: 2018-01-01

## 2018-01-01 RX ORDER — HYDROCODONE BITARTRATE AND ACETAMINOPHEN 5; 325 MG/1; MG/1
1 TABLET ORAL EVERY 8 HOURS PRN
Qty: 90 TABLET | Refills: 0 | Status: SHIPPED | OUTPATIENT
Start: 2018-01-01 | End: 2018-01-01

## 2018-01-01 RX ORDER — ONDANSETRON 2 MG/ML
4 INJECTION INTRAMUSCULAR; INTRAVENOUS EVERY 6 HOURS PRN
Status: DISCONTINUED | OUTPATIENT
Start: 2018-01-01 | End: 2018-01-01 | Stop reason: HOSPADM

## 2018-01-01 RX ORDER — HYDROCODONE BITARTRATE AND ACETAMINOPHEN 5; 325 MG/1; MG/1
1 TABLET ORAL EVERY 8 HOURS PRN
Qty: 90 TABLET | Refills: 0 | Status: SHIPPED | OUTPATIENT
Start: 2018-01-01 | End: 2018-01-01 | Stop reason: SDUPTHER

## 2018-01-01 RX ORDER — DOCUSATE SODIUM 100 MG/1
100 CAPSULE, LIQUID FILLED ORAL 2 TIMES DAILY
Status: DISCONTINUED | OUTPATIENT
Start: 2018-01-01 | End: 2018-01-01 | Stop reason: HOSPADM

## 2018-01-01 RX ORDER — GLYCOPYRROLATE 0.2 MG/ML
0.2 INJECTION INTRAMUSCULAR; INTRAVENOUS
Status: CANCELLED | OUTPATIENT
Start: 2018-01-01

## 2018-01-01 RX ORDER — DILTIAZEM HYDROCHLORIDE 5 MG/ML
15 INJECTION INTRAVENOUS ONCE
Status: COMPLETED | OUTPATIENT
Start: 2018-01-01 | End: 2018-01-01

## 2018-01-01 RX ORDER — SODIUM CHLORIDE 0.9 % (FLUSH) 0.9 %
10 SYRINGE (ML) INJECTION PRN
Status: DISCONTINUED | OUTPATIENT
Start: 2018-01-01 | End: 2018-01-01 | Stop reason: HOSPADM

## 2018-01-01 RX ORDER — BUPIVACAINE HYDROCHLORIDE 2.5 MG/ML
INJECTION, SOLUTION EPIDURAL; INFILTRATION; INTRACAUDAL PRN
Status: DISCONTINUED | OUTPATIENT
Start: 2018-01-01 | End: 2018-01-01 | Stop reason: HOSPADM

## 2018-01-01 RX ORDER — POTASSIUM CHLORIDE 29.8 MG/ML
20 INJECTION INTRAVENOUS ONCE
Status: COMPLETED | OUTPATIENT
Start: 2018-01-01 | End: 2018-01-01

## 2018-01-01 RX ORDER — LIDOCAINE HYDROCHLORIDE 10 MG/ML
INJECTION, SOLUTION EPIDURAL; INFILTRATION; INTRACAUDAL; PERINEURAL PRN
Status: DISCONTINUED | OUTPATIENT
Start: 2018-01-01 | End: 2018-01-01 | Stop reason: HOSPADM

## 2018-01-01 RX ORDER — LORAZEPAM 2 MG/ML
INJECTION INTRAMUSCULAR
Status: COMPLETED
Start: 2018-01-01 | End: 2018-01-01

## 2018-01-01 RX ORDER — DIGOXIN 0.25 MG/ML
125 INJECTION INTRAMUSCULAR; INTRAVENOUS ONCE
Status: COMPLETED | OUTPATIENT
Start: 2018-01-01 | End: 2018-01-01

## 2018-01-01 RX ORDER — ROPIVACAINE HYDROCHLORIDE 2 MG/ML
INJECTION, SOLUTION EPIDURAL; INFILTRATION; PERINEURAL PRN
Status: DISCONTINUED | OUTPATIENT
Start: 2018-01-01 | End: 2018-01-01 | Stop reason: HOSPADM

## 2018-01-01 RX ORDER — CYCLOBENZAPRINE HCL 10 MG
10 TABLET ORAL 3 TIMES DAILY PRN
Status: DISCONTINUED | OUTPATIENT
Start: 2018-01-01 | End: 2018-01-01 | Stop reason: HOSPADM

## 2018-01-01 RX ORDER — MORPHINE SULFATE 4 MG/ML
4 INJECTION, SOLUTION INTRAMUSCULAR; INTRAVENOUS
Status: ACTIVE | OUTPATIENT
Start: 2018-01-01 | End: 2018-01-01

## 2018-01-01 RX ORDER — MORPHINE SULFATE 2 MG/ML
2 INJECTION, SOLUTION INTRAMUSCULAR; INTRAVENOUS
Status: DISCONTINUED | OUTPATIENT
Start: 2018-01-01 | End: 2018-01-01 | Stop reason: HOSPADM

## 2018-01-01 RX ORDER — NICOTINE POLACRILEX 4 MG
15 LOZENGE BUCCAL PRN
Status: DISCONTINUED | OUTPATIENT
Start: 2018-01-01 | End: 2018-01-01

## 2018-01-01 RX ORDER — METOPROLOL TARTRATE 5 MG/5ML
5 INJECTION INTRAVENOUS ONCE
Status: COMPLETED | OUTPATIENT
Start: 2018-01-01 | End: 2018-01-01

## 2018-01-01 RX ORDER — MIDAZOLAM HYDROCHLORIDE 1 MG/ML
1 INJECTION INTRAMUSCULAR; INTRAVENOUS ONCE
Status: COMPLETED | OUTPATIENT
Start: 2018-01-01 | End: 2018-01-01

## 2018-01-01 RX ORDER — 0.9 % SODIUM CHLORIDE 0.9 %
250 INTRAVENOUS SOLUTION INTRAVENOUS ONCE
Status: COMPLETED | OUTPATIENT
Start: 2018-01-01 | End: 2018-01-01

## 2018-01-01 RX ORDER — MYCOPHENOLATE MOFETIL 500 MG/1
1000 TABLET ORAL 2 TIMES DAILY
Status: DISCONTINUED | OUTPATIENT
Start: 2018-01-01 | End: 2018-01-01 | Stop reason: HOSPADM

## 2018-01-01 RX ORDER — SODIUM CHLORIDE 0.9 % (FLUSH) 0.9 %
10 SYRINGE (ML) INJECTION EVERY 12 HOURS SCHEDULED
Status: DISCONTINUED | OUTPATIENT
Start: 2018-01-01 | End: 2018-01-01

## 2018-01-01 RX ORDER — MORPHINE SULFATE 4 MG/ML
INJECTION, SOLUTION INTRAMUSCULAR; INTRAVENOUS
Status: COMPLETED
Start: 2018-01-01 | End: 2018-01-01

## 2018-01-01 RX ORDER — LIDOCAINE HYDROCHLORIDE 10 MG/ML
5 INJECTION, SOLUTION EPIDURAL; INFILTRATION; INTRACAUDAL; PERINEURAL ONCE
Status: DISCONTINUED | OUTPATIENT
Start: 2018-01-01 | End: 2018-01-01

## 2018-01-01 RX ORDER — GINSENG 100 MG
CAPSULE ORAL ONCE
Status: COMPLETED | OUTPATIENT
Start: 2018-01-01 | End: 2018-01-01

## 2018-01-01 RX ORDER — PROPOFOL 10 MG/ML
INJECTION, EMULSION INTRAVENOUS
Status: COMPLETED
Start: 2018-01-01 | End: 2018-01-01

## 2018-01-01 RX ORDER — METOPROLOL TARTRATE 5 MG/5ML
5 INJECTION INTRAVENOUS EVERY 6 HOURS PRN
Status: DISCONTINUED | OUTPATIENT
Start: 2018-01-01 | End: 2018-01-01

## 2018-01-01 RX ORDER — SODIUM CHLORIDE 0.9 % (FLUSH) 0.9 %
10 SYRINGE (ML) INJECTION EVERY 12 HOURS SCHEDULED
Status: CANCELLED | OUTPATIENT
Start: 2018-01-01

## 2018-01-01 RX ORDER — FENTANYL CITRATE 50 UG/ML
25 INJECTION, SOLUTION INTRAMUSCULAR; INTRAVENOUS ONCE
Status: COMPLETED | OUTPATIENT
Start: 2018-01-01 | End: 2018-01-01

## 2018-01-01 RX ORDER — MORPHINE SULFATE/0.9% NACL/PF 1 MG/ML
SYRINGE (ML) INJECTION CONTINUOUS
Status: CANCELLED | OUTPATIENT
Start: 2018-01-01

## 2018-01-01 RX ORDER — METHYLPREDNISOLONE ACETATE 80 MG/ML
INJECTION, SUSPENSION INTRA-ARTICULAR; INTRALESIONAL; INTRAMUSCULAR; SOFT TISSUE PRN
Status: DISCONTINUED | OUTPATIENT
Start: 2018-01-01 | End: 2018-01-01 | Stop reason: HOSPADM

## 2018-01-01 RX ORDER — DEXTROSE MONOHYDRATE 50 MG/ML
100 INJECTION, SOLUTION INTRAVENOUS PRN
Status: DISCONTINUED | OUTPATIENT
Start: 2018-01-01 | End: 2018-01-01

## 2018-01-01 RX ORDER — SODIUM CHLORIDE, SODIUM LACTATE, POTASSIUM CHLORIDE, AND CALCIUM CHLORIDE .6; .31; .03; .02 G/100ML; G/100ML; G/100ML; G/100ML
500 INJECTION, SOLUTION INTRAVENOUS ONCE
Status: COMPLETED | OUTPATIENT
Start: 2018-01-01 | End: 2018-01-01

## 2018-01-01 RX ORDER — MORPHINE SULFATE 4 MG/ML
4 INJECTION, SOLUTION INTRAMUSCULAR; INTRAVENOUS ONCE
Status: DISCONTINUED | OUTPATIENT
Start: 2018-01-01 | End: 2018-01-01

## 2018-01-01 RX ORDER — PANTOPRAZOLE SODIUM 40 MG/10ML
40 INJECTION, POWDER, LYOPHILIZED, FOR SOLUTION INTRAVENOUS 2 TIMES DAILY
Status: DISCONTINUED | OUTPATIENT
Start: 2018-01-01 | End: 2018-01-01

## 2018-01-01 RX ORDER — IPRATROPIUM BROMIDE AND ALBUTEROL SULFATE 2.5; .5 MG/3ML; MG/3ML
1 SOLUTION RESPIRATORY (INHALATION)
Status: DISCONTINUED | OUTPATIENT
Start: 2018-01-01 | End: 2018-01-01

## 2018-01-01 RX ORDER — KETAMINE HYDROCHLORIDE 50 MG/ML
INJECTION, SOLUTION, CONCENTRATE INTRAMUSCULAR; INTRAVENOUS
Status: COMPLETED | OUTPATIENT
Start: 2018-01-01 | End: 2018-01-01

## 2018-01-01 RX ORDER — POTASSIUM CHLORIDE 20 MEQ/1
40 TABLET, EXTENDED RELEASE ORAL ONCE
Status: COMPLETED | OUTPATIENT
Start: 2018-01-01 | End: 2018-01-01

## 2018-01-01 RX ORDER — PANTOPRAZOLE SODIUM 40 MG/10ML
40 INJECTION, POWDER, LYOPHILIZED, FOR SOLUTION INTRAVENOUS DAILY
Status: DISCONTINUED | OUTPATIENT
Start: 2018-01-01 | End: 2018-01-01

## 2018-01-01 RX ORDER — METOPROLOL TARTRATE 5 MG/5ML
INJECTION INTRAVENOUS
Status: COMPLETED
Start: 2018-01-01 | End: 2018-01-01

## 2018-01-01 RX ORDER — FENTANYL CITRATE 50 UG/ML
50 INJECTION, SOLUTION INTRAMUSCULAR; INTRAVENOUS ONCE
Status: COMPLETED | OUTPATIENT
Start: 2018-01-01 | End: 2018-01-01

## 2018-01-01 RX ORDER — LANOLIN ALCOHOL/MO/W.PET/CERES
6 CREAM (GRAM) TOPICAL NIGHTLY PRN
Status: DISCONTINUED | OUTPATIENT
Start: 2018-01-01 | End: 2018-01-01

## 2018-01-01 RX ORDER — ALBUTEROL SULFATE 2.5 MG/3ML
2.5 SOLUTION RESPIRATORY (INHALATION) EVERY 6 HOURS PRN
Status: DISCONTINUED | OUTPATIENT
Start: 2018-01-01 | End: 2018-01-01 | Stop reason: HOSPADM

## 2018-01-01 RX ORDER — MAGNESIUM SULFATE IN WATER 40 MG/ML
2 INJECTION, SOLUTION INTRAVENOUS ONCE
Status: COMPLETED | OUTPATIENT
Start: 2018-01-01 | End: 2018-01-01

## 2018-01-01 RX ORDER — HYDRALAZINE HYDROCHLORIDE 20 MG/ML
10 INJECTION INTRAMUSCULAR; INTRAVENOUS EVERY 6 HOURS PRN
Status: DISCONTINUED | OUTPATIENT
Start: 2018-01-01 | End: 2018-01-01

## 2018-01-01 RX ORDER — ONDANSETRON 2 MG/ML
4 INJECTION INTRAMUSCULAR; INTRAVENOUS EVERY 6 HOURS PRN
Status: CANCELLED | OUTPATIENT
Start: 2018-01-01

## 2018-01-01 RX ORDER — MORPHINE SULFATE/0.9% NACL/PF 1 MG/ML
SYRINGE (ML) INJECTION CONTINUOUS
Status: DISCONTINUED | OUTPATIENT
Start: 2018-01-01 | End: 2018-01-01

## 2018-01-01 RX ORDER — FLUCONAZOLE 2 MG/ML
400 INJECTION, SOLUTION INTRAVENOUS EVERY 24 HOURS
Status: DISCONTINUED | OUTPATIENT
Start: 2018-01-01 | End: 2018-01-01 | Stop reason: ALTCHOICE

## 2018-01-01 RX ORDER — CHLORHEXIDINE GLUCONATE 0.12 MG/ML
15 RINSE ORAL 4 TIMES DAILY
Status: DISCONTINUED | OUTPATIENT
Start: 2018-01-01 | End: 2018-01-01

## 2018-01-01 RX ORDER — IPRATROPIUM BROMIDE AND ALBUTEROL SULFATE 2.5; .5 MG/3ML; MG/3ML
1 SOLUTION RESPIRATORY (INHALATION) EVERY 4 HOURS
Status: DISCONTINUED | OUTPATIENT
Start: 2018-01-01 | End: 2018-01-01

## 2018-01-01 RX ADMIN — FLUCONAZOLE, SODIUM CHLORIDE 400 MG: 2 INJECTION INTRAVENOUS at 11:00

## 2018-01-01 RX ADMIN — Medication 5 MG: at 20:34

## 2018-01-01 RX ADMIN — ACETAMINOPHEN 650 MG: 650 SUPPOSITORY RECTAL at 17:46

## 2018-01-01 RX ADMIN — LEVOFLOXACIN 500 MG: 5 INJECTION, SOLUTION INTRAVENOUS at 09:01

## 2018-01-01 RX ADMIN — ENOXAPARIN SODIUM 90 MG: 100 INJECTION SUBCUTANEOUS at 23:03

## 2018-01-01 RX ADMIN — Medication 10 ML: at 20:37

## 2018-01-01 RX ADMIN — ACETAMINOPHEN 650 MG: 650 SUPPOSITORY RECTAL at 02:42

## 2018-01-01 RX ADMIN — PANTOPRAZOLE SODIUM 40 MG: 40 INJECTION, POWDER, FOR SOLUTION INTRAVENOUS at 08:20

## 2018-01-01 RX ADMIN — GELATIN ABSORBABLE SPONGE 12-7 MM 1 EACH: 12-7 MISC at 16:02

## 2018-01-01 RX ADMIN — Medication 5 MCG/MIN: at 07:56

## 2018-01-01 RX ADMIN — Medication 10 ML: at 08:36

## 2018-01-01 RX ADMIN — PREDNISONE 5 MG: 5 TABLET ORAL at 18:03

## 2018-01-01 RX ADMIN — ENOXAPARIN SODIUM 80 MG: 100 INJECTION SUBCUTANEOUS at 00:11

## 2018-01-01 RX ADMIN — MEROPENEM 1 G: 1 INJECTION, POWDER, FOR SOLUTION INTRAVENOUS at 02:43

## 2018-01-01 RX ADMIN — TOBRAMYCIN AND DEXAMETHASONE 1 DROP: 3; 1 SUSPENSION/ DROPS OPHTHALMIC at 08:20

## 2018-01-01 RX ADMIN — ASCORBIC ACID 1500 MG: 500 INJECTION INTRAVENOUS at 05:11

## 2018-01-01 RX ADMIN — VANCOMYCIN HYDROCHLORIDE 1250 MG: 5 INJECTION, POWDER, LYOPHILIZED, FOR SOLUTION INTRAVENOUS at 01:50

## 2018-01-01 RX ADMIN — FENTANYL CITRATE 50 MCG/HR: 50 INJECTION, SOLUTION INTRAMUSCULAR; INTRAVENOUS at 07:18

## 2018-01-01 RX ADMIN — PANTOPRAZOLE SODIUM 40 MG: 40 INJECTION, POWDER, FOR SOLUTION INTRAVENOUS at 20:33

## 2018-01-01 RX ADMIN — ENOXAPARIN SODIUM 80 MG: 100 INJECTION SUBCUTANEOUS at 23:08

## 2018-01-01 RX ADMIN — POTASSIUM PHOSPHATE, MONOBASIC AND POTASSIUM PHOSPHATE, DIBASIC 13 MMOL: 224; 236 INJECTION, SOLUTION INTRAVENOUS at 21:39

## 2018-01-01 RX ADMIN — VANCOMYCIN HYDROCHLORIDE 1250 MG: 5 INJECTION, POWDER, LYOPHILIZED, FOR SOLUTION INTRAVENOUS at 11:11

## 2018-01-01 RX ADMIN — ACETAMINOPHEN 650 MG: 650 SUPPOSITORY RECTAL at 20:23

## 2018-01-01 RX ADMIN — Medication 10 ML: at 10:11

## 2018-01-01 RX ADMIN — FUROSEMIDE 20 MG: 10 INJECTION, SOLUTION INTRAMUSCULAR; INTRAVENOUS at 07:26

## 2018-01-01 RX ADMIN — PANTOPRAZOLE SODIUM 40 MG: 40 INJECTION, POWDER, FOR SOLUTION INTRAVENOUS at 20:34

## 2018-01-01 RX ADMIN — MEROPENEM 1 G: 1 INJECTION, POWDER, FOR SOLUTION INTRAVENOUS at 23:56

## 2018-01-01 RX ADMIN — SODIUM CHLORIDE 250 ML: 9 INJECTION, SOLUTION INTRAVENOUS at 16:50

## 2018-01-01 RX ADMIN — GANCICLOVIR SODIUM 200 MG: 500 INJECTION, POWDER, LYOPHILIZED, FOR SOLUTION INTRAVENOUS at 15:19

## 2018-01-01 RX ADMIN — PANTOPRAZOLE SODIUM 40 MG: 40 INJECTION, POWDER, FOR SOLUTION INTRAVENOUS at 09:08

## 2018-01-01 RX ADMIN — FENTANYL CITRATE 25 MCG: 50 INJECTION INTRAMUSCULAR; INTRAVENOUS at 15:24

## 2018-01-01 RX ADMIN — PIPERACILLIN SODIUM AND TAZOBACTAM SODIUM 3.38 G: 3; .375 INJECTION, POWDER, LYOPHILIZED, FOR SOLUTION INTRAVENOUS at 00:14

## 2018-01-01 RX ADMIN — POTASSIUM PHOSPHATE, MONOBASIC AND POTASSIUM PHOSPHATE, DIBASIC 12 MMOL: 224; 236 INJECTION, SOLUTION INTRAVENOUS at 08:28

## 2018-01-01 RX ADMIN — POTASSIUM CHLORIDE 20 MEQ: 29.8 INJECTION, SOLUTION INTRAVENOUS at 09:55

## 2018-01-01 RX ADMIN — Medication 50 MG: at 09:11

## 2018-01-01 RX ADMIN — ACYCLOVIR SODIUM 400 MG: 50 INJECTION, SOLUTION INTRAVENOUS at 08:02

## 2018-01-01 RX ADMIN — PROPOFOL 25 MCG/KG/MIN: 10 INJECTION, EMULSION INTRAVENOUS at 12:23

## 2018-01-01 RX ADMIN — MEROPENEM 1 G: 1 INJECTION, POWDER, FOR SOLUTION INTRAVENOUS at 12:10

## 2018-01-01 RX ADMIN — HYDROCORTISONE SODIUM SUCCINATE 50 MG: 100 INJECTION, POWDER, FOR SOLUTION INTRAMUSCULAR; INTRAVENOUS at 12:07

## 2018-01-01 RX ADMIN — POTASSIUM PHOSPHATE, MONOBASIC AND POTASSIUM PHOSPHATE, DIBASIC: 224; 236 INJECTION, SOLUTION INTRAVENOUS at 17:17

## 2018-01-01 RX ADMIN — Medication 2 UNITS: at 12:56

## 2018-01-01 RX ADMIN — ACYCLOVIR SODIUM 400 MG: 50 INJECTION, SOLUTION INTRAVENOUS at 15:11

## 2018-01-01 RX ADMIN — GANCICLOVIR SODIUM 200 MG: 500 INJECTION, POWDER, LYOPHILIZED, FOR SOLUTION INTRAVENOUS at 16:28

## 2018-01-01 RX ADMIN — PIPERACILLIN SODIUM AND TAZOBACTAM SODIUM 3.38 G: 3; .375 INJECTION, POWDER, LYOPHILIZED, FOR SOLUTION INTRAVENOUS at 00:40

## 2018-01-01 RX ADMIN — PIPERACILLIN SODIUM AND TAZOBACTAM SODIUM 3.38 G: 3; .375 INJECTION, POWDER, LYOPHILIZED, FOR SOLUTION INTRAVENOUS at 17:19

## 2018-01-01 RX ADMIN — Medication 6 MG: at 20:35

## 2018-01-01 RX ADMIN — Medication 10 ML: at 03:56

## 2018-01-01 RX ADMIN — MAGNESIUM SULFATE HEPTAHYDRATE 2 G: 40 INJECTION, SOLUTION INTRAVENOUS at 20:07

## 2018-01-01 RX ADMIN — Medication 10 ML: at 09:00

## 2018-01-01 RX ADMIN — POTASSIUM PHOSPHATE, MONOBASIC AND POTASSIUM PHOSPHATE, DIBASIC: 224; 236 INJECTION, SOLUTION INTRAVENOUS at 17:52

## 2018-01-01 RX ADMIN — SODIUM CHLORIDE: 4.5 INJECTION, SOLUTION INTRAVENOUS at 15:33

## 2018-01-01 RX ADMIN — Medication 15 ML: at 16:27

## 2018-01-01 RX ADMIN — HYDRALAZINE HYDROCHLORIDE 10 MG: 20 INJECTION INTRAMUSCULAR; INTRAVENOUS at 14:36

## 2018-01-01 RX ADMIN — MEROPENEM 1 G: 1 INJECTION, POWDER, FOR SOLUTION INTRAVENOUS at 20:14

## 2018-01-01 RX ADMIN — MORPHINE SULFATE 30 MG: 1 INJECTION INTRAVENOUS at 23:44

## 2018-01-01 RX ADMIN — POTASSIUM CHLORIDE 20 MEQ: 400 INJECTION, SOLUTION INTRAVENOUS at 21:02

## 2018-01-01 RX ADMIN — Medication 29.7 MILLICURIE: at 10:45

## 2018-01-01 RX ADMIN — CALCIUM GLUCONATE: 98 INJECTION, SOLUTION INTRAVENOUS at 18:36

## 2018-01-01 RX ADMIN — ENOXAPARIN SODIUM 90 MG: 100 INJECTION SUBCUTANEOUS at 22:31

## 2018-01-01 RX ADMIN — FUROSEMIDE 20 MG: 10 INJECTION, SOLUTION INTRAMUSCULAR; INTRAVENOUS at 18:21

## 2018-01-01 RX ADMIN — TOBRAMYCIN AND DEXAMETHASONE 1 DROP: 3; 1 SUSPENSION/ DROPS OPHTHALMIC at 20:00

## 2018-01-01 RX ADMIN — Medication 15 ML: at 19:47

## 2018-01-01 RX ADMIN — IPRATROPIUM BROMIDE AND ALBUTEROL SULFATE 1 AMPULE: .5; 3 SOLUTION RESPIRATORY (INHALATION) at 12:06

## 2018-01-01 RX ADMIN — MEROPENEM 1 G: 1 INJECTION, POWDER, FOR SOLUTION INTRAVENOUS at 03:43

## 2018-01-01 RX ADMIN — MORPHINE SULFATE 2 MG: 2 INJECTION, SOLUTION INTRAMUSCULAR; INTRAVENOUS at 09:23

## 2018-01-01 RX ADMIN — MYCOPHENOLATE MOFETIL 1000 MG: 500 TABLET ORAL at 08:28

## 2018-01-01 RX ADMIN — ASCORBIC ACID 1500 MG: 500 INJECTION INTRAVENOUS at 10:12

## 2018-01-01 RX ADMIN — FLUCONAZOLE, SODIUM CHLORIDE 400 MG: 2 INJECTION INTRAVENOUS at 11:47

## 2018-01-01 RX ADMIN — MEROPENEM 1 G: 1 INJECTION, POWDER, FOR SOLUTION INTRAVENOUS at 17:24

## 2018-01-01 RX ADMIN — THIAMINE HYDROCHLORIDE 200 MG: 100 INJECTION, SOLUTION INTRAMUSCULAR; INTRAVENOUS at 07:52

## 2018-01-01 RX ADMIN — Medication 10 ML: at 20:53

## 2018-01-01 RX ADMIN — MEROPENEM 1 G: 1 INJECTION, POWDER, FOR SOLUTION INTRAVENOUS at 18:17

## 2018-01-01 RX ADMIN — SULFAMETHOXAZOLE AND TRIMETHOPRIM 433.6 MG: 80; 16 INJECTION, SOLUTION, CONCENTRATE INTRAVENOUS at 22:00

## 2018-01-01 RX ADMIN — MEROPENEM 1 G: 1 INJECTION, POWDER, FOR SOLUTION INTRAVENOUS at 10:02

## 2018-01-01 RX ADMIN — IPRATROPIUM BROMIDE AND ALBUTEROL SULFATE 1 AMPULE: .5; 3 SOLUTION RESPIRATORY (INHALATION) at 15:02

## 2018-01-01 RX ADMIN — FUROSEMIDE 20 MG: 10 INJECTION, SOLUTION INTRAMUSCULAR; INTRAVENOUS at 09:10

## 2018-01-01 RX ADMIN — Medication 10 ML: at 22:22

## 2018-01-01 RX ADMIN — MORPHINE SULFATE 30 MG: 1 INJECTION INTRAVENOUS at 12:13

## 2018-01-01 RX ADMIN — CALCIUM GLUCONATE: 98 INJECTION, SOLUTION INTRAVENOUS at 17:26

## 2018-01-01 RX ADMIN — ASCORBIC ACID 1500 MG: 500 INJECTION INTRAVENOUS at 04:30

## 2018-01-01 RX ADMIN — Medication 10 ML: at 08:00

## 2018-01-01 RX ADMIN — FENTANYL CITRATE 25 MCG/HR: 50 INJECTION, SOLUTION INTRAMUSCULAR; INTRAVENOUS at 06:34

## 2018-01-01 RX ADMIN — FENTANYL CITRATE 50 MCG/HR: 50 INJECTION, SOLUTION INTRAMUSCULAR; INTRAVENOUS at 11:20

## 2018-01-01 RX ADMIN — MICAFUNGIN SODIUM 150 MG: 20 INJECTION, POWDER, LYOPHILIZED, FOR SOLUTION INTRAVENOUS at 11:14

## 2018-01-01 RX ADMIN — VANCOMYCIN HYDROCHLORIDE 1250 MG: 5 INJECTION, POWDER, LYOPHILIZED, FOR SOLUTION INTRAVENOUS at 18:06

## 2018-01-01 RX ADMIN — IPRATROPIUM BROMIDE AND ALBUTEROL SULFATE 1 AMPULE: .5; 3 SOLUTION RESPIRATORY (INHALATION) at 21:17

## 2018-01-01 RX ADMIN — TOBRAMYCIN AND DEXAMETHASONE 1 DROP: 3; 1 SUSPENSION/ DROPS OPHTHALMIC at 09:37

## 2018-01-01 RX ADMIN — PROPOFOL 10 MCG/KG/MIN: 10 INJECTION, EMULSION INTRAVENOUS at 07:34

## 2018-01-01 RX ADMIN — ALBUMIN (HUMAN) 12.5 G: 12.5 INJECTION, SOLUTION INTRAVENOUS at 16:28

## 2018-01-01 RX ADMIN — SODIUM CHLORIDE 500 ML: 9 INJECTION, SOLUTION INTRAVENOUS at 21:44

## 2018-01-01 RX ADMIN — ENOXAPARIN SODIUM 80 MG: 100 INJECTION SUBCUTANEOUS at 00:03

## 2018-01-01 RX ADMIN — METOPROLOL TARTRATE 5 MG: 5 INJECTION, SOLUTION INTRAVENOUS at 06:46

## 2018-01-01 RX ADMIN — METOPROLOL TARTRATE 5 MG: 1 INJECTION, SOLUTION INTRAVENOUS at 06:46

## 2018-01-01 RX ADMIN — ENOXAPARIN SODIUM 90 MG: 100 INJECTION SUBCUTANEOUS at 11:19

## 2018-01-01 RX ADMIN — TOBRAMYCIN AND DEXAMETHASONE 1 DROP: 3; 1 SUSPENSION/ DROPS OPHTHALMIC at 14:18

## 2018-01-01 RX ADMIN — THIAMINE HYDROCHLORIDE 200 MG: 100 INJECTION, SOLUTION INTRAMUSCULAR; INTRAVENOUS at 08:29

## 2018-01-01 RX ADMIN — SODIUM CHLORIDE: 4.5 INJECTION, SOLUTION INTRAVENOUS at 09:37

## 2018-01-01 RX ADMIN — FENTANYL CITRATE 50 MCG/HR: 50 INJECTION, SOLUTION INTRAMUSCULAR; INTRAVENOUS at 19:58

## 2018-01-01 RX ADMIN — ENOXAPARIN SODIUM 90 MG: 100 INJECTION SUBCUTANEOUS at 12:54

## 2018-01-01 RX ADMIN — GANCICLOVIR SODIUM 200 MG: 500 INJECTION, POWDER, LYOPHILIZED, FOR SOLUTION INTRAVENOUS at 04:17

## 2018-01-01 RX ADMIN — SODIUM CHLORIDE 500 ML: 9 INJECTION, SOLUTION INTRAVENOUS at 15:56

## 2018-01-01 RX ADMIN — PROPOFOL 20 MG: 10 INJECTION, EMULSION INTRAVENOUS at 13:23

## 2018-01-01 RX ADMIN — PROPOFOL 25 MCG/KG/MIN: 10 INJECTION, EMULSION INTRAVENOUS at 05:11

## 2018-01-01 RX ADMIN — MEROPENEM 1 G: 1 INJECTION, POWDER, FOR SOLUTION INTRAVENOUS at 08:46

## 2018-01-01 RX ADMIN — ASCORBIC ACID, VITAMIN A PALMITATE, CHOLECALCIFEROL, THIAMINE HYDROCHLORIDE, RIBOFLAVIN-5 PHOSPHATE SODIUM, PYRIDOXINE HYDROCHLORIDE, NIACINAMIDE, DEXPANTHENOL, ALPHA-TOCOPHEROL ACETATE, VITAMIN K1, FOLIC ACID, BIOTIN, CYANOCOBALAMIN: 200; 3300; 200; 6; 3.6; 6; 40; 15; 10; 150; 600; 60; 5 INJECTION, SOLUTION INTRAVENOUS at 18:04

## 2018-01-01 RX ADMIN — FAMOTIDINE 20 MG: 10 INJECTION, SOLUTION INTRAVENOUS at 09:37

## 2018-01-01 RX ADMIN — CALCIUM GLUCONATE: 98 INJECTION, SOLUTION INTRAVENOUS at 17:32

## 2018-01-01 RX ADMIN — HYDRALAZINE HYDROCHLORIDE 10 MG: 20 INJECTION INTRAMUSCULAR; INTRAVENOUS at 08:08

## 2018-01-01 RX ADMIN — DOCUSATE SODIUM 100 MG: 100 CAPSULE, LIQUID FILLED ORAL at 20:34

## 2018-01-01 RX ADMIN — POTASSIUM CHLORIDE 20 MEQ: 29.8 INJECTION, SOLUTION INTRAVENOUS at 17:20

## 2018-01-01 RX ADMIN — PANTOPRAZOLE SODIUM 40 MG: 40 INJECTION, POWDER, FOR SOLUTION INTRAVENOUS at 20:36

## 2018-01-01 RX ADMIN — PROPOFOL 25 MCG/KG/MIN: 10 INJECTION, EMULSION INTRAVENOUS at 05:39

## 2018-01-01 RX ADMIN — ALBUMIN (HUMAN) 12.5 G: 12.5 INJECTION, SOLUTION INTRAVENOUS at 17:34

## 2018-01-01 RX ADMIN — TOBRAMYCIN AND DEXAMETHASONE 1 DROP: 3; 1 SUSPENSION/ DROPS OPHTHALMIC at 21:24

## 2018-01-01 RX ADMIN — Medication 10 ML: at 09:08

## 2018-01-01 RX ADMIN — Medication 2 UNITS: at 17:36

## 2018-01-01 RX ADMIN — HYDROCORTISONE SODIUM SUCCINATE 50 MG: 100 INJECTION, POWDER, FOR SOLUTION INTRAMUSCULAR; INTRAVENOUS at 02:34

## 2018-01-01 RX ADMIN — TOBRAMYCIN AND DEXAMETHASONE 1 DROP: 3; 1 SUSPENSION/ DROPS OPHTHALMIC at 14:23

## 2018-01-01 RX ADMIN — Medication 2 UNITS: at 23:46

## 2018-01-01 RX ADMIN — HYDROCORTISONE SODIUM SUCCINATE 50 MG: 100 INJECTION, POWDER, FOR SOLUTION INTRAMUSCULAR; INTRAVENOUS at 17:03

## 2018-01-01 RX ADMIN — IPRATROPIUM BROMIDE AND ALBUTEROL SULFATE 1 AMPULE: .5; 3 SOLUTION RESPIRATORY (INHALATION) at 21:15

## 2018-01-01 RX ADMIN — IPRATROPIUM BROMIDE AND ALBUTEROL SULFATE 1 AMPULE: .5; 3 SOLUTION RESPIRATORY (INHALATION) at 08:42

## 2018-01-01 RX ADMIN — Medication 10 ML: at 20:44

## 2018-01-01 RX ADMIN — MEROPENEM 1 G: 1 INJECTION, POWDER, FOR SOLUTION INTRAVENOUS at 02:32

## 2018-01-01 RX ADMIN — GANCICLOVIR SODIUM 200 MG: 500 INJECTION, POWDER, LYOPHILIZED, FOR SOLUTION INTRAVENOUS at 01:16

## 2018-01-01 RX ADMIN — ASCORBIC ACID 1500 MG: 500 INJECTION INTRAVENOUS at 03:44

## 2018-01-01 RX ADMIN — POTASSIUM CHLORIDE 40 MEQ: 20 TABLET, EXTENDED RELEASE ORAL at 20:15

## 2018-01-01 RX ADMIN — MEROPENEM 1 G: 1 INJECTION, POWDER, FOR SOLUTION INTRAVENOUS at 03:48

## 2018-01-01 RX ADMIN — PROPOFOL 20 MG: 10 INJECTION, EMULSION INTRAVENOUS at 13:20

## 2018-01-01 RX ADMIN — FLUCONAZOLE, SODIUM CHLORIDE 400 MG: 2 INJECTION INTRAVENOUS at 11:29

## 2018-01-01 RX ADMIN — Medication 10 ML: at 06:20

## 2018-01-01 RX ADMIN — Medication 15 ML: at 09:32

## 2018-01-01 RX ADMIN — DILTIAZEM HYDROCHLORIDE 15 MG: 5 INJECTION INTRAVENOUS at 08:10

## 2018-01-01 RX ADMIN — POTASSIUM CHLORIDE 20 MEQ: 29.8 INJECTION, SOLUTION INTRAVENOUS at 09:22

## 2018-01-01 RX ADMIN — PREDNISONE 10 MG: 10 TABLET ORAL at 09:08

## 2018-01-01 RX ADMIN — TOBRAMYCIN AND DEXAMETHASONE 1 DROP: 3; 1 SUSPENSION/ DROPS OPHTHALMIC at 08:52

## 2018-01-01 RX ADMIN — Medication 2 UNITS: at 00:39

## 2018-01-01 RX ADMIN — ENOXAPARIN SODIUM 90 MG: 100 INJECTION SUBCUTANEOUS at 23:10

## 2018-01-01 RX ADMIN — MEROPENEM 1 G: 1 INJECTION, POWDER, FOR SOLUTION INTRAVENOUS at 17:49

## 2018-01-01 RX ADMIN — POTASSIUM CHLORIDE 20 MEQ: 29.8 INJECTION, SOLUTION INTRAVENOUS at 16:23

## 2018-01-01 RX ADMIN — ASCORBIC ACID, VITAMIN A PALMITATE, CHOLECALCIFEROL, THIAMINE HYDROCHLORIDE, RIBOFLAVIN-5 PHOSPHATE SODIUM, PYRIDOXINE HYDROCHLORIDE, NIACINAMIDE, DEXPANTHENOL, ALPHA-TOCOPHEROL ACETATE, VITAMIN K1, FOLIC ACID, BIOTIN, CYANOCOBALAMIN: 200; 3300; 200; 6; 3.6; 6; 40; 15; 10; 150; 600; 60; 5 INJECTION, SOLUTION INTRAVENOUS at 18:05

## 2018-01-01 RX ADMIN — MEROPENEM 1 G: 1 INJECTION, POWDER, FOR SOLUTION INTRAVENOUS at 09:29

## 2018-01-01 RX ADMIN — PANTOPRAZOLE SODIUM 40 MG: 40 INJECTION, POWDER, FOR SOLUTION INTRAVENOUS at 20:18

## 2018-01-01 RX ADMIN — MEROPENEM 1 G: 1 INJECTION, POWDER, FOR SOLUTION INTRAVENOUS at 23:50

## 2018-01-01 RX ADMIN — POTASSIUM CHLORIDE 20 MEQ: 29.8 INJECTION, SOLUTION INTRAVENOUS at 02:42

## 2018-01-01 RX ADMIN — FENTANYL CITRATE 25 MCG: 50 INJECTION, SOLUTION INTRAMUSCULAR; INTRAVENOUS at 21:21

## 2018-01-01 RX ADMIN — ACETAMINOPHEN 650 MG: 650 SUPPOSITORY RECTAL at 13:04

## 2018-01-01 RX ADMIN — IMMUNE GLOBULIN (HUMAN) 10 G: 10 INJECTION INTRAVENOUS; SUBCUTANEOUS at 13:51

## 2018-01-01 RX ADMIN — Medication 10 ML: at 05:41

## 2018-01-01 RX ADMIN — MEROPENEM 1 G: 1 INJECTION, POWDER, FOR SOLUTION INTRAVENOUS at 00:41

## 2018-01-01 RX ADMIN — TOBRAMYCIN AND DEXAMETHASONE 1 DROP: 3; 1 SUSPENSION/ DROPS OPHTHALMIC at 16:22

## 2018-01-01 RX ADMIN — PANTOPRAZOLE SODIUM 40 MG: 40 INJECTION, POWDER, FOR SOLUTION INTRAVENOUS at 20:00

## 2018-01-01 RX ADMIN — Medication: at 09:16

## 2018-01-01 RX ADMIN — Medication 10 ML: at 21:24

## 2018-01-01 RX ADMIN — Medication 10 ML: at 21:31

## 2018-01-01 RX ADMIN — ACETAMINOPHEN 650 MG: 650 SUPPOSITORY RECTAL at 16:10

## 2018-01-01 RX ADMIN — MICAFUNGIN SODIUM 150 MG: 20 INJECTION, POWDER, LYOPHILIZED, FOR SOLUTION INTRAVENOUS at 08:46

## 2018-01-01 RX ADMIN — SODIUM CHLORIDE: 4.5 INJECTION, SOLUTION INTRAVENOUS at 16:26

## 2018-01-01 RX ADMIN — POTASSIUM CHLORIDE 20 MEQ: 29.8 INJECTION, SOLUTION INTRAVENOUS at 07:41

## 2018-01-01 RX ADMIN — Medication 10 ML: at 20:00

## 2018-01-01 RX ADMIN — ASCORBIC ACID 1500 MG: 500 INJECTION INTRAVENOUS at 16:49

## 2018-01-01 RX ADMIN — Medication 10 ML: at 08:25

## 2018-01-01 RX ADMIN — METOPROLOL TARTRATE 5 MG: 5 INJECTION, SOLUTION INTRAVENOUS at 07:09

## 2018-01-01 RX ADMIN — FUROSEMIDE: 10 INJECTION, SOLUTION INTRAMUSCULAR; INTRAVENOUS at 01:12

## 2018-01-01 RX ADMIN — FENTANYL CITRATE 100 MCG: 50 INJECTION INTRAMUSCULAR; INTRAVENOUS at 13:11

## 2018-01-01 RX ADMIN — Medication 10 ML: at 00:31

## 2018-01-01 RX ADMIN — Medication 2 UNITS: at 00:04

## 2018-01-01 RX ADMIN — Medication 2 UNITS: at 07:33

## 2018-01-01 RX ADMIN — PANTOPRAZOLE SODIUM 40 MG: 40 INJECTION, POWDER, FOR SOLUTION INTRAVENOUS at 07:59

## 2018-01-01 RX ADMIN — DOCUSATE SODIUM 100 MG: 50 LIQUID ORAL at 20:03

## 2018-01-01 RX ADMIN — PIPERACILLIN SODIUM AND TAZOBACTAM SODIUM 3.38 G: 3; .375 INJECTION, POWDER, LYOPHILIZED, FOR SOLUTION INTRAVENOUS at 01:21

## 2018-01-01 RX ADMIN — IPRATROPIUM BROMIDE AND ALBUTEROL SULFATE 1 AMPULE: .5; 3 SOLUTION RESPIRATORY (INHALATION) at 07:43

## 2018-01-01 RX ADMIN — TOBRAMYCIN AND DEXAMETHASONE 1 DROP: 3; 1 SUSPENSION/ DROPS OPHTHALMIC at 10:02

## 2018-01-01 RX ADMIN — IMMUNE GLOBULIN (HUMAN) 20 G: 10 INJECTION INTRAVENOUS; SUBCUTANEOUS at 11:44

## 2018-01-01 RX ADMIN — ENOXAPARIN SODIUM 80 MG: 100 INJECTION SUBCUTANEOUS at 23:50

## 2018-01-01 RX ADMIN — MICAFUNGIN SODIUM 150 MG: 20 INJECTION, POWDER, LYOPHILIZED, FOR SOLUTION INTRAVENOUS at 09:16

## 2018-01-01 RX ADMIN — HYDROCORTISONE SODIUM SUCCINATE 50 MG: 100 INJECTION, POWDER, FOR SOLUTION INTRAMUSCULAR; INTRAVENOUS at 10:39

## 2018-01-01 RX ADMIN — ASCORBIC ACID 1500 MG: 500 INJECTION INTRAVENOUS at 21:40

## 2018-01-01 RX ADMIN — TOBRAMYCIN AND DEXAMETHASONE 1 DROP: 3; 1 SUSPENSION/ DROPS OPHTHALMIC at 08:50

## 2018-01-01 RX ADMIN — PANTOPRAZOLE SODIUM 40 MG: 40 INJECTION, POWDER, FOR SOLUTION INTRAVENOUS at 08:12

## 2018-01-01 RX ADMIN — ENOXAPARIN SODIUM 90 MG: 100 INJECTION SUBCUTANEOUS at 22:52

## 2018-01-01 RX ADMIN — METOPROLOL TARTRATE 5 MG: 1 INJECTION, SOLUTION INTRAVENOUS at 23:01

## 2018-01-01 RX ADMIN — GANCICLOVIR SODIUM 200 MG: 500 INJECTION, POWDER, LYOPHILIZED, FOR SOLUTION INTRAVENOUS at 03:06

## 2018-01-01 RX ADMIN — PREDNISONE 10 MG: 10 TABLET ORAL at 08:30

## 2018-01-01 RX ADMIN — THIAMINE HYDROCHLORIDE 200 MG: 100 INJECTION, SOLUTION INTRAMUSCULAR; INTRAVENOUS at 08:50

## 2018-01-01 RX ADMIN — Medication 10 ML: at 20:18

## 2018-01-01 RX ADMIN — TOBRAMYCIN AND DEXAMETHASONE 1 DROP: 3; 1 SUSPENSION/ DROPS OPHTHALMIC at 14:30

## 2018-01-01 RX ADMIN — HYDROCORTISONE SODIUM SUCCINATE 50 MG: 100 INJECTION, POWDER, FOR SOLUTION INTRAMUSCULAR; INTRAVENOUS at 18:16

## 2018-01-01 RX ADMIN — DOCUSATE SODIUM 100 MG: 50 LIQUID ORAL at 10:10

## 2018-01-01 RX ADMIN — MORPHINE SULFATE 4 MG: 2 INJECTION, SOLUTION INTRAMUSCULAR; INTRAVENOUS at 07:27

## 2018-01-01 RX ADMIN — DEXMEDETOMIDINE 0.4 MCG/KG/HR: 100 INJECTION, SOLUTION, CONCENTRATE INTRAVENOUS at 17:38

## 2018-01-01 RX ADMIN — MICAFUNGIN SODIUM 150 MG: 20 INJECTION, POWDER, LYOPHILIZED, FOR SOLUTION INTRAVENOUS at 12:53

## 2018-01-01 RX ADMIN — PANTOPRAZOLE SODIUM 40 MG: 40 INJECTION, POWDER, FOR SOLUTION INTRAVENOUS at 21:52

## 2018-01-01 RX ADMIN — GANCICLOVIR SODIUM 200 MG: 500 INJECTION, POWDER, LYOPHILIZED, FOR SOLUTION INTRAVENOUS at 01:18

## 2018-01-01 RX ADMIN — DEXMEDETOMIDINE 0.6 MCG/KG/HR: 100 INJECTION, SOLUTION, CONCENTRATE INTRAVENOUS at 04:15

## 2018-01-01 RX ADMIN — PIPERACILLIN SODIUM AND TAZOBACTAM SODIUM 3.38 G: 3; .375 INJECTION, POWDER, LYOPHILIZED, FOR SOLUTION INTRAVENOUS at 16:44

## 2018-01-01 RX ADMIN — PREDNISONE 10 MG: 10 TABLET ORAL at 08:25

## 2018-01-01 RX ADMIN — TOBRAMYCIN AND DEXAMETHASONE 1 DROP: 3; 1 SUSPENSION/ DROPS OPHTHALMIC at 08:46

## 2018-01-01 RX ADMIN — MEROPENEM 1 G: 1 INJECTION, POWDER, FOR SOLUTION INTRAVENOUS at 15:54

## 2018-01-01 RX ADMIN — LORAZEPAM 1 MG: 2 INJECTION INTRAMUSCULAR; INTRAVENOUS at 14:33

## 2018-01-01 RX ADMIN — MEROPENEM 1 G: 1 INJECTION, POWDER, FOR SOLUTION INTRAVENOUS at 16:16

## 2018-01-01 RX ADMIN — MEROPENEM 1 G: 1 INJECTION, POWDER, FOR SOLUTION INTRAVENOUS at 10:49

## 2018-01-01 RX ADMIN — MICAFUNGIN SODIUM 150 MG: 20 INJECTION, POWDER, LYOPHILIZED, FOR SOLUTION INTRAVENOUS at 08:36

## 2018-01-01 RX ADMIN — METOPROLOL TARTRATE 5 MG: 5 INJECTION, SOLUTION INTRAVENOUS at 09:10

## 2018-01-01 RX ADMIN — FUROSEMIDE 20 MG: 10 INJECTION, SOLUTION INTRAMUSCULAR; INTRAVENOUS at 18:09

## 2018-01-01 RX ADMIN — LORAZEPAM 1 MG: 2 INJECTION INTRAMUSCULAR; INTRAVENOUS at 12:22

## 2018-01-01 RX ADMIN — MEROPENEM 1 G: 1 INJECTION, POWDER, FOR SOLUTION INTRAVENOUS at 10:54

## 2018-01-01 RX ADMIN — ACETAMINOPHEN 650 MG: 650 SUPPOSITORY RECTAL at 08:09

## 2018-01-01 RX ADMIN — Medication 10 ML: at 20:34

## 2018-01-01 RX ADMIN — FENTANYL CITRATE 50 MCG: 50 INJECTION, SOLUTION INTRAMUSCULAR; INTRAVENOUS at 15:36

## 2018-01-01 RX ADMIN — PANTOPRAZOLE SODIUM 40 MG: 40 INJECTION, POWDER, FOR SOLUTION INTRAVENOUS at 20:01

## 2018-01-01 RX ADMIN — MORPHINE SULFATE 2 MG: 2 INJECTION, SOLUTION INTRAMUSCULAR; INTRAVENOUS at 09:30

## 2018-01-01 RX ADMIN — POTASSIUM PHOSPHATE, MONOBASIC AND POTASSIUM PHOSPHATE, DIBASIC: 224; 236 INJECTION, SOLUTION INTRAVENOUS at 18:22

## 2018-01-01 RX ADMIN — Medication 2 UNITS: at 12:31

## 2018-01-01 RX ADMIN — FLUCONAZOLE, SODIUM CHLORIDE 400 MG: 2 INJECTION INTRAVENOUS at 10:41

## 2018-01-01 RX ADMIN — ONDANSETRON 4 MG: 2 INJECTION INTRAMUSCULAR; INTRAVENOUS at 16:39

## 2018-01-01 RX ADMIN — IPRATROPIUM BROMIDE AND ALBUTEROL SULFATE 1 AMPULE: .5; 3 SOLUTION RESPIRATORY (INHALATION) at 16:03

## 2018-01-01 RX ADMIN — PHENYLEPHRINE HYDROCHLORIDE 200 MCG: 10 INJECTION INTRAVENOUS at 14:00

## 2018-01-01 RX ADMIN — MEROPENEM 1 G: 1 INJECTION, POWDER, FOR SOLUTION INTRAVENOUS at 00:08

## 2018-01-01 RX ADMIN — Medication 2 UNITS: at 23:59

## 2018-01-01 RX ADMIN — Medication 2 UNITS: at 18:21

## 2018-01-01 RX ADMIN — Medication 10 ML: at 07:52

## 2018-01-01 RX ADMIN — ENOXAPARIN SODIUM 80 MG: 100 INJECTION SUBCUTANEOUS at 10:24

## 2018-01-01 RX ADMIN — Medication 10 MCG/MIN: at 09:12

## 2018-01-01 RX ADMIN — SULFAMETHOXAZOLE AND TRIMETHOPRIM 433.6 MG: 80; 16 INJECTION, SOLUTION, CONCENTRATE INTRAVENOUS at 13:41

## 2018-01-01 RX ADMIN — ASCORBIC ACID 1500 MG: 500 INJECTION INTRAVENOUS at 23:03

## 2018-01-01 RX ADMIN — Medication 10 ML: at 01:40

## 2018-01-01 RX ADMIN — MEROPENEM 1 G: 1 INJECTION, POWDER, FOR SOLUTION INTRAVENOUS at 09:50

## 2018-01-01 RX ADMIN — Medication 2 UNITS: at 05:57

## 2018-01-01 RX ADMIN — POTASSIUM BICARBONATE 40 MEQ: 782 TABLET, EFFERVESCENT ORAL at 11:53

## 2018-01-01 RX ADMIN — FAMOTIDINE 20 MG: 10 INJECTION, SOLUTION INTRAVENOUS at 10:00

## 2018-01-01 RX ADMIN — ASCORBIC ACID 1500 MG: 500 INJECTION INTRAVENOUS at 16:26

## 2018-01-01 RX ADMIN — LORAZEPAM: 2 INJECTION INTRAMUSCULAR; INTRAVENOUS at 01:15

## 2018-01-01 RX ADMIN — ACETAMINOPHEN 650 MG: 650 SOLUTION ORAL at 20:41

## 2018-01-01 RX ADMIN — PROPOFOL 50 MG: 10 INJECTION, EMULSION INTRAVENOUS at 11:25

## 2018-01-01 RX ADMIN — Medication 1 UNITS: at 12:05

## 2018-01-01 RX ADMIN — PANTOPRAZOLE SODIUM 40 MG: 40 INJECTION, POWDER, FOR SOLUTION INTRAVENOUS at 21:37

## 2018-01-01 RX ADMIN — Medication 2 UNITS: at 01:29

## 2018-01-01 RX ADMIN — Medication 10 ML: at 08:20

## 2018-01-01 RX ADMIN — MEROPENEM 1 G: 1 INJECTION, POWDER, FOR SOLUTION INTRAVENOUS at 15:18

## 2018-01-01 RX ADMIN — MEROPENEM 1 G: 1 INJECTION, POWDER, FOR SOLUTION INTRAVENOUS at 16:14

## 2018-01-01 RX ADMIN — FLUCONAZOLE, SODIUM CHLORIDE 400 MG: 2 INJECTION INTRAVENOUS at 10:12

## 2018-01-01 RX ADMIN — CALCIUM GLUCONATE: 98 INJECTION, SOLUTION INTRAVENOUS at 17:54

## 2018-01-01 RX ADMIN — MEROPENEM 1 G: 1 INJECTION, POWDER, FOR SOLUTION INTRAVENOUS at 09:23

## 2018-01-01 RX ADMIN — SULFAMETHOXAZOLE AND TRIMETHOPRIM 433.6 MG: 80; 16 INJECTION, SOLUTION, CONCENTRATE INTRAVENOUS at 08:57

## 2018-01-01 RX ADMIN — Medication 10 ML: at 00:30

## 2018-01-01 RX ADMIN — CEFAZOLIN 2000 MG: 1 INJECTION, POWDER, FOR SOLUTION INTRAMUSCULAR; INTRAVENOUS; PARENTERAL at 13:03

## 2018-01-01 RX ADMIN — PANTOPRAZOLE SODIUM 40 MG: 40 INJECTION, POWDER, FOR SOLUTION INTRAVENOUS at 08:04

## 2018-01-01 RX ADMIN — PREDNISONE 5 MG: 5 TABLET ORAL at 08:29

## 2018-01-01 RX ADMIN — TOBRAMYCIN AND DEXAMETHASONE 1 DROP: 3; 1 SUSPENSION/ DROPS OPHTHALMIC at 09:26

## 2018-01-01 RX ADMIN — Medication 2 UNITS: at 06:05

## 2018-01-01 RX ADMIN — MORPHINE SULFATE 4 MG: 4 INJECTION INTRAVENOUS at 01:50

## 2018-01-01 RX ADMIN — MORPHINE SULFATE 4 MG: 4 INJECTION, SOLUTION INTRAMUSCULAR; INTRAVENOUS at 01:50

## 2018-01-01 RX ADMIN — ACETAMINOPHEN 650 MG: 650 SUPPOSITORY RECTAL at 12:53

## 2018-01-01 RX ADMIN — HYDROCORTISONE SODIUM SUCCINATE 50 MG: 100 INJECTION, POWDER, FOR SOLUTION INTRAMUSCULAR; INTRAVENOUS at 03:43

## 2018-01-01 RX ADMIN — PIPERACILLIN SODIUM AND TAZOBACTAM SODIUM 3.38 G: 3; .375 INJECTION, POWDER, LYOPHILIZED, FOR SOLUTION INTRAVENOUS at 08:57

## 2018-01-01 RX ADMIN — TOBRAMYCIN AND DEXAMETHASONE 1 DROP: 3; 1 SUSPENSION/ DROPS OPHTHALMIC at 15:12

## 2018-01-01 RX ADMIN — PANTOPRAZOLE SODIUM 40 MG: 40 INJECTION, POWDER, FOR SOLUTION INTRAVENOUS at 08:17

## 2018-01-01 RX ADMIN — SODIUM CHLORIDE: 9 INJECTION, SOLUTION INTRAVENOUS at 05:53

## 2018-01-01 RX ADMIN — POTASSIUM CHLORIDE 20 MEQ: 400 INJECTION, SOLUTION INTRAVENOUS at 19:59

## 2018-01-01 RX ADMIN — PIPERACILLIN SODIUM,TAZOBACTAM SODIUM 3.38 G: 3; .375 INJECTION, POWDER, FOR SOLUTION INTRAVENOUS at 01:43

## 2018-01-01 RX ADMIN — IMMUNE GLOBULIN (HUMAN) 10 G: 10 INJECTION INTRAVENOUS; SUBCUTANEOUS at 14:41

## 2018-01-01 RX ADMIN — IPRATROPIUM BROMIDE AND ALBUTEROL SULFATE 1 AMPULE: .5; 3 SOLUTION RESPIRATORY (INHALATION) at 05:22

## 2018-01-01 RX ADMIN — IPRATROPIUM BROMIDE AND ALBUTEROL SULFATE 1 AMPULE: .5; 3 SOLUTION RESPIRATORY (INHALATION) at 07:55

## 2018-01-01 RX ADMIN — HYDROCORTISONE SODIUM SUCCINATE 50 MG: 100 INJECTION, POWDER, FOR SOLUTION INTRAMUSCULAR; INTRAVENOUS at 18:32

## 2018-01-01 RX ADMIN — METOPROLOL TARTRATE 5 MG: 1 INJECTION, SOLUTION INTRAVENOUS at 05:41

## 2018-01-01 RX ADMIN — SODIUM CHLORIDE 500 ML: 9 INJECTION, SOLUTION INTRAVENOUS at 01:37

## 2018-01-01 RX ADMIN — ENOXAPARIN SODIUM 80 MG: 100 INJECTION SUBCUTANEOUS at 12:13

## 2018-01-01 RX ADMIN — METOPROLOL TARTRATE 5 MG: 1 INJECTION, SOLUTION INTRAVENOUS at 21:57

## 2018-01-01 RX ADMIN — IPRATROPIUM BROMIDE AND ALBUTEROL SULFATE 1 AMPULE: .5; 3 SOLUTION RESPIRATORY (INHALATION) at 21:34

## 2018-01-01 RX ADMIN — PANTOPRAZOLE SODIUM 40 MG: 40 INJECTION, POWDER, FOR SOLUTION INTRAVENOUS at 20:19

## 2018-01-01 RX ADMIN — POTASSIUM PHOSPHATE, MONOBASIC AND POTASSIUM PHOSPHATE, DIBASIC: 224; 236 INJECTION, SOLUTION INTRAVENOUS at 19:28

## 2018-01-01 RX ADMIN — ENOXAPARIN SODIUM 90 MG: 100 INJECTION SUBCUTANEOUS at 13:30

## 2018-01-01 RX ADMIN — Medication 10 ML: at 20:36

## 2018-01-01 RX ADMIN — THIAMINE HYDROCHLORIDE 200 MG: 100 INJECTION, SOLUTION INTRAMUSCULAR; INTRAVENOUS at 21:26

## 2018-01-01 RX ADMIN — AMLODIPINE BESYLATE 10 MG: 10 TABLET ORAL at 08:28

## 2018-01-01 RX ADMIN — ALTEPLASE 1 MG: 2.2 INJECTION, POWDER, LYOPHILIZED, FOR SOLUTION INTRAVENOUS at 09:22

## 2018-01-01 RX ADMIN — POTASSIUM CHLORIDE 20 MEQ: 29.8 INJECTION, SOLUTION INTRAVENOUS at 08:47

## 2018-01-01 RX ADMIN — ENOXAPARIN SODIUM 80 MG: 100 INJECTION SUBCUTANEOUS at 22:46

## 2018-01-01 RX ADMIN — PANTOPRAZOLE SODIUM 40 MG: 40 INJECTION, POWDER, FOR SOLUTION INTRAVENOUS at 20:44

## 2018-01-01 RX ADMIN — Medication 2 UNITS: at 08:01

## 2018-01-01 RX ADMIN — Medication 2 UNITS: at 17:23

## 2018-01-01 RX ADMIN — FENTANYL CITRATE 25 MCG: 50 INJECTION INTRAMUSCULAR; INTRAVENOUS at 17:02

## 2018-01-01 RX ADMIN — LORAZEPAM 1 MG: 2 INJECTION INTRAMUSCULAR; INTRAVENOUS at 03:09

## 2018-01-01 RX ADMIN — Medication 10 ML: at 02:34

## 2018-01-01 RX ADMIN — GANCICLOVIR SODIUM 200 MG: 500 INJECTION, POWDER, LYOPHILIZED, FOR SOLUTION INTRAVENOUS at 17:52

## 2018-01-01 RX ADMIN — Medication 4 MCG/MIN: at 09:27

## 2018-01-01 RX ADMIN — DILTIAZEM HYDROCHLORIDE 10 MG/HR: 5 INJECTION INTRAVENOUS at 05:53

## 2018-01-01 RX ADMIN — BENZOCAINE: 11.4 AEROSOL, SPRAY TOPICAL at 21:30

## 2018-01-01 RX ADMIN — ACETAMINOPHEN 650 MG: 650 SUPPOSITORY RECTAL at 01:58

## 2018-01-01 RX ADMIN — FENTANYL CITRATE 100 MCG/HR: 50 INJECTION, SOLUTION INTRAMUSCULAR; INTRAVENOUS at 06:45

## 2018-01-01 RX ADMIN — PROPOFOL 10 MCG/KG/MIN: 10 INJECTION, EMULSION INTRAVENOUS at 14:26

## 2018-01-01 RX ADMIN — Medication 5 MCG/MIN: at 16:51

## 2018-01-01 RX ADMIN — MORPHINE SULFATE 2 MG: 2 INJECTION, SOLUTION INTRAMUSCULAR; INTRAVENOUS at 10:11

## 2018-01-01 RX ADMIN — PANTOPRAZOLE SODIUM 40 MG: 40 INJECTION, POWDER, FOR SOLUTION INTRAVENOUS at 08:25

## 2018-01-01 RX ADMIN — POTASSIUM CHLORIDE 20 MEQ: 29.8 INJECTION, SOLUTION INTRAVENOUS at 09:01

## 2018-01-01 RX ADMIN — Medication 10 ML: at 08:30

## 2018-01-01 RX ADMIN — FENTANYL CITRATE 25 MCG/HR: 50 INJECTION, SOLUTION INTRAMUSCULAR; INTRAVENOUS at 17:58

## 2018-01-01 RX ADMIN — MORPHINE SULFATE 30 MG: 1 INJECTION INTRAVENOUS at 08:46

## 2018-01-01 RX ADMIN — ENOXAPARIN SODIUM 90 MG: 100 INJECTION SUBCUTANEOUS at 22:38

## 2018-01-01 RX ADMIN — BACITRACIN ZINC 1 G: 500 OINTMENT TOPICAL at 16:09

## 2018-01-01 RX ADMIN — PANTOPRAZOLE SODIUM 40 MG: 40 INJECTION, POWDER, FOR SOLUTION INTRAVENOUS at 09:36

## 2018-01-01 RX ADMIN — IOPAMIDOL 80 ML: 755 INJECTION, SOLUTION INTRAVENOUS at 09:15

## 2018-01-01 RX ADMIN — FLUCONAZOLE, SODIUM CHLORIDE 400 MG: 2 INJECTION INTRAVENOUS at 10:28

## 2018-01-01 RX ADMIN — HYDROCORTISONE SODIUM SUCCINATE 50 MG: 100 INJECTION, POWDER, FOR SOLUTION INTRAMUSCULAR; INTRAVENOUS at 09:23

## 2018-01-01 RX ADMIN — ACETAMINOPHEN 650 MG: 650 SOLUTION ORAL at 08:00

## 2018-01-01 RX ADMIN — IPRATROPIUM BROMIDE AND ALBUTEROL SULFATE 1 AMPULE: .5; 3 SOLUTION RESPIRATORY (INHALATION) at 12:10

## 2018-01-01 RX ADMIN — Medication 10 ML: at 20:19

## 2018-01-01 RX ADMIN — FAMOTIDINE 20 MG: 10 INJECTION, SOLUTION INTRAVENOUS at 10:02

## 2018-01-01 RX ADMIN — IMMUNE GLOBULIN (HUMAN) 20 G: 10 INJECTION INTRAVENOUS; SUBCUTANEOUS at 12:19

## 2018-01-01 RX ADMIN — POTASSIUM PHOSPHATE, MONOBASIC AND POTASSIUM PHOSPHATE, DIBASIC 13 MMOL: 224; 236 INJECTION, SOLUTION INTRAVENOUS at 06:19

## 2018-01-01 RX ADMIN — PROPOFOL 60 MG: 10 INJECTION, EMULSION INTRAVENOUS at 13:16

## 2018-01-01 RX ADMIN — MORPHINE SULFATE 30 MG: 1 INJECTION INTRAVENOUS at 16:44

## 2018-01-01 RX ADMIN — SUCCINYLCHOLINE CHLORIDE 100 MG: 20 INJECTION, SOLUTION INTRAMUSCULAR; INTRAVENOUS at 12:56

## 2018-01-01 RX ADMIN — POTASSIUM CHLORIDE 20 MEQ: 29.8 INJECTION, SOLUTION INTRAVENOUS at 07:00

## 2018-01-01 RX ADMIN — DEXMEDETOMIDINE 0.2 MCG/KG/HR: 100 INJECTION, SOLUTION, CONCENTRATE INTRAVENOUS at 07:08

## 2018-01-01 RX ADMIN — SULFAMETHOXAZOLE AND TRIMETHOPRIM 433.6 MG: 80; 16 INJECTION, SOLUTION, CONCENTRATE INTRAVENOUS at 05:30

## 2018-01-01 RX ADMIN — DOCUSATE SODIUM 100 MG: 100 CAPSULE, LIQUID FILLED ORAL at 08:25

## 2018-01-01 RX ADMIN — DOCUSATE SODIUM 100 MG: 100 CAPSULE, LIQUID FILLED ORAL at 09:44

## 2018-01-01 RX ADMIN — Medication 2 UNITS: at 18:15

## 2018-01-01 RX ADMIN — ACETAMINOPHEN 650 MG: 325 TABLET ORAL at 04:14

## 2018-01-01 RX ADMIN — ASCORBIC ACID, VITAMIN A PALMITATE, CHOLECALCIFEROL, THIAMINE HYDROCHLORIDE, RIBOFLAVIN-5 PHOSPHATE SODIUM, PYRIDOXINE HYDROCHLORIDE, NIACINAMIDE, DEXPANTHENOL, ALPHA-TOCOPHEROL ACETATE, VITAMIN K1, FOLIC ACID, BIOTIN, CYANOCOBALAMIN: 200; 3300; 200; 6; 3.6; 6; 40; 15; 10; 150; 600; 60; 5 INJECTION, SOLUTION INTRAVENOUS at 18:18

## 2018-01-01 RX ADMIN — HYDROCORTISONE SODIUM SUCCINATE 50 MG: 100 INJECTION, POWDER, FOR SOLUTION INTRAMUSCULAR; INTRAVENOUS at 08:52

## 2018-01-01 RX ADMIN — Medication: at 19:44

## 2018-01-01 RX ADMIN — Medication 10 ML: at 08:41

## 2018-01-01 RX ADMIN — ENOXAPARIN SODIUM 90 MG: 100 INJECTION SUBCUTANEOUS at 23:41

## 2018-01-01 RX ADMIN — ASCORBIC ACID 1500 MG: 500 INJECTION INTRAVENOUS at 17:44

## 2018-01-01 RX ADMIN — SODIUM CHLORIDE, POTASSIUM CHLORIDE, SODIUM LACTATE AND CALCIUM CHLORIDE: 600; 310; 30; 20 INJECTION, SOLUTION INTRAVENOUS at 13:43

## 2018-01-01 RX ADMIN — PIPERACILLIN SODIUM AND TAZOBACTAM SODIUM 3.38 G: 3; .375 INJECTION, POWDER, LYOPHILIZED, FOR SOLUTION INTRAVENOUS at 18:16

## 2018-01-01 RX ADMIN — SODIUM CHLORIDE, POTASSIUM CHLORIDE, SODIUM LACTATE AND CALCIUM CHLORIDE: 600; 310; 30; 20 INJECTION, SOLUTION INTRAVENOUS at 06:29

## 2018-01-01 RX ADMIN — Medication 10 ML: at 21:38

## 2018-01-01 RX ADMIN — POTASSIUM PHOSPHATE, MONOBASIC AND POTASSIUM PHOSPHATE, DIBASIC: 224; 236 INJECTION, SOLUTION INTRAVENOUS at 18:04

## 2018-01-01 RX ADMIN — Medication 10 ML: at 00:32

## 2018-01-01 RX ADMIN — HYDROCORTISONE SODIUM SUCCINATE 50 MG: 100 INJECTION, POWDER, FOR SOLUTION INTRAMUSCULAR; INTRAVENOUS at 08:36

## 2018-01-01 RX ADMIN — MEROPENEM 1 G: 1 INJECTION, POWDER, FOR SOLUTION INTRAVENOUS at 02:42

## 2018-01-01 RX ADMIN — ASCORBIC ACID 1500 MG: 500 INJECTION INTRAVENOUS at 11:27

## 2018-01-01 RX ADMIN — PIPERACILLIN SODIUM AND TAZOBACTAM SODIUM 3.38 G: 3; .375 INJECTION, POWDER, LYOPHILIZED, FOR SOLUTION INTRAVENOUS at 08:20

## 2018-01-01 RX ADMIN — POTASSIUM PHOSPHATE, MONOBASIC AND POTASSIUM PHOSPHATE, DIBASIC 13 MMOL: 224; 236 INJECTION, SOLUTION INTRAVENOUS at 08:48

## 2018-01-01 RX ADMIN — Medication 15 ML: at 12:42

## 2018-01-01 RX ADMIN — DOCUSATE SODIUM 100 MG: 50 LIQUID ORAL at 21:20

## 2018-01-01 RX ADMIN — Medication 6 MG: at 03:16

## 2018-01-01 RX ADMIN — POTASSIUM CHLORIDE 20 MEQ: 29.8 INJECTION, SOLUTION INTRAVENOUS at 08:22

## 2018-01-01 RX ADMIN — FUROSEMIDE 20 MG: 10 INJECTION, SOLUTION INTRAMUSCULAR; INTRAVENOUS at 03:00

## 2018-01-01 RX ADMIN — IOPAMIDOL 150 ML: 612 INJECTION, SOLUTION INTRAVENOUS at 16:18

## 2018-01-01 RX ADMIN — Medication 10 ML: at 08:08

## 2018-01-01 RX ADMIN — TOBRAMYCIN AND DEXAMETHASONE 1 DROP: 3; 1 SUSPENSION/ DROPS OPHTHALMIC at 15:02

## 2018-01-01 RX ADMIN — LIDOCAINE HYDROCHLORIDE 25 MG: 20 INJECTION, SOLUTION INFILTRATION; PERINEURAL at 11:25

## 2018-01-01 RX ADMIN — POTASSIUM CHLORIDE 20 MEQ: 29.8 INJECTION, SOLUTION INTRAVENOUS at 01:38

## 2018-01-01 RX ADMIN — DEXMEDETOMIDINE 0.8 MCG/KG/HR: 100 INJECTION, SOLUTION, CONCENTRATE INTRAVENOUS at 02:38

## 2018-01-01 RX ADMIN — Medication 10 MCG/MIN: at 05:55

## 2018-01-01 RX ADMIN — THIAMINE HYDROCHLORIDE 200 MG: 100 INJECTION, SOLUTION INTRAMUSCULAR; INTRAVENOUS at 21:09

## 2018-01-01 RX ADMIN — PANTOPRAZOLE SODIUM 40 MG: 40 INJECTION, POWDER, FOR SOLUTION INTRAVENOUS at 09:32

## 2018-01-01 RX ADMIN — THIAMINE HYDROCHLORIDE 200 MG: 100 INJECTION, SOLUTION INTRAMUSCULAR; INTRAVENOUS at 09:32

## 2018-01-01 RX ADMIN — Medication 10 ML: at 08:53

## 2018-01-01 RX ADMIN — TOBRAMYCIN AND DEXAMETHASONE 1 DROP: 3; 1 SUSPENSION/ DROPS OPHTHALMIC at 14:03

## 2018-01-01 RX ADMIN — PANTOPRAZOLE SODIUM 40 MG: 40 INJECTION, POWDER, FOR SOLUTION INTRAVENOUS at 08:50

## 2018-01-01 RX ADMIN — PREDNISONE 10 MG: 10 TABLET ORAL at 12:44

## 2018-01-01 RX ADMIN — ENOXAPARIN SODIUM 90 MG: 100 INJECTION SUBCUTANEOUS at 11:34

## 2018-01-01 RX ADMIN — ASCORBIC ACID 1500 MG: 500 INJECTION INTRAVENOUS at 03:48

## 2018-01-01 RX ADMIN — DEXMEDETOMIDINE 0.6 MCG/KG/HR: 100 INJECTION, SOLUTION, CONCENTRATE INTRAVENOUS at 09:32

## 2018-01-01 RX ADMIN — POTASSIUM PHOSPHATE, MONOBASIC AND POTASSIUM PHOSPHATE, DIBASIC 13 MMOL: 224; 236 INJECTION, SOLUTION INTRAVENOUS at 14:06

## 2018-01-01 RX ADMIN — TOBRAMYCIN AND DEXAMETHASONE 1 DROP: 3; 1 SUSPENSION/ DROPS OPHTHALMIC at 09:50

## 2018-01-01 RX ADMIN — FENTANYL CITRATE 50 MCG/HR: 50 INJECTION, SOLUTION INTRAMUSCULAR; INTRAVENOUS at 01:40

## 2018-01-01 RX ADMIN — THIAMINE HYDROCHLORIDE 200 MG: 100 INJECTION, SOLUTION INTRAMUSCULAR; INTRAVENOUS at 21:01

## 2018-01-01 RX ADMIN — FENTANYL CITRATE 100 MCG: 50 INJECTION INTRAMUSCULAR; INTRAVENOUS at 12:17

## 2018-01-01 RX ADMIN — PANTOPRAZOLE SODIUM 40 MG: 40 TABLET, DELAYED RELEASE ORAL at 08:29

## 2018-01-01 RX ADMIN — PANTOPRAZOLE SODIUM 40 MG: 40 INJECTION, POWDER, FOR SOLUTION INTRAVENOUS at 08:52

## 2018-01-01 RX ADMIN — ACETAMINOPHEN 650 MG: 650 SOLUTION ORAL at 20:33

## 2018-01-01 RX ADMIN — POTASSIUM CHLORIDE 20 MEQ: 29.8 INJECTION, SOLUTION INTRAVENOUS at 06:18

## 2018-01-01 RX ADMIN — Medication 10 ML: at 09:55

## 2018-01-01 RX ADMIN — MEROPENEM 1 G: 1 INJECTION, POWDER, FOR SOLUTION INTRAVENOUS at 00:11

## 2018-01-01 RX ADMIN — FENTANYL CITRATE 75 MCG/HR: 50 INJECTION, SOLUTION INTRAMUSCULAR; INTRAVENOUS at 12:53

## 2018-01-01 RX ADMIN — Medication 10 ML: at 10:54

## 2018-01-01 RX ADMIN — ENOXAPARIN SODIUM 80 MG: 100 INJECTION SUBCUTANEOUS at 10:23

## 2018-01-01 RX ADMIN — FENTANYL CITRATE 50 MCG/HR: 50 INJECTION, SOLUTION INTRAMUSCULAR; INTRAVENOUS at 08:41

## 2018-01-01 RX ADMIN — FAMOTIDINE 20 MG: 10 INJECTION, SOLUTION INTRAVENOUS at 09:26

## 2018-01-01 RX ADMIN — FENTANYL CITRATE 50 MCG: 50 INJECTION INTRAMUSCULAR; INTRAVENOUS at 14:08

## 2018-01-01 RX ADMIN — PROPOFOL 30 MCG/KG/MIN: 10 INJECTION, EMULSION INTRAVENOUS at 17:48

## 2018-01-01 RX ADMIN — LEVOFLOXACIN 500 MG: 5 INJECTION, SOLUTION INTRAVENOUS at 07:47

## 2018-01-01 RX ADMIN — PROPOFOL 120 MG: 10 INJECTION, EMULSION INTRAVENOUS at 12:56

## 2018-01-01 RX ADMIN — Medication 10 ML: at 09:32

## 2018-01-01 RX ADMIN — TOBRAMYCIN AND DEXAMETHASONE 1 DROP: 3; 1 SUSPENSION/ DROPS OPHTHALMIC at 21:37

## 2018-01-01 RX ADMIN — ASCORBIC ACID 1500 MG: 500 INJECTION INTRAVENOUS at 13:32

## 2018-01-01 RX ADMIN — Medication 10 ML: at 20:15

## 2018-01-01 RX ADMIN — Medication 10 ML: at 09:37

## 2018-01-01 RX ADMIN — DIATRIZOATE MEGLUMINE AND DIATRIZOATE SODIUM 10 ML: 600; 100 SOLUTION ORAL; RECTAL at 10:37

## 2018-01-01 RX ADMIN — ENOXAPARIN SODIUM 90 MG: 100 INJECTION SUBCUTANEOUS at 23:22

## 2018-01-01 RX ADMIN — HYDROCORTISONE SODIUM SUCCINATE 50 MG: 100 INJECTION, POWDER, FOR SOLUTION INTRAMUSCULAR; INTRAVENOUS at 19:02

## 2018-01-01 RX ADMIN — TOBRAMYCIN AND DEXAMETHASONE 1 DROP: 3; 1 SUSPENSION/ DROPS OPHTHALMIC at 21:23

## 2018-01-01 RX ADMIN — PANTOPRAZOLE SODIUM 40 MG: 40 INJECTION, POWDER, FOR SOLUTION INTRAVENOUS at 21:20

## 2018-01-01 RX ADMIN — PIPERACILLIN SODIUM AND TAZOBACTAM SODIUM 3.38 G: 3; .375 INJECTION, POWDER, LYOPHILIZED, FOR SOLUTION INTRAVENOUS at 01:16

## 2018-01-01 RX ADMIN — SODIUM CHLORIDE: 4.5 INJECTION, SOLUTION INTRAVENOUS at 20:14

## 2018-01-01 RX ADMIN — MICAFUNGIN SODIUM 150 MG: 20 INJECTION, POWDER, LYOPHILIZED, FOR SOLUTION INTRAVENOUS at 09:49

## 2018-01-01 RX ADMIN — IMMUNE GLOBULIN (HUMAN) 10 G: 10 INJECTION INTRAVENOUS; SUBCUTANEOUS at 12:20

## 2018-01-01 RX ADMIN — Medication 2 UNITS: at 00:35

## 2018-01-01 RX ADMIN — Medication 10 ML: at 23:01

## 2018-01-01 RX ADMIN — FENTANYL CITRATE 25 MCG: 50 INJECTION, SOLUTION INTRAMUSCULAR; INTRAVENOUS at 15:24

## 2018-01-01 RX ADMIN — IPRATROPIUM BROMIDE AND ALBUTEROL SULFATE 1 AMPULE: .5; 3 SOLUTION RESPIRATORY (INHALATION) at 20:48

## 2018-01-01 RX ADMIN — AMLODIPINE BESYLATE 10 MG: 10 TABLET ORAL at 18:03

## 2018-01-01 RX ADMIN — MORPHINE SULFATE 2 MG: 2 INJECTION, SOLUTION INTRAMUSCULAR; INTRAVENOUS at 11:17

## 2018-01-01 RX ADMIN — DIGOXIN 125 MCG: 0.25 INJECTION INTRAMUSCULAR; INTRAVENOUS at 23:25

## 2018-01-01 RX ADMIN — TOBRAMYCIN AND DEXAMETHASONE 1 DROP: 3; 1 SUSPENSION/ DROPS OPHTHALMIC at 22:37

## 2018-01-01 RX ADMIN — ACETAMINOPHEN 650 MG: 650 SUPPOSITORY RECTAL at 04:23

## 2018-01-01 RX ADMIN — IPRATROPIUM BROMIDE AND ALBUTEROL SULFATE 1 AMPULE: .5; 3 SOLUTION RESPIRATORY (INHALATION) at 07:26

## 2018-01-01 RX ADMIN — PANTOPRAZOLE SODIUM 40 MG: 40 INJECTION, POWDER, FOR SOLUTION INTRAVENOUS at 09:00

## 2018-01-01 RX ADMIN — GANCICLOVIR SODIUM 200 MG: 500 INJECTION, POWDER, LYOPHILIZED, FOR SOLUTION INTRAVENOUS at 04:22

## 2018-01-01 RX ADMIN — ACETAMINOPHEN 650 MG: 650 SUPPOSITORY RECTAL at 01:06

## 2018-01-01 RX ADMIN — HYDROCORTISONE SODIUM SUCCINATE 50 MG: 100 INJECTION, POWDER, FOR SOLUTION INTRAMUSCULAR; INTRAVENOUS at 09:34

## 2018-01-01 RX ADMIN — ASCORBIC ACID 1500 MG: 500 INJECTION INTRAVENOUS at 22:25

## 2018-01-01 RX ADMIN — GANCICLOVIR SODIUM 200 MG: 500 INJECTION, POWDER, LYOPHILIZED, FOR SOLUTION INTRAVENOUS at 13:30

## 2018-01-01 RX ADMIN — ACETAMINOPHEN 650 MG: 650 SUPPOSITORY RECTAL at 23:57

## 2018-01-01 RX ADMIN — TIOTROPIUM BROMIDE 18 MCG: 18 CAPSULE ORAL; RESPIRATORY (INHALATION) at 07:57

## 2018-01-01 RX ADMIN — LIDOCAINE HYDROCHLORIDE 15 ML: 20 SOLUTION ORAL; TOPICAL at 19:17

## 2018-01-01 RX ADMIN — Medication 10 ML: at 00:08

## 2018-01-01 RX ADMIN — MIDAZOLAM 1 MG: 1 INJECTION INTRAMUSCULAR; INTRAVENOUS at 15:36

## 2018-01-01 RX ADMIN — Medication 2 UNITS: at 11:19

## 2018-01-01 RX ADMIN — Medication 10 ML: at 05:50

## 2018-01-01 RX ADMIN — PHENYLEPHRINE HYDROCHLORIDE 200 MCG: 10 INJECTION INTRAVENOUS at 13:52

## 2018-01-01 RX ADMIN — Medication 10 ML: at 01:17

## 2018-01-01 RX ADMIN — VANCOMYCIN HYDROCHLORIDE 1250 MG: 5 INJECTION, POWDER, LYOPHILIZED, FOR SOLUTION INTRAVENOUS at 06:17

## 2018-01-01 RX ADMIN — TOBRAMYCIN AND DEXAMETHASONE 1 DROP: 3; 1 SUSPENSION/ DROPS OPHTHALMIC at 21:19

## 2018-01-01 RX ADMIN — MEROPENEM 1 G: 1 INJECTION, POWDER, FOR SOLUTION INTRAVENOUS at 12:51

## 2018-01-01 RX ADMIN — HYDROCODONE BITARTRATE AND ACETAMINOPHEN 2 TABLET: 5; 325 TABLET ORAL at 08:28

## 2018-01-01 RX ADMIN — IMMUNE GLOBULIN (HUMAN) 10 G: 10 INJECTION INTRAVENOUS; SUBCUTANEOUS at 11:09

## 2018-01-01 RX ADMIN — TOBRAMYCIN AND DEXAMETHASONE 1 DROP: 3; 1 SUSPENSION/ DROPS OPHTHALMIC at 21:09

## 2018-01-01 RX ADMIN — ENOXAPARIN SODIUM 80 MG: 100 INJECTION SUBCUTANEOUS at 16:27

## 2018-01-01 RX ADMIN — PROPOFOL 30 MCG/KG/MIN: 10 INJECTION, EMULSION INTRAVENOUS at 05:57

## 2018-01-01 RX ADMIN — DEXMEDETOMIDINE 0.7 MCG/KG/HR: 100 INJECTION, SOLUTION, CONCENTRATE INTRAVENOUS at 19:25

## 2018-01-01 RX ADMIN — Medication 2 UNITS: at 18:06

## 2018-01-01 RX ADMIN — HYDROCORTISONE SODIUM SUCCINATE 50 MG: 100 INJECTION, POWDER, FOR SOLUTION INTRAMUSCULAR; INTRAVENOUS at 18:23

## 2018-01-01 RX ADMIN — PANTOPRAZOLE SODIUM 40 MG: 40 INJECTION, POWDER, FOR SOLUTION INTRAVENOUS at 20:43

## 2018-01-01 RX ADMIN — Medication 10 ML: at 01:44

## 2018-01-01 RX ADMIN — Medication 10 ML: at 09:50

## 2018-01-01 RX ADMIN — PHENYLEPHRINE HYDROCHLORIDE 100 MCG: 10 INJECTION INTRAVENOUS at 13:49

## 2018-01-01 RX ADMIN — POTASSIUM CHLORIDE 20 MEQ: 29.8 INJECTION, SOLUTION INTRAVENOUS at 07:56

## 2018-01-01 RX ADMIN — IMMUNE GLOBULIN (HUMAN) 20 G: 10 INJECTION INTRAVENOUS; SUBCUTANEOUS at 12:16

## 2018-01-01 RX ADMIN — DILTIAZEM HYDROCHLORIDE 5 MG/HR: 5 INJECTION INTRAVENOUS at 08:14

## 2018-01-01 RX ADMIN — PIPERACILLIN SODIUM AND TAZOBACTAM SODIUM 3.38 G: 3; .375 INJECTION, POWDER, LYOPHILIZED, FOR SOLUTION INTRAVENOUS at 16:42

## 2018-01-01 RX ADMIN — SODIUM CHLORIDE 500 ML: 9 INJECTION, SOLUTION INTRAVENOUS at 05:59

## 2018-01-01 RX ADMIN — THIAMINE HYDROCHLORIDE 200 MG: 100 INJECTION, SOLUTION INTRAMUSCULAR; INTRAVENOUS at 20:45

## 2018-01-01 RX ADMIN — IOPAMIDOL 80 ML: 755 INJECTION, SOLUTION INTRAVENOUS at 13:55

## 2018-01-01 RX ADMIN — THIAMINE HYDROCHLORIDE 200 MG: 100 INJECTION, SOLUTION INTRAMUSCULAR; INTRAVENOUS at 14:33

## 2018-01-01 RX ADMIN — FENTANYL CITRATE 50 MCG: 50 INJECTION INTRAMUSCULAR; INTRAVENOUS at 14:06

## 2018-01-01 RX ADMIN — ASCORBIC ACID 1500 MG: 500 INJECTION INTRAVENOUS at 03:58

## 2018-01-01 RX ADMIN — ENOXAPARIN SODIUM 90 MG: 100 INJECTION SUBCUTANEOUS at 00:39

## 2018-01-01 RX ADMIN — ACETAMINOPHEN 650 MG: 650 SOLUTION ORAL at 02:28

## 2018-01-01 RX ADMIN — LORAZEPAM 1 MG: 2 INJECTION INTRAMUSCULAR; INTRAVENOUS at 23:49

## 2018-01-01 RX ADMIN — MICAFUNGIN SODIUM 150 MG: 20 INJECTION, POWDER, LYOPHILIZED, FOR SOLUTION INTRAVENOUS at 03:12

## 2018-01-01 RX ADMIN — DOCUSATE SODIUM 100 MG: 50 LIQUID ORAL at 21:26

## 2018-01-01 RX ADMIN — POTASSIUM CHLORIDE 20 MEQ: 29.8 INJECTION, SOLUTION INTRAVENOUS at 08:06

## 2018-01-01 RX ADMIN — IPRATROPIUM BROMIDE AND ALBUTEROL SULFATE 1 AMPULE: .5; 3 SOLUTION RESPIRATORY (INHALATION) at 16:17

## 2018-01-01 RX ADMIN — ENOXAPARIN SODIUM 80 MG: 100 INJECTION SUBCUTANEOUS at 11:30

## 2018-01-01 RX ADMIN — IPRATROPIUM BROMIDE AND ALBUTEROL SULFATE 1 AMPULE: .5; 3 SOLUTION RESPIRATORY (INHALATION) at 16:38

## 2018-01-01 RX ADMIN — SODIUM CHLORIDE: 234 INJECTION INTRAMUSCULAR; INTRAVENOUS; SUBCUTANEOUS at 17:42

## 2018-01-01 RX ADMIN — IMMUNE GLOBULIN (HUMAN) 10 G: 10 INJECTION INTRAVENOUS; SUBCUTANEOUS at 12:22

## 2018-01-01 RX ADMIN — PIPERACILLIN SODIUM,TAZOBACTAM SODIUM 3.38 G: 3; .375 INJECTION, POWDER, FOR SOLUTION INTRAVENOUS at 08:50

## 2018-01-01 RX ADMIN — HYDROCORTISONE SODIUM SUCCINATE 50 MG: 100 INJECTION, POWDER, FOR SOLUTION INTRAMUSCULAR; INTRAVENOUS at 11:21

## 2018-01-01 RX ADMIN — ASCORBIC ACID 1500 MG: 500 INJECTION INTRAVENOUS at 15:19

## 2018-01-01 RX ADMIN — PHENYLEPHRINE HYDROCHLORIDE 200 MCG: 10 INJECTION INTRAVENOUS at 13:33

## 2018-01-01 RX ADMIN — KETAMINE HYDROCHLORIDE 100 MG: 50 INJECTION, SOLUTION INTRAMUSCULAR; INTRAVENOUS at 06:59

## 2018-01-01 RX ADMIN — ASCORBIC ACID 1500 MG: 500 INJECTION INTRAVENOUS at 11:16

## 2018-01-01 RX ADMIN — DEXMEDETOMIDINE 0.7 MCG/KG/HR: 100 INJECTION, SOLUTION, CONCENTRATE INTRAVENOUS at 12:26

## 2018-01-01 RX ADMIN — Medication 10 ML: at 09:16

## 2018-01-01 RX ADMIN — PIPERACILLIN SODIUM AND TAZOBACTAM SODIUM 3.38 G: 3; .375 INJECTION, POWDER, LYOPHILIZED, FOR SOLUTION INTRAVENOUS at 09:39

## 2018-01-01 RX ADMIN — IPRATROPIUM BROMIDE AND ALBUTEROL SULFATE 1 AMPULE: .5; 3 SOLUTION RESPIRATORY (INHALATION) at 12:11

## 2018-01-01 RX ADMIN — MEROPENEM 1 G: 1 INJECTION, POWDER, FOR SOLUTION INTRAVENOUS at 04:00

## 2018-01-01 RX ADMIN — Medication 10 ML: at 10:02

## 2018-01-01 RX ADMIN — TOBRAMYCIN AND DEXAMETHASONE 1 DROP: 3; 1 SUSPENSION/ DROPS OPHTHALMIC at 20:18

## 2018-01-01 RX ADMIN — HYDRALAZINE HYDROCHLORIDE 10 MG: 20 INJECTION INTRAMUSCULAR; INTRAVENOUS at 20:47

## 2018-01-01 RX ADMIN — Medication 10 ML: at 21:30

## 2018-01-01 RX ADMIN — HYDROCORTISONE SODIUM SUCCINATE 50 MG: 100 INJECTION, POWDER, FOR SOLUTION INTRAMUSCULAR; INTRAVENOUS at 01:35

## 2018-01-01 RX ADMIN — HYDROCORTISONE SODIUM SUCCINATE 50 MG: 100 INJECTION, POWDER, FOR SOLUTION INTRAMUSCULAR; INTRAVENOUS at 11:10

## 2018-01-01 RX ADMIN — MEROPENEM 1 G: 1 INJECTION, POWDER, FOR SOLUTION INTRAVENOUS at 18:09

## 2018-01-01 RX ADMIN — ASCORBIC ACID 1500 MG: 500 INJECTION INTRAVENOUS at 22:00

## 2018-01-01 RX ADMIN — ROCURONIUM BROMIDE 30 MG: 10 INJECTION INTRAVENOUS at 13:03

## 2018-01-01 RX ADMIN — PROPOFOL 30 MCG/KG/MIN: 10 INJECTION, EMULSION INTRAVENOUS at 20:22

## 2018-01-01 RX ADMIN — VANCOMYCIN HYDROCHLORIDE 1250 MG: 5 INJECTION, POWDER, LYOPHILIZED, FOR SOLUTION INTRAVENOUS at 12:13

## 2018-01-01 RX ADMIN — ASCORBIC ACID 1500 MG: 500 INJECTION INTRAVENOUS at 09:50

## 2018-01-01 RX ADMIN — LIDOCAINE HYDROCHLORIDE 20 MG: 20 INJECTION, SOLUTION INFILTRATION; PERINEURAL at 12:26

## 2018-01-01 RX ADMIN — POTASSIUM CHLORIDE 20 MEQ: 29.8 INJECTION, SOLUTION INTRAVENOUS at 06:56

## 2018-01-01 RX ADMIN — MEROPENEM 1 G: 1 INJECTION, POWDER, FOR SOLUTION INTRAVENOUS at 10:50

## 2018-01-01 RX ADMIN — THIAMINE HYDROCHLORIDE 200 MG: 100 INJECTION, SOLUTION INTRAMUSCULAR; INTRAVENOUS at 20:48

## 2018-01-01 RX ADMIN — HYDROCORTISONE SODIUM SUCCINATE 50 MG: 100 INJECTION, POWDER, FOR SOLUTION INTRAMUSCULAR; INTRAVENOUS at 20:35

## 2018-01-01 RX ADMIN — Medication 20 MCG/MIN: at 09:53

## 2018-01-01 RX ADMIN — SODIUM CHLORIDE, PRESERVATIVE FREE 10 ML: 5 INJECTION INTRAVENOUS at 12:29

## 2018-01-01 RX ADMIN — SODIUM CHLORIDE 250 ML: 9 INJECTION, SOLUTION INTRAVENOUS at 16:07

## 2018-01-01 RX ADMIN — ENOXAPARIN SODIUM 80 MG: 100 INJECTION SUBCUTANEOUS at 13:10

## 2018-01-01 RX ADMIN — Medication 10 ML: at 08:50

## 2018-01-01 RX ADMIN — POTASSIUM CHLORIDE 20 MEQ: 400 INJECTION, SOLUTION INTRAVENOUS at 06:09

## 2018-01-01 RX ADMIN — FENTANYL CITRATE 100 MCG/HR: 50 INJECTION, SOLUTION INTRAMUSCULAR; INTRAVENOUS at 02:11

## 2018-01-01 RX ADMIN — GANCICLOVIR SODIUM 200 MG: 500 INJECTION, POWDER, LYOPHILIZED, FOR SOLUTION INTRAVENOUS at 01:46

## 2018-01-01 RX ADMIN — PHENYLEPHRINE HYDROCHLORIDE 100 MCG: 10 INJECTION INTRAVENOUS at 13:41

## 2018-01-01 RX ADMIN — GANCICLOVIR SODIUM 400 MG: 500 INJECTION, POWDER, LYOPHILIZED, FOR SOLUTION INTRAVENOUS at 02:56

## 2018-01-01 RX ADMIN — Medication 10 ML: at 21:23

## 2018-01-01 RX ADMIN — ENOXAPARIN SODIUM 80 MG: 100 INJECTION SUBCUTANEOUS at 00:08

## 2018-01-01 RX ADMIN — FUROSEMIDE 20 MG: 10 INJECTION, SOLUTION INTRAMUSCULAR; INTRAVENOUS at 18:35

## 2018-01-01 RX ADMIN — POTASSIUM PHOSPHATE, MONOBASIC AND POTASSIUM PHOSPHATE, DIBASIC 13 MMOL: 224; 236 INJECTION, SOLUTION INTRAVENOUS at 07:50

## 2018-01-01 RX ADMIN — ASCORBIC ACID 1500 MG: 500 INJECTION INTRAVENOUS at 23:08

## 2018-01-01 RX ADMIN — FUROSEMIDE 20 MG: 10 INJECTION, SOLUTION INTRAMUSCULAR; INTRAVENOUS at 12:33

## 2018-01-01 RX ADMIN — SULFAMETHOXAZOLE AND TRIMETHOPRIM 433.6 MG: 80; 16 INJECTION, SOLUTION, CONCENTRATE INTRAVENOUS at 22:57

## 2018-01-01 RX ADMIN — FUROSEMIDE 20 MG: 10 INJECTION, SOLUTION INTRAMUSCULAR; INTRAVENOUS at 09:27

## 2018-01-01 RX ADMIN — PANTOPRAZOLE SODIUM 40 MG: 40 INJECTION, POWDER, FOR SOLUTION INTRAVENOUS at 08:36

## 2018-01-01 RX ADMIN — PIPERACILLIN SODIUM AND TAZOBACTAM SODIUM 3.38 G: 3; .375 INJECTION, POWDER, LYOPHILIZED, FOR SOLUTION INTRAVENOUS at 08:39

## 2018-01-01 RX ADMIN — GANCICLOVIR SODIUM 200 MG: 500 INJECTION, POWDER, LYOPHILIZED, FOR SOLUTION INTRAVENOUS at 15:39

## 2018-01-01 RX ADMIN — ENOXAPARIN SODIUM 90 MG: 100 INJECTION SUBCUTANEOUS at 11:13

## 2018-01-01 RX ADMIN — ASCORBIC ACID 1500 MG: 500 INJECTION INTRAVENOUS at 16:30

## 2018-01-01 RX ADMIN — BUMETANIDE 2 MG: 0.25 INJECTION INTRAMUSCULAR; INTRAVENOUS at 01:16

## 2018-01-01 RX ADMIN — MEROPENEM 1 G: 1 INJECTION, POWDER, FOR SOLUTION INTRAVENOUS at 16:59

## 2018-01-01 RX ADMIN — PROPOFOL 50 MG: 10 INJECTION, EMULSION INTRAVENOUS at 12:26

## 2018-01-01 RX ADMIN — Medication 10 ML: at 07:46

## 2018-01-01 RX ADMIN — HYDROCORTISONE SODIUM SUCCINATE 50 MG: 100 INJECTION, POWDER, FOR SOLUTION INTRAMUSCULAR; INTRAVENOUS at 02:30

## 2018-01-01 RX ADMIN — VANCOMYCIN HYDROCHLORIDE 1250 MG: 5 INJECTION, POWDER, LYOPHILIZED, FOR SOLUTION INTRAVENOUS at 09:40

## 2018-01-01 RX ADMIN — IMMUNE GLOBULIN (HUMAN) 20 G: 10 INJECTION INTRAVENOUS; SUBCUTANEOUS at 10:46

## 2018-01-01 RX ADMIN — ENOXAPARIN SODIUM 90 MG: 100 INJECTION SUBCUTANEOUS at 09:59

## 2018-01-01 RX ADMIN — IPRATROPIUM BROMIDE AND ALBUTEROL SULFATE 1 AMPULE: .5; 3 SOLUTION RESPIRATORY (INHALATION) at 01:24

## 2018-01-01 RX ADMIN — MYCOPHENOLATE MOFETIL 1000 MG: 500 TABLET ORAL at 20:17

## 2018-01-01 RX ADMIN — PIPERACILLIN SODIUM,TAZOBACTAM SODIUM 3.38 G: 3; .375 INJECTION, POWDER, FOR SOLUTION INTRAVENOUS at 16:40

## 2018-01-01 RX ADMIN — ACETAMINOPHEN 650 MG: 650 SOLUTION ORAL at 21:36

## 2018-01-01 RX ADMIN — IMMUNE GLOBULIN (HUMAN) 20 G: 10 INJECTION INTRAVENOUS; SUBCUTANEOUS at 13:14

## 2018-01-01 RX ADMIN — ENOXAPARIN SODIUM 90 MG: 100 INJECTION SUBCUTANEOUS at 11:26

## 2018-01-01 RX ADMIN — SODIUM CHLORIDE: 9 INJECTION, SOLUTION INTRAVENOUS at 12:45

## 2018-01-01 RX ADMIN — ACYCLOVIR SODIUM 400 MG: 50 INJECTION, SOLUTION INTRAVENOUS at 00:02

## 2018-01-01 RX ADMIN — VANCOMYCIN HYDROCHLORIDE 1250 MG: 5 INJECTION, POWDER, LYOPHILIZED, FOR SOLUTION INTRAVENOUS at 09:30

## 2018-01-01 RX ADMIN — Medication 10 ML: at 21:52

## 2018-01-01 RX ADMIN — ROCURONIUM BROMIDE 20 MG: 10 INJECTION INTRAVENOUS at 13:29

## 2018-01-01 RX ADMIN — PANTOPRAZOLE SODIUM 40 MG: 40 INJECTION, POWDER, FOR SOLUTION INTRAVENOUS at 08:41

## 2018-01-01 RX ADMIN — Medication 10 ML: at 21:29

## 2018-01-01 RX ADMIN — Medication 10 ML: at 08:04

## 2018-01-01 RX ADMIN — FENTANYL CITRATE 75 MCG/HR: 50 INJECTION, SOLUTION INTRAMUSCULAR; INTRAVENOUS at 19:28

## 2018-01-01 RX ADMIN — HYDROCORTISONE SODIUM SUCCINATE 50 MG: 100 INJECTION, POWDER, FOR SOLUTION INTRAMUSCULAR; INTRAVENOUS at 20:45

## 2018-01-01 RX ADMIN — MEROPENEM 1 G: 1 INJECTION, POWDER, FOR SOLUTION INTRAVENOUS at 19:02

## 2018-01-01 RX ADMIN — PANTOPRAZOLE SODIUM 40 MG: 40 INJECTION, POWDER, FOR SOLUTION INTRAVENOUS at 22:16

## 2018-01-01 RX ADMIN — MEROPENEM 1 G: 1 INJECTION, POWDER, FOR SOLUTION INTRAVENOUS at 21:26

## 2018-01-01 RX ADMIN — Medication 10 ML: at 20:48

## 2018-01-01 RX ADMIN — GANCICLOVIR SODIUM 400 MG: 500 INJECTION, POWDER, LYOPHILIZED, FOR SOLUTION INTRAVENOUS at 14:09

## 2018-01-01 RX ADMIN — HYDROCORTISONE SODIUM SUCCINATE 50 MG: 100 INJECTION, POWDER, FOR SOLUTION INTRAMUSCULAR; INTRAVENOUS at 01:41

## 2018-01-01 RX ADMIN — SODIUM CHLORIDE, POTASSIUM CHLORIDE, SODIUM LACTATE AND CALCIUM CHLORIDE 500 ML: 600; 310; 30; 20 INJECTION, SOLUTION INTRAVENOUS at 06:30

## 2018-01-01 RX ADMIN — MORPHINE SULFATE 30 MG: 1 INJECTION INTRAVENOUS at 00:46

## 2018-01-01 RX ADMIN — SODIUM CHLORIDE, POTASSIUM CHLORIDE, SODIUM LACTATE AND CALCIUM CHLORIDE: 600; 310; 30; 20 INJECTION, SOLUTION INTRAVENOUS at 11:29

## 2018-01-01 RX ADMIN — TOBRAMYCIN AND DEXAMETHASONE 1 DROP: 3; 1 SUSPENSION/ DROPS OPHTHALMIC at 21:55

## 2018-01-01 RX ADMIN — POTASSIUM CHLORIDE 20 MEQ: 29.8 INJECTION, SOLUTION INTRAVENOUS at 06:35

## 2018-01-01 RX ADMIN — MEROPENEM 1 G: 1 INJECTION, POWDER, FOR SOLUTION INTRAVENOUS at 08:31

## 2018-01-01 RX ADMIN — SULFAMETHOXAZOLE AND TRIMETHOPRIM 433.6 MG: 80; 16 INJECTION, SOLUTION, CONCENTRATE INTRAVENOUS at 16:00

## 2018-01-01 RX ADMIN — DILTIAZEM HYDROCHLORIDE 10 MG/HR: 5 INJECTION INTRAVENOUS at 20:08

## 2018-01-01 ASSESSMENT — PAIN SCALES - GENERAL
PAINLEVEL_OUTOF10: 0
PAINLEVEL_OUTOF10: 1
PAINLEVEL_OUTOF10: 7
PAINLEVEL_OUTOF10: 0
PAINLEVEL_OUTOF10: 3
PAINLEVEL_OUTOF10: 0
PAINLEVEL_OUTOF10: 7
PAINLEVEL_OUTOF10: 0
PAINLEVEL_OUTOF10: 4
PAINLEVEL_OUTOF10: 0
PAINLEVEL_OUTOF10: 0
PAINLEVEL_OUTOF10: 4
PAINLEVEL_OUTOF10: 0
PAINLEVEL_OUTOF10: 3
PAINLEVEL_OUTOF10: 0
PAINLEVEL_OUTOF10: 7
PAINLEVEL_OUTOF10: 0
PAINLEVEL_OUTOF10: 2
PAINLEVEL_OUTOF10: 8
PAINLEVEL_OUTOF10: 0
PAINLEVEL_OUTOF10: 9
PAINLEVEL_OUTOF10: 0
PAINLEVEL_OUTOF10: 1
PAINLEVEL_OUTOF10: 0
PAINLEVEL_OUTOF10: 3
PAINLEVEL_OUTOF10: 0

## 2018-01-01 ASSESSMENT — PULMONARY FUNCTION TESTS
PIF_VALUE: 23
PIF_VALUE: 3
PIF_VALUE: 17
PIF_VALUE: 14
PIF_VALUE: 1
PIF_VALUE: 1
PIF_VALUE: 15
PIF_VALUE: 14
PIF_VALUE: 0
PIF_VALUE: 3
PIF_VALUE: 3
PIF_VALUE: 0
PIF_VALUE: 15
PIF_VALUE: 14
PIF_VALUE: 16
PIF_VALUE: 25
PIF_VALUE: 16
PIF_VALUE: 13
PIF_VALUE: 20
PIF_VALUE: 17
PIF_VALUE: 18
PIF_VALUE: 17
PIF_VALUE: 3
PIF_VALUE: 16
PIF_VALUE: 13
PIF_VALUE: 1
PIF_VALUE: 16
PIF_VALUE: 26
PIF_VALUE: 16
PIF_VALUE: 16
PIF_VALUE: 0
PIF_VALUE: 17
PIF_VALUE: 23
PIF_VALUE: 16
PIF_VALUE: 13
PIF_VALUE: 21
PIF_VALUE: 16
PIF_VALUE: 27
PIF_VALUE: 16
PIF_VALUE: 1
PIF_VALUE: 16
PIF_VALUE: 10
PIF_VALUE: 0
PIF_VALUE: 12
PIF_VALUE: 25
PIF_VALUE: 11
PIF_VALUE: 13
PIF_VALUE: 0
PIF_VALUE: 0
PIF_VALUE: 18
PIF_VALUE: 17
PIF_VALUE: 16
PIF_VALUE: 26
PIF_VALUE: 16
PIF_VALUE: 31
PIF_VALUE: 16
PIF_VALUE: 0
PIF_VALUE: 0
PIF_VALUE: 17
PIF_VALUE: 0
PIF_VALUE: 0
PIF_VALUE: 12
PIF_VALUE: 3
PIF_VALUE: 16
PIF_VALUE: 17
PIF_VALUE: 20
PIF_VALUE: 16
PIF_VALUE: 16
PIF_VALUE: 19
PIF_VALUE: 0
PIF_VALUE: 26
PIF_VALUE: 0
PIF_VALUE: 11
PIF_VALUE: 16
PIF_VALUE: 23
PIF_VALUE: 17
PIF_VALUE: 13
PIF_VALUE: 14
PIF_VALUE: 16
PIF_VALUE: 0
PIF_VALUE: 2
PIF_VALUE: 2
PIF_VALUE: 0
PIF_VALUE: 1
PIF_VALUE: 10
PIF_VALUE: 0
PIF_VALUE: 15
PIF_VALUE: 11
PIF_VALUE: 11
PIF_VALUE: 16
PIF_VALUE: 0
PIF_VALUE: 17
PIF_VALUE: 18
PIF_VALUE: 1
PIF_VALUE: 17
PIF_VALUE: 13
PIF_VALUE: 0
PIF_VALUE: 0
PIF_VALUE: 10
PIF_VALUE: 0
PIF_VALUE: 13
PIF_VALUE: 15
PIF_VALUE: 0
PIF_VALUE: 16
PIF_VALUE: 4
PIF_VALUE: 3
PIF_VALUE: 3
PIF_VALUE: 16
PIF_VALUE: 0
PIF_VALUE: 18
PIF_VALUE: 18
PIF_VALUE: 2
PIF_VALUE: 17
PIF_VALUE: 0
PIF_VALUE: 23
PIF_VALUE: 16
PIF_VALUE: 0
PIF_VALUE: 19
PIF_VALUE: 0
PIF_VALUE: 15
PIF_VALUE: 17
PIF_VALUE: 16
PIF_VALUE: 26
PIF_VALUE: 2
PIF_VALUE: 16
PIF_VALUE: 16
PIF_VALUE: 0
PIF_VALUE: 1
PIF_VALUE: 0
PIF_VALUE: 20
PIF_VALUE: 17
PIF_VALUE: 15
PIF_VALUE: 15
PIF_VALUE: 1
PIF_VALUE: 0
PIF_VALUE: 25
PIF_VALUE: 0
PIF_VALUE: 14
PIF_VALUE: 13
PIF_VALUE: 13
PIF_VALUE: 15
PIF_VALUE: 10
PIF_VALUE: 21
PIF_VALUE: 16
PIF_VALUE: 15
PIF_VALUE: 15
PIF_VALUE: 16
PIF_VALUE: 14
PIF_VALUE: 17

## 2018-01-01 ASSESSMENT — PAIN DESCRIPTION - DESCRIPTORS
DESCRIPTORS: SHARP
DESCRIPTORS: ACHING
DESCRIPTORS: PRESSURE
DESCRIPTORS: ACHING;CONSTANT
DESCRIPTORS: ACHING

## 2018-01-01 ASSESSMENT — ENCOUNTER SYMPTOMS
BACK PAIN: 1
SHORTNESS OF BREATH: 0
SHORTNESS OF BREATH: 1
CONSTIPATION: 1
BACK PAIN: 1
VOMITING: 0
BACK PAIN: 1
NAUSEA: 0
DIARRHEA: 0
ABDOMINAL PAIN: 0
BACK PAIN: 1

## 2018-01-01 ASSESSMENT — PAIN DESCRIPTION - PROGRESSION
CLINICAL_PROGRESSION: NOT CHANGED
CLINICAL_PROGRESSION: GRADUALLY WORSENING
CLINICAL_PROGRESSION: NOT CHANGED
CLINICAL_PROGRESSION: NOT CHANGED

## 2018-01-01 ASSESSMENT — PAIN SCALES - WONG BAKER

## 2018-01-01 ASSESSMENT — PAIN DESCRIPTION - PAIN TYPE
TYPE: ACUTE PAIN
TYPE: ACUTE PAIN;CHRONIC PAIN
TYPE: ACUTE PAIN
TYPE: SURGICAL PAIN

## 2018-01-01 ASSESSMENT — PAIN DESCRIPTION - ONSET
ONSET: ON-GOING
ONSET: ON-GOING

## 2018-01-01 ASSESSMENT — PAIN - FUNCTIONAL ASSESSMENT
PAIN_FUNCTIONAL_ASSESSMENT: 0-10

## 2018-01-01 ASSESSMENT — PAIN DESCRIPTION - LOCATION
LOCATION: BACK

## 2018-01-01 ASSESSMENT — PAIN DESCRIPTION - FREQUENCY
FREQUENCY: INTERMITTENT
FREQUENCY: CONTINUOUS

## 2018-01-01 ASSESSMENT — PAIN DESCRIPTION - ORIENTATION
ORIENTATION: RIGHT;LEFT;LOWER
ORIENTATION: LOWER

## 2018-05-22 PROBLEM — S32.030A COMPRESSION FRACTURE OF L3 LUMBAR VERTEBRA, CLOSED, INITIAL ENCOUNTER (HCC): Status: ACTIVE | Noted: 2018-01-01

## 2018-05-22 PROBLEM — E87.6 HYPOKALEMIA: Status: ACTIVE | Noted: 2018-01-01

## 2018-05-22 PROBLEM — I82.4Z3 ACUTE DEEP VEIN THROMBOSIS (DVT) OF DISTAL VEIN OF BOTH LOWER EXTREMITIES (HCC): Status: ACTIVE | Noted: 2018-01-01

## 2018-05-22 PROBLEM — I10 BENIGN ESSENTIAL HYPERTENSION: Status: ACTIVE | Noted: 2018-01-01

## 2018-05-22 PROBLEM — E78.2 MIXED HYPERLIPIDEMIA: Status: ACTIVE | Noted: 2018-01-01

## 2018-05-22 PROBLEM — J43.9 PULMONARY EMPHYSEMA (HCC): Status: ACTIVE | Noted: 2018-01-01

## 2018-06-26 PROBLEM — M47.816 LUMBAR SPONDYLOSIS: Status: ACTIVE | Noted: 2018-01-01

## 2018-06-26 PROBLEM — M48.061 SPINAL STENOSIS OF LUMBAR REGION WITHOUT NEUROGENIC CLAUDICATION: Status: ACTIVE | Noted: 2018-01-01

## 2018-07-23 NOTE — H&P
[address] [address]  History and Physical Update    Pt Name: Mode Coronel  MRN: [id number]  YOB: 1940  Date of evaluation: 7/23/2018      I have examined the patient and reviewed the H&P/Consult and there are no changes to the patient or plans.         Electronically signed by Rich Dolan MD on 7/23/2018 at 11:41 AM

## 2018-07-23 NOTE — OP NOTE
levels of L3-4  and L4-5 bilateral.    For L5 Median branch block the junction of the ala of  the sacrum with the superior articular process of the facet joint was taken as a reference point and L4 median branch the junction of the transverse process the L5 with the superolateral possible facet joint was taken to the point and healthy median branch the junction of the transverse process of L4 with the superior lateral process of the facet joint was taken as a reference point. For S1 median branch the most lateral and superior aspect of S1 foramina was taken as a reference point. Patient's vital signs and neurological status remained stable through  the procedure and post procedural  Period. Patient was instructed to keep track of pain for next 24 hours. every hour and bring it with next visit. Patient tollerated the procedure well and was discharged home in stable condition.

## 2018-08-09 NOTE — PROGRESS NOTES
Impression       1. Chronic compression fracture of L3.   2. No new compression fractures. 3. Moderate severity spinal canal stenosis with mild bilateral foraminal stenosis due to degenerative changes at the L4-5 level. 4. Mild to moderate spinal canal stenosis at the L2-3 level and posterior to L3.           The patient is allergic to losartan and tiotropium. Past Medical History  Tenzin Canales  has a past medical history of Anemia; COPD (chronic obstructive pulmonary disease) ([address] DVT (deep venous thrombosis) ([facility] [address] GERD (gastroesophageal reflux disease); High triglycerides; Hx of blood clots; Hypertension; Neuropathy; and Pulmonary fibrosis ([address] Past Surgical History  The patient  has a past surgical history that includes Abdominal aortic aneurysm repair (2000); hernia repair; Colonoscopy; Fixation Kyphoplasty (10/20/2017); Kyphosis surgery (N/A, 10/20/2017); Cardiac surgery; and pr inj dx/ther agnt paravert facet joint, lumbar/sac, 1st level (Bilateral, 7/23/2018). Family History  This patient's family history includes Depression in his father; Heart Disease in his mother. Social History  Tenzin Canales  reports that he quit smoking about 29 years ago. He quit after 35.00 years of use. He has never used smokeless tobacco. He reports that he drinks alcohol. He reports that he does not use drugs. Medications    Current Outpatient Prescriptions:     HYDROcodone-acetaminophen (NORCO) 5-325 MG per tablet, Take 1 tablet by mouth every 8 hours as needed for Pain for up to 30 days. ., Disp: 90 tablet, Rfl: 0    rivaroxaban (XARELTO) 15 MG TABS tablet, Take 15 mg by mouth, Disp: , Rfl:     pantoprazole (PROTONIX) 40 MG tablet, Take 40 mg by mouth daily, Disp: , Rfl:     predniSONE (DELTASONE) 5 MG tablet, Take 10 mg by mouth daily , Disp: , Rfl:     Mycophenolate Mofetil (CELLCEPT PO), Take 1,000 mg by mouth 2 times daily , Disp: , Rfl:     amLODIPine (NORVASC) 10 MG tablet, Take 10 mg by mouth tenderness found. Left knee: No tenderness found. Cervical back: He exhibits normal range of motion and no bony tenderness. Thoracic back: He exhibits normal range of motion, no tenderness and no bony tenderness. Lumbar back: He exhibits decreased range of motion, tenderness, bony tenderness and pain. He exhibits no swelling and no edema. Back:         Right upper arm: He exhibits no tenderness and no bony tenderness. Left upper arm: He exhibits no tenderness and no bony tenderness. Left forearm: He exhibits no tenderness. Right upper leg: He exhibits no tenderness. Left upper leg: He exhibits no tenderness. Right lower leg: He exhibits no tenderness. Left lower leg: He exhibits no tenderness. Legs:  Decreased ROM lumbar spine    Neurological: He is alert and oriented to person, place, and time. He has normal strength and normal reflexes. A cranial nerve deficit and sensory deficit is present. Coordination normal.   Reflex Scores:       Tricep reflexes are 2+ on the right side and 2+ on the left side. Bicep reflexes are 2+ on the right side and 2+ on the left side. Brachioradialis reflexes are 2+ on the right side and 2+ on the left side. Patellar reflexes are 2+ on the right side and 2+ on the left side. Achilles reflexes are 2+ on the right side and 2+ on the left side. Skin: Skin is warm and dry. Psychiatric: He has a normal mood and affect. JOSSIE test: negative   Yeomans test: negative   Gaenslen test: negative      Assessment:     1. Spondylosis of lumbar region without myelopathy or radiculopathy    2. Lumbar facet arthropathy (HCC)    3. Degeneration of lumbar intervertebral disc    4. Compression fracture of lumbar spine, non-traumatic, initial encounter ([facility] [address]    5. Age-related osteoporosis with current pathological fracture, initial encounter    6.  Compression fracture of L3 lumbar vertebra, closed, initial encounter ([facility] [address]    7. Chronic pain syndrome            Plan:      · OARRS reviewed. · Discussed long term side effects of medications. · Discussed tolerance, dependency and addiction. · Previous UDS reviewed  · UDS preformed today for compliance. · Patient told can not receive any pain medications from any other source. · Discussed with patient that they may not be pain free. · No evidence of abuse, diversion or aberrant behavior. · Medications and/or procedures improve function and quality of life. · Procedure note reviewed in detail. · Received about 80% relief of lower back pain for 2 full days from bilateral L-facet MBB # 1. \"woke up the next day and wanted to get out and go to breakfast with wife\"   · Plan Bilateral L-facet MBB # 2 @ L3-4, L4-5. For diagnostic and therapeutic relief. · Will need clearance from Dr. Chetna Sandoval form clearance for Xarelto to be stopped for 5 days  · Continue Norco TID prn- Filled 7/23/2018- only takes prn and is compliant. Meds. Prescribed:   No orders of the defined types were placed in this encounter. Return for  Bilateral L-facet MBB # 2 @ L3-4, L4-5. , follow up after procedure.          Electronically signed by Moise Curling, APRN - CNP on 8/9/2018 at 11:09 AM

## 2018-10-01 NOTE — OP NOTE
levels of L4-5 and l5-S1 bilateral.    For L5 Median branch block the junction of the ala of  the sacrum with the superior articular process of the facet joint was taken as a reference point and L4 median branch the junction of the transverse process the L5 with the superolateral possible facet joint was taken to the point and healthy median branch the junction of the transverse process of L4 with the superior lateral process of the facet joint was taken as a reference point. For S1 median branch the most lateral and superior aspect of S1 foramina was taken as a reference point. Patient's vital signs and neurological status remained stable through  the procedure and post procedural  Period. Patient was instructed to keep track of pain for next 24 hours. every hour and bring it with next visit. Patient tollerated the procedure well and was discharged home in stable condition.

## 2018-10-01 NOTE — ANESTHESIA POSTPROCEDURE EVALUATION
Department of Anesthesiology  Postprocedure Note    Patient: Domonique Burroughs  MRN: [id number]  YOB: 1940  Date of evaluation: 10/1/2018  Time:  1:05 PM     Procedure Summary     Date:  10/01/18 Room / Location:  [facility] 03 / [address] [facility]    Anesthesia Start:  5319 Anesthesia Stop:  0336    Procedure:  Bilateral L-facet MBB # 2 @ L3-4, L4-5. (Bilateral Back) Diagnosis:  (lumbar spondylosis)    Surgeon:  Gui Dick MD Responsible Provider:  Arpit Sutton MD    Anesthesia Type:  MAC ASA Status:  3          Anesthesia Type: MAC    Dionne Phase I:      Dionne Phase II: Dionne Score: 10    Last vitals: Reviewed and per EMR flowsheets.        Anesthesia Post Evaluation    Patient location during evaluation: PACU  Patient participation: complete - patient participated  Level of consciousness: awake  Nausea & Vomiting: no nausea  Complications: no  Cardiovascular status: hemodynamically stable  Respiratory status: acceptable

## 2018-10-01 NOTE — ANESTHESIA PRE PROCEDURE
Department of Anesthesiology  Preprocedure Note       Name:  Milton Vinson   Age:  68 y.o.  :  1940                                          MRN:  [id number]         Date:  10/1/2018      Surgeon: Karen Garcia):  Dana Romero MD    Procedure: Procedure(s):  Bilateral Lumbar MBB@ L3-4, L4-5    Medications prior to admission:   Prior to Admission medications    Medication Sig Start Date End Date Taking? Authorizing Provider   hydrochlorothiazide (MICROZIDE) 12.5 MG capsule Take 12.5 mg by mouth daily    Historical Provider, MD   docusate sodium (COLACE) 100 MG capsule Take 100 mg by mouth as needed for Constipation    Historical Provider, MD   rivaroxaban (XARELTO) 15 MG TABS tablet Take 15 mg by mouth    Historical Provider, MD   pantoprazole (PROTONIX) 40 MG tablet Take 40 mg by mouth daily    Historical Provider, MD   predniSONE (DELTASONE) 5 MG tablet Take 10 mg by mouth daily     Historical Provider, MD   Mycophenolate Mofetil (CELLCEPT PO) Take 1,000 mg by mouth 2 times daily     Historical Provider, MD       Current medications:    No current outpatient prescriptions on file. No current facility-administered medications for this visit. Allergies:     Allergies   Allergen Reactions    Losartan      Other reaction(s): Unknown Reaction    Tiotropium      Other reaction(s): Unknown Reaction       Problem List:    Patient Active Problem List   Diagnosis Code    Cerumen impaction H61.20    ETD (eustachian tube dysfunction) H69.80    Chronic maxillary sinusitis J32.0    Deviated nasal septum J34.2    Pathologic compression fracture of lumbar vertebra (HCC) M48.56XA    Localized osteoporosis with current pathological fracture M80.80XA    Compression fracture of L3 lumbar vertebra, closed, initial encounter ([facility] [address] S32.030A    Acute deep vein thrombosis (DVT) of distal vein of both lower extremities ([facility]) I82.4Z3    Benign essential hypertension I10    Pulmonary emphysema ([facility] [address] J43.9  Hypokalemia E87.6    Mixed hyperlipidemia E78.2    Acute midline low back pain M54.5    Chronic pain syndrome G89.4    Spondylosis of lumbar region without myelopathy or radiculopathy M47.816    Lumbar spondylosis M47.816    Spinal stenosis of lumbar region without neurogenic claudication M48.061       Past Medical History:        Diagnosis Date    Anemia     COPD (chronic obstructive pulmonary disease) (HCC)     DVT (deep venous thrombosis) (HCC)     GERD (gastroesophageal reflux disease)     High triglycerides     Hx of blood clots     Hypertension     Neuropathy     Pulmonary fibrosis ([facility] [address]        Past Surgical History:        Procedure Laterality Date    ABDOMINAL AORTIC ANEURYSM REPAIR  2000    CARDIAC SURGERY      COLONOSCOPY      FIXATION KYPHOPLASTY  10/20/2017    L3    HERNIA REPAIR      x3    KYPHOSIS SURGERY N/A 10/20/2017    KYPHOPLASTY @ L3 LEFT performed by Landy Carroll MD at [facility] DX/THER AGNT PARAVERT FACET JOINT, LUMBAR/SAC, 1ST LEVEL Bilateral 7/23/2018    Bilateral Lumbar MBB@ L3-4, L4-5 performed by Landy [facility] MD at [facility]       Social History:    Social History   Substance Use Topics    Smoking status: Former Smoker     Years: 35.00     Quit date: 1/1/1989    Smokeless tobacco: Never Used    Alcohol use Yes      Comment: rarely                                Counseling given: Not Answered      Vital Signs (Current): There were no vitals filed for this visit.                                            BP Readings from Last 3 Encounters:   10/01/18 (!) 180/79   08/09/18 136/80   07/23/18 (!) 119/59       NPO Status:                                                                                 BMI:   Wt Readings from Last 3 Encounters:   10/01/18 187 lb (84.8 kg)   08/09/18 171 lb (77.6 kg)   07/23/18 187 lb (84.8 kg)     There is no height or weight on file to calculate BMI.    CBC:   Lab Results

## 2018-10-23 NOTE — PROGRESS NOTES
[address] [facility]  [address] [facility] [address].  Dept: [phone]  Dept Fax: [phone]: [phone]    Visit Date: 10/23/2018    Functionality Assessment/Goals Worksheet     On a scale of 0 (Does not Interfere) to 10 (Completely Interferes)     1. Which number describes how during the past week pain has interfered with       the following:  A. General Activity:  9  B. Mood: 10  C. Walking Ability:  10  D. Normal Work (Includes both work outside the home and housework):  10  E. Relations with Other People:   8  F. Sleep:   2  G. Enjoyment of Life:   8    2. Patient Prefers to Take their Pain Medications:     [x]  On a regular basis   []  Only when necessary    []  Does not take pain medications    3. What are the Patient's Goals/Expectations for Visiting Pain Management? [x]  Learn about my pain    [x]  Receive Medication   []  Physical Therapy     []  Treat Depression   [x]  Receive Injections    []  Treat Sleep   []  Deal with Anxiety and Stress   []  Treat Opoid Dependence/Addiction   []  Other:      HPI:   Wilfredo Cummings is a 68 y.o. male is here today for    Chief Complaint: Lower back pain     Wife present     HPI   FU bilateral L-facet MBB # 2 from 10/1/2018. Received 75%-80% relief of lower back pain for 5 full days. Was able to tolerate walking better. Then pain returned back to what it was prior to procedure. Taking Norco daily prn which helps. Pain is all across lower back. Medications reviewed. Patient denies  side effects with medications. Patient states he is  taking medications as prescribed. Hedenies receiving pain medications from other sources. He denies any ER visits since last visit. Pain scale with out pain medications is 6/10. Pain scale with pain medications is  3/10.   Last dose of [place] was today  Drug screen reviewed from 8/9/2018 and was appropriate  Pill count was completed today and was appropriate    MRI Lumbar spine 5/23/18  FINDINGS:           Again, there is a compression fracture of L3. This has been treated by vertebroplasty since the prior exam. There is only a small area of high signal in the vertebral body. This is likely most likely some disc material extending into the vertebral body    secondary to the fracture. There is no bone marrow edema. The height loss is 75%.       There are no acute compression fractures. There is no suspicious marrow signal abnormality. There is no edema in the posterior elements. No pars defects are noted.  There is disc desiccation at the L3-4 and L4-5 levels.       The distal spinal cord, conus medullaris and cauda equina are normal.        There are no gross abnormalities in the visualized aspects of the distal thoracic spine.       On the axial images, at L1-L2, there are mild facet degenerative changes. There is no spinal canal or foraminal stenosis.       At L2-L3, there is a diffuse disc bulge. There are moderate severity facet degenerative changes. There is some thickening of ligamentum flavum. There is mild/moderate spinal canal stenosis. There is no foraminal stenosis.       Posterior to L3, there is mild to moderate spinal canal stenosis. There are facet degenerative changes.       At L3-L4, there is a diffuse disc bulge. There are facet degenerative changes. There is mild spinal canal stenosis. There is mild bilateral foraminal stenosis.       At L4-L5, there are moderate severity facet degenerative changes. There is a diffuse disc bulge. There is moderate severity spinal canal stenosis. There is stenosis of both lateral recesses. There is mild bilateral foraminal stenosis.       At L5-S1, there is no spinal canal stenosis. There is mild left foraminal stenosis.       There are no suspicious findings in the visualized aspects of the retroperitoneum and paraspinal soft tissues.           Impression       1.  Chronic of motion. Left hip: He exhibits normal range of motion. Right knee: No tenderness found. Left knee: No tenderness found. Cervical back: He exhibits normal range of motion and no bony tenderness. Thoracic back: He exhibits normal range of motion, no tenderness and no bony tenderness. Lumbar back: He exhibits decreased range of motion, tenderness, bony tenderness and pain. He exhibits no swelling and no edema. Back:         Right upper arm: He exhibits no tenderness and no bony tenderness. Left upper arm: He exhibits no tenderness and no bony tenderness. Left forearm: He exhibits no tenderness. Right upper leg: He exhibits no tenderness. Left upper leg: He exhibits no tenderness. Right lower leg: He exhibits no tenderness. Left lower leg: He exhibits no tenderness. Legs:  Decreased ROM lumbar spine    Neurological: He is alert and oriented to person, place, and time. He has normal strength and normal reflexes. A cranial nerve deficit and sensory deficit is present. Coordination normal.   Reflex Scores:       Tricep reflexes are 2+ on the right side and 2+ on the left side. Bicep reflexes are 2+ on the right side and 2+ on the left side. Brachioradialis reflexes are 2+ on the right side and 2+ on the left side. Patellar reflexes are 2+ on the right side and 2+ on the left side. Achilles reflexes are 2+ on the right side and 2+ on the left side. Skin: Skin is warm and dry. Psychiatric: He has a normal mood and affect. JOSSIE test: negative   Yeomans test: negative   Gaenslen test: negative      Assessment:     1. Spondylosis of lumbar region without myelopathy or radiculopathy    2. Lumbar facet arthropathy    3. Degeneration of lumbar intervertebral disc    4.  Compression fracture of lumbar spine, non-traumatic, initial encounter ([facility] [address]    5. Age-related osteoporosis with current

## 2018-11-13 NOTE — OP NOTE
radiofrequency abalation of median branches for long term pain releif. The procedure and risks  were discussed with the patient and an informed consent was obtained. Procedure:  Left side   A meaningful communication was kept up with the patient throughout the procedure. The patient is placed in prone position and skin over the back was prepped and draped in sterile manner. Then using fluoroscopy the junction of the transverse process of the vertebra with the superior process of the facet joint was observed and the view was optimized. The skin and deep tissues posterior were infiltrated with 6 ml of  1% xylocaine. The RF canula with the 10 mm active tip was introduced through the skin wheal under fluoroscopy guidance such that the tip of the needle lies in the groove of the transverse process with the superior processes of the facet joint. Then a lateral view of the lumbar spine was obtained to make sure the tip of needle is not in the neural foramen. Then electric impedence was checked to make sure it is acceptable. Then a sensory stimulus was applied at 50 Hz up to 1 volt and concordant pain symptoms were reproduced. Then a motor stimulus was applied at 2 Hz up to 2 volts and no motor stimulation was seen in lower extremities. Some multifidus stimulus was seen. Then after negative aspiration a mixture of depomedrol 80 mg  and 0.2%  ropivacaine was injected through the needle. Then a initial lesion was done at 80 degrees centigrade for 90 seconds. For L5 median branch block the junction of the ala of  the sacrum with the superior articular process of the facet joint was taken as a reference point. For the L4 median branch the junction of the transverse process of L5 with the superolateral possible facet joint was taken as a reference point and for S1 median branch the most lateral and superior aspect of S1 foramina was taken as a reference point,.   For L3 median branch the junction of L4

## 2018-11-13 NOTE — ANESTHESIA POSTPROCEDURE EVALUATION
Department of Anesthesiology  Postprocedure Note    Patient: Renea Cantu  MRN: [id number]  YOB: 1940  Date of evaluation: 11/13/2018  Time:  12:53 PM     Procedure Summary     Date:  11/13/18 Room / Location:  [facility] 03 / [address] [facility]    Anesthesia Start:  1214 Anesthesia Stop:  1229    Procedure:  LUMBAR RFA @ L3-4, L4-5 LEFT SIDE FIRST (Left ) Diagnosis:  (lumbar spondylosis)    Surgeon:  Armando Montez MD Responsible Provider:  Harriet Liu DO    Anesthesia Type:  MAC ASA Status:  3          Anesthesia Type: MAC    Dionne Phase I:      Dionne Phase II: Dionne Score: 10    Last vitals: Reviewed and per EMR flowsheets.        Anesthesia Post Evaluation    Patient location during evaluation: bedside  Patient participation: complete - patient participated  Level of consciousness: awake  Airway patency: patent  Nausea & Vomiting: no nausea and no vomiting  Complications: no  Cardiovascular status: hemodynamically stable  Respiratory status: acceptable  Hydration status: stable

## 2018-11-30 PROBLEM — J96.00 ACUTE RESPIRATORY FAILURE (HCC): Status: ACTIVE | Noted: 2018-01-01

## 2018-12-01 PROBLEM — J69.0 ASPIRATION PNEUMONIA (HCC): Status: ACTIVE | Noted: 2018-01-01

## 2018-12-01 NOTE — PROGRESS NOTES
Pharmacy Note  Vancomycin Consult    Richmond Hair is a 66 y.o. male started on Vancomycin for probable aspiration pneumonia; consult received from Dr. Rahul Portillo to manage therapy. Also receiving the following antibiotics: Zosyn. Patient Active Problem List   Diagnosis    Cerumen impaction    ETD (eustachian tube dysfunction)    Chronic maxillary sinusitis    Deviated nasal septum    Pathologic compression fracture of lumbar vertebra (HCC)    Localized osteoporosis with current pathological fracture    Compression fracture of L3 lumbar vertebra, closed, initial encounter ([facility] [address]    Acute deep vein thrombosis (DVT) of distal vein of both lower extremities (HCC)    Benign essential hypertension    Pulmonary emphysema (HCC)    Hypokalemia    Mixed hyperlipidemia    Acute midline low back pain    Chronic pain syndrome    Spondylosis of lumbar region without myelopathy or radiculopathy    Lumbar spondylosis    Spinal stenosis of lumbar region without neurogenic claudication    Acute respiratory failure (HCC)       Allergies:  Losartan and Tiotropium     Temp max: 99.7    No results for input(s): BUN in the last 72 hours. No results for input(s): CREATININE in the last 72 hours. Recent Labs      12/01/18   0741   WBC  20.7*         Intake/Output Summary (Last 24 hours) at 12/01/18 0830  Last data filed at 12/01/18 0630   Gross per 24 hour   Intake 0 ml   Output 10 ml   Net -10 ml     Culture Date Source Results   12/1/18 Blood x 2    12/1/18 Urine    12/1/18 Resp culture          Ht Readings from Last 1 Encounters:   12/01/18 6' (1.829 m)        Wt Readings from Last 1 Encounters:   12/01/18 188 lb 15 oz (85.7 kg)         Body mass index is 25.62 kg/m². CrCl cannot be calculated (Patient's most recent lab result is older than the maximum 10 days allowed. ). Goal Trough Level: 15-20 mcg/mL    Assessment/Plan:  Will initiate vancomycin 1250 mg IV every 24 hours.   Patient's SCr at [facility] was

## 2018-12-01 NOTE — PROGRESS NOTES
Pharmacy Renal Adjustment    Carmela Galvez is a 66 y.o. male. Pharmacy renally adjust the following medications per P[employer] approved policy: Pepcid    No results for input(s): BUN in the last 72 hours. No results for input(s): CREATININE in the last 72 hours. CrCl cannot be calculated (Patient's most recent lab result is older than the maximum 10 days allowed. ).       Height:   Ht Readings from Last 1 Encounters:   12/01/18 6' (1.829 m)     Weight:  Wt Readings from Last 1 Encounters:   12/01/18 188 lb 15 oz (85.7 kg)       SCr at [facility]: 1.4  CrCl: ~48 mL/min    Plan: Adjustments based on renal function:          Decrease Pepcid to 20 mg daily    Thank you,  Mallory Leyva, PharmD

## 2018-12-01 NOTE — PROCEDURES
ICU PROCEDURE - CVC PLACEMENT    12/1/18     INDICATION: septic shock    SITE: [facility]    CONSENT: was obtained after explaining indication/risks to wife    TIME OUT: taken    STERILE PREP: Complete handwashing care was performed by all involved personnel prior to initiation of the procedure. Full maximum sterile field/barrier technique was followed (with cap and mask, gloves and sterile gown, as well as broad field sterile drapes). Local disinfection was performed with broad field application of orange dyed chloraprep solution, which was allowed to dry fully prior to the initiation of the procedure. LOCAL ANESTHETIC: Aqueous lidocaine 1% - total estimated 5 ml    ESTIMATED BLOOD LOSS:5mL    ULTRASOUND GUIDANCE USED:yes    PROCEDURE:  Using modified seldinger technique, the appropriate vessel was located with the introducer needle. The flexible J-tip wire was passed without difficulty or resistance, followed by minimal skin incisions over the introducer needle. The introducer needle was withdrawn and discarded, maintaining manual control of the guidewire at all times. Vessel transduced w tube manometry showing a fall in blood column indicating venous placement. After dilation of the tract, a preflushed 20cm triple lumen CVC catheter was advanced over the guidewire without difficulty or resistance to its full extent. The guidewire was withdrawn and discarded and good return of nonpulsatile, dark venous blood assured through all lumes, with easy flushing of the lumens. The site was reprepped with chlorprep solution followed by a Biopatch device and sutured in place. Hemostasis was assured at the termination of procedure.     COMPLICATIONS:none    POST PROCEDURE CHEST XRAY HAS BEEN ORDERED

## 2018-12-01 NOTE — H&P
LUMBAR/SAC, 1ST LEVEL Bilateral 10/1/2018    Bilateral L-facet MBB # 2 @ L3-4, L4-5. performed by Scarlet Valadez MD at [address] Left 11/13/2018    LUMBAR RFA @ L3-4, L4-5 LEFT SIDE FIRST performed by Scarlet Valadez MD at [address]       Medications Prior to Admission:      Prior to Admission medications    Medication Sig Start Date End Date Taking? Authorizing Provider   HYDROcodone-acetaminophen (NORCO) 5-325 MG per tablet Take 1 tablet by mouth every 8 hours as needed for Pain. Cm Alonso Historical Provider, MD   hydrochlorothiazide (MICROZIDE) 12.5 MG capsule Take 12.5 mg by mouth daily    Historical Provider, MD   docusate sodium (COLACE) 100 MG capsule Take 100 mg by mouth as needed for Constipation    Historical Provider, MD   rivaroxaban (XARELTO) 15 MG TABS tablet Take 15 mg by mouth    Historical Provider, MD   pantoprazole (PROTONIX) 40 MG tablet Take 40 mg by mouth daily    Historical Provider, MD   predniSONE (DELTASONE) 5 MG tablet Take 10 mg by mouth daily     Historical Provider, MD   Mycophenolate Mofetil (CELLCEPT PO) Take 1,000 mg by mouth 2 times daily     Historical Provider, MD       Allergies:  Losartan and Tiotropium    Social History:      The patient currently lives     TOBACCO:   reports that he quit smoking about 29 years ago. He quit after 35.00 years of use. He has never used smokeless tobacco.  ETOH:   reports that he drinks alcohol. Family History:      Reviewed in detail and negative for DM, Cancer, CVA. Positive as follows:    Family History   Problem Relation Age of Onset    Heart Disease Mother     Depression Father        Diet: NPO       REVIEW OF SYSTEMS:   Pertinent positives as noted in the HPI. All other systems reviewed and negative. PHYSICAL EXAM:    BP (!) 96/50   Pulse 121   Resp 24   Ht 6' (1.829 m)   Wt 188 lb 15 oz (85.7 kg)   SpO2 96%   BMI 25.62 kg/m²     General appearance: .  \"On the vent\"  HEENT:  Normal cephalic, atraumatic without obvious deformity. Pupils equal, round, and reactive to light. Extra ocular muscles intact. Conjunctivae/corneas clear. Neck: Supple, with full range of motion. No jugular venous distention. Trachea midline. Respiratory:  Normal respiratory effort. Clear to auscultation, scattered coarse crackles . Cardiovascular: Tachycardia with regular rhythm with normal S1/S2 without murmurs, rubs or gallops. Abdomen: Soft, non-tender, non-distended with normal bowel sounds. Musculoskeletal:  No clubbing, cyanosis or edema bilaterally. Full range of motion without deformity. Skin: Skin color, texture, turgor normal.  Bruised right foot noted  Neurologic:  Neurovascularly intact without any focal sensory/motor deficits. Cranial nerves: II-XII intact, grossly non-focal.  Psychiatric:  Sedated  Capillary Refill: Brisk,< 3 seconds   Peripheral Pulses: +2 palpable, equal bilaterally       Labs:     No results for input(s): WBC, HGB, HCT, PLT in the last 72 hours. No results for input(s): NA, K, CL, CO2, BUN, CREATININE, CALCIUM, PHOS in the last 72 hours. Invalid input(s): MAGNES  No results for input(s): AST, ALT, BILIDIR, BILITOT, ALKPHOS in the last 72 hours. No results for input(s): INR in the last 72 hours. No results for input(s): Marthenia Kalpesh in the last 72 hours. Urinalysis:      Lab Results   Component Value Date    NITRU NEGATIVE 02/06/2015    BLOODU NEGATIVE 02/06/2015    GLUCOSEU NEGATIVE 02/06/2015       Intake & Output:  No intake/output data recorded. No intake/output data recorded.       Radiology:     CXR: I have reviewed the CXR with the following interpretation: Possible aspiration pneumonia  EKG:  I have reviewed the EKG with the following interpretation: Pending    No orders to display            DVT prophylaxis: [] Lovenox                                 [x] SCDs                                 [x] SQ Heparin [] Encourage ambulation           [] Already on Anticoagulation    Code Status:Full    Disposition:    [] Home       [] TCU       [] Rehab       [] Psych       [x] SNF       [] [place]       [] Other-    ASSESSMENT:    Active Hospital Problems    Diagnosis Date Noted    Acute respiratory failure ([facility] [address] [J96.00] 11/30/2018       PLAN:  Possible aspiration pneumonia. Empiric iv unasyn. Blood c/s, bronchial aspirate c/s pending. Consult ID    Acute respiratory failure requiring mechanical ventilation, possibly due to aspiration pneumonia in setting of pulmonary fibrosis. On ETT+ MV. Consult Intensivist    Dehydration. IVF N/S    Metabolic acidosis. IVF N/S    GABE, likely prerenal from dehydration. IVF N/S. Avoid nephrotoxins. Monitor renal function    Leukocytosis, likely due to aspiration pneumonia, steroid tx. Monitor trend    H/O Pulmonary fibrosis. Consult Pulmonary service    H/O Recurrent DVT s/p IVC filter. Resume xarelto    H/O Lower esophageal stricture    GERD. IV PPI    Anemia. H/O Recent GI bleed. Monitor trend            Thank you Deanne Wheatley MD for the opportunity to be involved in this patient's care.     Electronically signed by Shara King MD on 12/1/2018 at 4:48 AM

## 2018-12-02 NOTE — CONSULTS
Consult Note    Patient: Leonardo Figueroa: 1940  Med Rec#: [id number] Acc#: [id number]   Provider Performing Procedure: Zeynep Diamond  Primary Care Physician: Deanne Wheatley MD     No chief complaint on file. Consulted by Dr Earline Berg for acute anemia, GI bleeding and dysphagia, patient had no local GI care had recent EGD for food impaction with aspiration pneumonia , has no visible bleeding , no other GI complains . ROS  General Denies any fever or chills  HEENT Denies any diplopia, tinnitus or vertigo  Resp Denies SOB, chest pain, hemoptysis  Cardiac Denies any chest pain, palpitations, claudication or edema  GI Denies any melena, hematochezia, hematemesis or pyrosis   Denies any frequency, urgency, hesitancy or incontinence  Heme Denies bruising or bleeding easily  Endocrine Denies any history of diabetes or thyroid disease  Neuro Denies any focal motor or sensory deficits  Psychiatric Denies anxiety, depression, suicidal ideation  Skin Denies rashes, itching, open sores    MEDICAL HISTORY   has a past medical history of Anemia; COPD (chronic obstructive pulmonary disease) ([address] DVT (deep venous thrombosis) ([address] GERD (gastroesophageal reflux disease); High triglycerides; Hx of blood clots; Hypertension; Neuropathy; and Pulmonary fibrosis ([address] SURGICAL HISTORY   has a past surgical history that includes Abdominal aortic aneurysm repair (2000); hernia repair; Colonoscopy; Fixation Kyphoplasty (10/20/2017); Kyphosis surgery (N/A, 10/20/2017); Cardiac surgery; pr inj dx/ther agnt paravert facet joint, lumbar/sac, 1st level (Bilateral, 7/23/2018); pr inj dx/ther agnt paravert facet joint, lumbar/sac, 1st level (Bilateral, 10/1/2018); and pr inject rx other periph nerve (Left, 11/13/2018).   Additional information:       ALLERGIES   Allergies as of 11/30/2018 - Review Complete 11/13/2018   Allergen Reaction Noted    Losartan  07/23/2014    Tiotropium  07/23/2014     Additional central venous catheter projects over the right atrium. 4. There are stable infiltrates scattered throughout both lung fields, most consolidated within the left mid and lower chest and right lower chest. Edema and/or pneumonia should be considered. There is no pleural effusion. Follow-up chest radiographs are  recommended to confirm complete resolution. **This report has been created using voice recognition software. It may contain minor errors which are inherent in voice recognition technology. ** Final report electronically signed by Dr. Desire Newby on 12/1/2018 9:15 AM    Xr Chest Portable    Result Date: 12/1/2018  PROCEDURE: XR CHEST PORTABLE CLINICAL INFORMATION: verify vent tube, . COMPARISON: Chest x-ray dated 2/6/2015 TECHNIQUE: AP Portable supine chest xray FINDINGS: Lungs/pleura: There are diffuse bilateral alveolar interstitial infiltrates which may represent pulmonary edema or pneumonia superimposed on underlying pulmonary fibrosis. No pleural effusion. No pneumothorax. Heart: Heart size is grossly normal. Mediastinum/milton: No obvious mass or adenopathy. Skeleton: There are degenerative changes of the thoracic spine. Lines/tubes/devices: Endotracheal tube tip lies 4.8 cm above the marcus in satisfactory position. NG tube tip lies in the proximal stomach. An additional tube is present, tip at the level of thoracic inlet, probably representing an esophageal temperature probe. Lines and tubes as discussed above. Diffuse bilateral alveolar interstitial infiltrates which may represent pulmonary edema or an atypical viral-type pneumonia superimposed on pulmonary fibrosis. **This report has been created using voice recognition software. It may contain minor errors which are inherent in voice recognition technology. ** Final report electronically signed by Dr. Bill Kothari on 12/1/2018 5:37 AM    Xr Abdomen For Ng/og/ne Tube Placement    Result Date: 12/1/2018  PROCEDURE: XR ABDOMEN FOR NG/OG/NE TUBE PLACEMENT CLINICAL INFORMATION: Confirmation of course of NG/OG/NE tube and location of tip of tube, . COMPARISON: No prior study. TECHNIQUE: A supine AP view of the abdomen was obtained. FINDINGS: Lines/tubes: NG tube tip lies in the proximal gastric fundus. Consider advancing the tube 5 to 10 cm. Bowel gas pattern: There is a nonobstructive bowel gas pattern, with gas within nondistended small and large bowel. Free air: There is no obvious pneumoperitoneum. Miscellaneous: There are no suspicious abdominal calcifications. There are multiple surgical clips over the lower lumbar spine. Skeleton: There is a severe L3 compression fracture with evidence of previous vertebroplasty. Devices: There is an IVC filter to the right of the L2 vertebra. Tip of the NG tube lies in the proximal gastric fundus. Consider advancing the tube 5 to 10 cm. Non-obstructive bowel gas pattern. **This report has been created using voice recognition software. It may contain minor errors which are inherent in voice recognition technology. ** Final report electronically signed by Dr. Mary Vásquez on 12/1/2018 5:34 AM    Fluoro For Surgical Procedures    Result Date: 11/13/2018  Radiology exam is complete. No Radiologist dictation. Please follow up with ordering provider.        Impression and plan : anemia, GI bleeding dysphagia , will get GI bleeding scan tomorrow and EGD with enteroscopy Wednesday 1 PM .     Patient Active Problem List   Diagnosis    Cerumen impaction    ETD (eustachian tube dysfunction)    Chronic maxillary sinusitis    Deviated nasal septum    Pathologic compression fracture of lumbar vertebra (HCC)    Localized osteoporosis with current pathological fracture    Compression fracture of L3 lumbar vertebra, closed, initial encounter ([facility] [address]    Acute deep vein thrombosis (DVT) of distal vein of both lower extremities (HCC)    Benign essential hypertension    Pulmonary emphysema (HCC)    Hypokalemia    Mixed

## 2018-12-03 NOTE — PROGRESS NOTES
1510 Patient received in IR for Embolization for GI bleed. Pt denies c/o pain, Diltiazem drip running at 10mg per hour  1525 This procedure has been fully reviewed with the patient and family and written informed consent has been obtained from family. 1535 Procedure started with BJ.VERONICA  4391 Access right femoral artery  1600 Preparing gelfoam  1602 Gelfoam slurry injected into left colonic branch of SMA  1609 Angioseal to right femoral puncture site   1610 Procedure completed; patient tolerated well. Bacitracin, 4x4 and opsite to puncture site; no bleeding or hematoma noted. 1611 Report to ICU  1616 Patient on Bed; comfort ensured. [id number] Patient taken to ICU via bed by ICU RN.

## 2018-12-03 NOTE — DISCHARGE INSTR - COC
Continuity of Care Form    Patient Name: Vignesh Cervantes   :  1940  MRN:  [id number]    Admit date:  2018  Discharge date:  ***    Code Status Order: Full Code   Advance Directives:   Advance Care Flowsheet Documentation     Date/Time Healthcare Directive Type of Healthcare Directive Copy in [address] Agent's Name Healthcare Agent's Phone Number    [phone]  No, patient does not have an advance directive for healthcare treatment -- -- -- -- --          Admitting Physician:  Mandie Mayer MD  PCP: Agus Reyes MD    Discharging Nurse: [facility] Unit/Room#: 4D-12/012-A  Discharging Unit Phone Number: ***    Emergency Contact:   Extended Emergency Contact Information  Primary Emergency Contact: Rosaline Zhang  Address: [facility] [phone]           /, [address] Phone: [phone]  Mobile Phone: [phone]  Relation: Spouse    Past Surgical History:  Past Surgical History:   Procedure Laterality Date    ABDOMINAL AORTIC ANEURYSM REPAIR      CARDIAC SURGERY      COLONOSCOPY      FIXATION KYPHOPLASTY  10/20/2017    L3    HERNIA REPAIR      x3    KYPHOSIS SURGERY N/A 10/20/2017    KYPHOPLASTY @ L3 LEFT performed by Rene Galaviz MD at [address] [place] [address] DX/THER AGNT PARAVERT FACET JOINT, LUMBAR/SAC, 1ST LEVEL Bilateral 2018    Bilateral Lumbar MBB@ L3-4, L4-5 performed by Rene Galaviz MD at [address] [place] [address] DX/THER AGNT PARAVERT FACET JOINT, LUMBAR/SAC, 1ST LEVEL Bilateral 10/1/2018    Bilateral L-facet MBB # 2 @ L3-4, L4-5. performed by Rene Galaviz MD at [address] Left 2018    LUMBAR RFA @ L3-4, L4-5 LEFT SIDE FIRST performed by Rene Galaviz MD at [address] [facility]       Immunization History:   Immunization History   Administered Date(s) Administered    Influenza Vaccine,

## 2018-12-03 NOTE — PROGRESS NOTES
Preparing for transport to Nuclear Medicine  2827 to nuc med with care partner Silvana Barakat RT, transport with monitor  [phone] return from nuc med with Silvana Barakat RT, transport on monitor. Assessment complete at bedside, family updated. Monitor displays normal sinus rhythm. Denies pain or needs at this time. 1451 To IR via bed with RN, monitor. 1626 Return from IR via bed with RN, monitor. Right groin sight soft, no bleeding or bruising. Dressing dry intact. 1630 nauseated, no emesis, cough with gag. Family at bedside. Suctioning. Repositioned. 1700 continues to cough and gag at times, anxious. Lungs clear, bowel sounds active. Right groin site soft, light watery pink drainage on dressing, wicked up, no bleed or bruise. 1730 coughing intermittently, groin site stable. No emesis. Coughing up and spitting out amll amount white thin sputum. Lungs remain clear. 1945 repositioned, back rub. Family leaving for night. Patient calm, cooperative.

## 2018-12-03 NOTE — PROGRESS NOTES
Critical Care Progress Note    Patient:  Gabi Waldron      Unit/Bed:4D-12/012-A    YOB: 1940    MRN: [id number]       Acct: [de-identified]     PCP: James Fernandes MD    Date of Admission: 12/1/2018    Assessment/Plan:    1. Acute Respiratory Failure post EGD at OSH--on VapoTherm at 50% Fi02 and 35L; was intubated 12/1 and extubated 12/2  2. Aspiration Pneumonia (POA) likely 2nd to esophageal food impaction--Zosyn 12/1; Vanc 12/1; appreciate ST input~dental soft with thin liquids (straws ok)  3. Acute GIB--active GI bleeding beginning in the mid descending colon per NM scan; per GI; on PPI BID; planning angiogram mesenteric today  4. Esophageal Food Impaction--resolved; planning EGD per Dr. Isaiah Winter on 12/5  5. Atrial Fib with RVR--on Cardizem bolus and gtt that was started 12/2  6. Acute Blood Loss Anemia--received 1 unit PRBC's on 12/1 for hemoglobin 6.8; recent EGD and colonoscopy reviewed; NM bleeding scan (+) active GI bleeding beginning in the mid descending colon  7. Metabolic acidosis--resolved  8. Hx pulmonary fibrosis--not on home 02  9. Chronic Inflammatory Demyelinating Polyneuropathy --follows with [facility]; on Cellcept 1000 mg BID; Prednisone 2.5 mg daily  10. History of lower esophageal strictures that have been dilated in the remote past  11. Multiple small and large-mouthed diverticula were found in the sigmoid colon  12. Recent DVT S/P IVC filter--OAC's stopped 2nd to GIB 11/20/2018  13.  Medium-sized hiatal hernia    Expected discharge date:  TBD    Disposition:    [] Home       [] TCU       [] Rehab       [] Psych       [x] SNF       [] [place]       [] Other-    Chief Complaint: choked on food    Hospital Course:  pt came to [facility] via direct admit from [facility] 2nd to acute respiratory failure; resides at [facility] where he choked on some chicken; . patient had an EGD to try to retrieve the food impaction and respiratory failure occurred; pt was

## 2018-12-03 NOTE — CARE COORDINATION
12/3/18, 9:33 AM      Mark Dennis       Admitted from: [address] from [facility] 12/1/2018/ [address] day: 2   Location: [facility]12/012-A Reason for admit: Acute respiratory failure ([facility] [address] [J96.00] Status: IP  Admit order signed?: yes  PMH:  has a past medical history of Anemia; COPD (chronic obstructive pulmonary disease) ([facility] [address] DVT (deep venous thrombosis) ([facility] [address] GERD (gastroesophageal reflux disease); High triglycerides; Hx of blood clots; Hypertension; Neuropathy; and Pulmonary fibrosis ([facility] [address] Procedure:   12/1 Intubated  12/1 CVC RIJ  12/1 CXR: Diffuse bilateral alveolar interstitial infiltrates which may represent pulmonary edema or an atypical viral-type pneumonia superimposed on pulmonary fibrosis  12/2 CXR: Stable  12/2 Extubated  12/2 Placed on vapotherm  12/3 Bleeding scan: Study is compatible with active GI bleeding beginning in the mid descending colon  Medications:  Scheduled Meds:   [START ON 12/7/2018] mycophenolate  1,000 mg Oral BID    levofloxacin  500 mg Intravenous Q24H    docusate sodium  100 mg Oral BID    sodium chloride flush  10 mL Intravenous 2 times per day    piperacillin-tazobactam (ZOSYN) 3.375 g in dextrose 5% IVPB extended infusion (mini-bag)  3.375 g Intravenous Q8H    vancomycin (VANCOCIN) intermittent dosing (placeholder)   Other RX Placeholder    vancomycin  1,250 mg Intravenous Q24H    predniSONE  10 mg Oral Daily    calcium replacement protocol   Other RX Placeholder    magnesium replacement protocol   Other RX Placeholder    potassium replacement protocol   Other RX Placeholder    pantoprazole  40 mg Intravenous BID     Continuous Infusions:   diltiazem (CARDIZEM) 125 mg in dextrose 5% 125 mL infusion 10 mg/hr (12/03/18 0901)      Pertinent Info/Orders/Treatment Plan: Presented to [facility] after choking on chicken at [facility] with suspected aspiration pneumonia. Was there 2 days; EGD performed and went into respiratory failure requiring intubation.  Given Rocephin and

## 2018-12-03 NOTE — PROGRESS NOTES
[address] [facility] 4D  Bedside Swallowing Evaluation      SLP Individual Minutes  Time In: 0813  Time Out: 0688  Minutes: 10          Date: 12/3/2018  Patient Name: Shelly Murillo      CSN: [id number]   : 1940  (66 y.o.)  Gender: male   Referring Physician:  RYAN Bragg  Diagnosis: Acute respiratory failure   Secondary Diagnosis: Dysphagia   History of Present Illness/Injury: Directly admitted from [facility] where he presented 2 days ago with esophageal food impaction with suspected aspiration pneumonia.  to assess current swallowing function and determine safest oral diet.    Past Medical History:   Diagnosis Date    Anemia     COPD (chronic obstructive pulmonary disease) ([address]     [facility]     GERD (gastroesophageal reflux disease)     High triglycerides     Hx of blood clots     Hypertension     Neuropathy     Pulmonary fibrosis (HCC)        Pain:  Did not state     Current Diet: NPO- awaiting ST eval     Respiratory Status: [] Independent [x] Nasal Cannula [] Oxygen Mask      [] Tracheostomy [] Other:     [] Ventilator/Settings:    Behavioral Observation: [x] Alert [] Oriented [x] Confused [] Lethargic      [] Dysarthric [] Limited Direction Following [] Agitated      [] Other:    ORAL MECHANISM EVALUATION:         Comments:  Facial / Labial [x]WFL [] Impaired []DNT    Lingual [x]WFL [] Impaired []DNT    Dentition []WFL [x] Impaired []DNT Sparse    Velum []WFL [] Impaired [x]DNT    Vocal Quality [x]WFL [] Impaired []DNT    Sensation []WFL [] Impaired [x]DNT    Cough [x]WFL [] Impaired []DNT    Other: []WFL [] Impaired []DNT    Other: []WFL [] Impaired []DNT        PATIENT WAS EVALUATED USING:  Ice chips, pudding, leslee cracker, thin liquid via cup and straw     ORAL PHASE: [] WFL  [x] Impaired   [] Loss of bolus from lips [] Impaired AP movement [] Pocketing Left   [] Pocketing Right  [] Lingual Pumping  [x] Impaired after all meals  [] Supervision  [] Medication in applesauce []Direct 1:1 Supervision  [] Spoon all liquids [] Alternate solid / liquid [] Limit distractions [] Monitor for fatigue  [] PMV in place for all po [] OTHER:      EDUCATION:   Learner: [x]Patient [] Significant other [] Son/Daughter [] Parent     [] Other:   Education: [x] Reviewed results and recommendations of this evaluation     [x] Reviewed diet and strategies     [] Reviewed signs, symptoms and risk of aspiration     [] Demonstrated how to thick liquid appropriately. [] Reviewed goals and Plan of Care     [] OTHER:   Method: [x] Discussion [] Demonstration [] Hand-out     [] OTHER:   Evaluation of Education:     [x] Verbalizes understanding [] Demonstrates with assistance     [] Demonstrates without assistance [x]Needs further instruction     [] No evidence of learning  [x] Family not present    PATIENT GOALS: [] Pt did not state. Will further assess during treatment. [] Return to the least restricted diet possible     [x] Return to previous level of function     [] OTHER:    PLAN / TREATMENT RECOMMENDATIONS:  [x] Skilled SLP intervention on acute care 3-5 x per week or until goals met and/or pt plateaus in function. Specific interventions for next session may include: diet monitor. SHORT TERM GOALS:  Short-term Goals  Timeframe for Short-term Goals: 2 weeks  Goal 1: Pt will tolerate dental soft and thin liquids (straws ok) with no overt s/s of aspiration to ensure safe and adequate nutrition and hydration  Goal 2: Pt will tolerate advanced trials with no overt s/s of aspiration to return to least restrictive diet  Goal 3: Monitor cognitive status and complete assessment and goals as appropriate      LONG TERM GOALS:  No LTG due to short ELOS.         Mireya Mcconnell Speech Intern   Luis M Fabian M.S. [id number]

## 2018-12-04 PROBLEM — E44.0 MODERATE MALNUTRITION (HCC): Status: ACTIVE | Noted: 2018-01-01

## 2018-12-04 NOTE — PROGRESS NOTES
protocol   Other RX [address], [place]        pantoprazole (PROTONIX) injection 40 mg  40 mg Intravenous BID Jessica Kwan MD   40 mg at 12/04/18 0825     Allergies   Allergen Reactions    Amiodarone Other (See Comments)     Has pulmonary fibrosis    Losartan      Other reaction(s): Unknown Reaction    Tiotropium      Other reaction(s): Unknown Reaction     Active Problems:    Acute respiratory failure (HCC)    Septic shock (HCC)    Aspiration pneumonia (HCC)    Pulmonary fibrosis (HCC)  Resolved Problems:    * No resolved hospital problems. *    Blood pressure (!) 149/77, pulse 88, temperature 98.5 °F (36.9 °C), temperature source Oral, resp. rate 24, height 6' (1.829 m), weight 196 lb 6.4 oz (89.1 kg), SpO2 94 %. Subjective:  Symptoms:  Stable. He reports malaise and weakness. Diet:  Poor intake. Activity level: Impaired due to weakness. Pain:  He reports no pain. Objective:  General Appearance:  Comfortable and in no acute distress. Vital signs: (most recent): Blood pressure (!) 149/77, pulse 88, temperature 98.5 °F (36.9 °C), temperature source Oral, resp. rate 24, height 6' (1.829 m), weight 196 lb 6.4 oz (89.1 kg), SpO2 94 %. Vital signs are normal.    Output: Producing urine and producing stool. HEENT: Normal HEENT exam.    Lungs:  Normal effort and normal respiratory rate. Breath sounds clear to auscultation. Heart: Normal rate. Regular rhythm. S1 normal and S2 normal.    Abdomen: Abdomen is soft. Neurological: Patient is alert and oriented to person, place and time. Normal strength. Assessment:  (Anemia,multifactorial.  Normal ferritin. No need for IV ion. Hold anticoagulation. Monitor H&H.  ).        Melissa Kent MD  12/4/2018

## 2018-12-04 NOTE — PROGRESS NOTES
[address] THERAPY  [facility] ICU 4D  EVALUATION    Time:  Time In: 2673  Time Out: 1443  Timed Code Treatment Minutes: 10 Minutes  Minutes: 25          Date: 2018  Patient Name: Mishel Means,   Gender: male      MRN: [id number]  : 1940  (66 y.o.)  Referring Practitioner: Dr. Estela Fulton  Diagnosis: acute respiratory failure  Additional Pertinent Hx: admit with above diagnosis, esophageal food impacting suspected aspiration pneumonia, dysphagia, GI bleeding, s/p mesenteric angio with embolization on 12-3-18, planning EGD on 18, pt extubated 18, hx chronic inflammatory demyelinating polyneuropathy     Restrictions/Precautions:  General Precautions, Fall Risk, Weight Bearing     Right Lower Extremity Weight Bearing: Non Weight Bearing              Other position/activity restrictions: per spouse recent right fibula fracture around 2 weeks ago and pt is NWB for 6-8 weeks, high flow O2  Right Lower Extremity Brace: Boot    Past Medical History:   Diagnosis Date    Anemia     COPD (chronic obstructive pulmonary disease) ([address]     DVT (deep venous thrombosis) ([address]     GERD (gastroesophageal reflux disease)     High triglycerides     Hx of blood clots     Hypertension     Neuropathy     Pulmonary fibrosis ([address]      Past Surgical History:   Procedure Laterality Date    ABDOMINAL AORTIC ANEURYSM REPAIR      CARDIAC SURGERY      COLONOSCOPY      FIXATION KYPHOPLASTY  10/20/2017    L3    HERNIA REPAIR      x3    KYPHOSIS SURGERY N/A 10/20/2017    KYPHOPLASTY @ L3 LEFT performed by Octavio Amin MD at [address] [place] [address] DX/THER AGNT PARAVERT FACET JOINT, LUMBAR/SAC, 1ST LEVEL Bilateral 2018    Bilateral Lumbar MBB@ L3-4, L4-5 performed by Octavio Amin MD at [address] [place] [address] DX/THER AGNT PARAVERT FACET JOINT, LUMBAR/SAC, 1ST LEVEL Bilateral 10/1/2018    Bilateral L-facet MBB # 2 @ L3-4, L4-5. performed G-codes  Functional Limitation: Self care  Self Care Current Status (): At least 60 percent but less than 80 percent impaired, limited or restricted  Self Care Goal Status ():  At least 60 percent but less than 80 percent impaired, limited or restricted  AM-PAC Inpatient Daily Activity Raw Score: 13  AM-PAC Inpatient ADL T-Scale Score : 32.03  ADL Inpatient CMS 0-100% Score: 63.03  ADL Inpatient CMS G-Code Modifier : CL

## 2018-12-04 NOTE — PROGRESS NOTES
endurance, Decreased ROM, Decreased balance  Assessment: pt tolerated fair, pt unable to assist with attempt at transfer with maxAx2, pt dec balance and generalized weakness/deconditioning with inc assist for all mobility, pt with knight catheter, IVlines, O2, recommend cont PT to inc pt functional mobility  Prognosis: Good    Clinical Presentation: High - Unstable with Unpredictable Characteristics:  :NWB RLE with boot for mobility, pt unable to assist with attempt at transfer with maxAx2, pt dec balance and generalized weakness/deconditioning with inc assist for all mobility, pt with knight catheter, IVlines, O2, recommend cont PT    Decision Making: High Complexitybased on patient assessment and decision making process of determining plan of care and establishing reasonable expectations for measurable functional outcomes    REQUIRES PT FOLLOW UP: Yes    Discharge Recommendations:  Discharge Recommendations: Continue to assess pending progress, Subacute/Skilled Nursing Facility, Patient would benefit from continued therapy after discharge    Patient Education:  Patient Education: POC    Equipment Recommendations:  Equipment Needed: No    Safety:  Type of devices:  All fall risk precautions in place, Call light within reach, Left in bed, Nurse notified, Patient at risk for falls    Plan:  Times per week: 3-5X GM  Times per day: Daily  Specific instructions for Next Treatment: therex, bed mobility, sitting balance, PT to assess transfers  Current Treatment Recommendations: Strengthening, Functional Mobility Training, ROM, Balance Training, Endurance Training, Pain Management, Patient/Caregiver Education & Training, Home Exercise Program, Equipment Evaluation, Education, & procurement, Safety Education & Training    Goals:  Patient goals : plans return to SNF for rehab  Short term goals  Time Frame for Short term goals: 2 weeks  Short term goal 1: rolling L/R with [facility] for pressure relief  Short term goal 2: sidelying to/from sit with modAx1 to get in/out of bed  Short term goal 3: tolerate >10 min dyn sitting balance activity with </=CGA to demonstrate inc trunk strength/control for progression to transfers  Short term goal 4: PT to assess transfers  Long term goals  Time Frame for Long term goals : no LTGs set secondary to short ELOS    Evaluation Complexity: Based on the findings of patient history, examination, clinical presentation, and decision making during this evaluation, the evaluation of Liliya Horvath  is of high complexity. PT G-Codes  Functional Limitation: Mobility: Walking and moving around  Mobility: Walking and Moving Around Current Status (): At least 80 percent but less than 100 percent impaired, limited or restricted  Mobility: Walking and Moving Around Goal Status ():  At least 80 percent but less than 100 percent impaired, limited or restricted       AM-PAC Inpatient Mobility without Stair Climbing Raw Score : 7  AM-PAC Inpatient without Stair Climbing T-Scale Score : 28.66  Mobility Inpatient CMS 0-100% Score: 86.29  Mobility Inpatient without Stair CMS G-Code Modifier : CM

## 2018-12-05 NOTE — PROGRESS NOTES
Therapies:  · Oral Diet Orders: NPO   · Tube Feeding (TF) Orders:   · Feeding Route: Orogastric  · Formula: Renal (Nepro per MD)  · Rate (ml/hr):5ml/hour no increase today per Dr. Laci Cai    · Volume (ml/day): 120ml  · Duration: Continuous  · Water Flushes: per MD  · Current TF & Flush Orders Provides: 216 kcals (359 with diprivan lipids), 9 grams protein, 19 grams cho/24h  · Additional Calories: diprivan at 5.4ml/hour provides 143 lipid kcals  · Anthropometric Measures:  · Ht: 6' (182.9 cm)   · Current Body Wt: 191 lb (86.6 kg) (12/5 with +2 edema)  · Admission Body Wt: 194 lb 7.1 oz (88.2 kg) (12/3 +1 edema)  · Usual Body Wt:  (Per wife 5 years ago pt weighed 235#, but since he was sick has weighed 188-196# & had been maintaing, but thinks he has probably lost weigh recently)  · Ideal Body Wt: 178 lb (80.7 kg), % Ideal Body 107%  · BMI Classification: BMI 25.0 - 29.9 Overweight (26)    Nutrition Interventions:   Continue NPO, Start Tube Feeding  Continued Inpatient Monitoring, Education not appropriate at this time, Coordination of Care    Nutrition Evaluation:   · Evaluation: Progressing toward goals   · Goals: TF to provide % nutrient needs while intubated   · Monitoring: Nutrition Progression, TF Intake, TF Tolerance, Skin Integrity, Weight, Pertinent Labs, Monitor Bowel Function      Electronically signed by Flavia Tamez RD, LD on 12/5/18 at 2:43 PM    Contact Number: [phone]

## 2018-12-05 NOTE — PROGRESS NOTES
docusate sodium (COLACE) capsule 100 mg  100 mg Oral BID Lev Pendleton MD   100 mg at 12/04/18 0825    sodium chloride flush 0.9 % injection 10 mL  10 mL Intravenous 2 times per day Jill Bonilla MD   10 mL at 12/05/18 0908    sodium chloride flush 0.9 % injection 10 mL  10 mL Intravenous PRN Jill Bonilla MD   10 mL at 12/05/18 0541    magnesium hydroxide (MILK OF MAGNESIA) 400 MG/5ML suspension 30 mL  30 mL Oral Daily PRN Jill Bonilla MD        ondansetron [facility]) injection 4 mg  4 mg Intravenous Q6H PRN Jill Bonilla MD   4 mg at 12/03/18 1639    acetaminophen (TYLENOL) 160 MG/5ML solution 650 mg  650 mg Oral Q4H PRN Jill Bonilla MD   650 mg at 12/05/18 0800    melatonin ER tablet 5 mg  5 mg Oral Nightly PRN RYAN Martins   5 mg at 12/03/18 2034    calcium replacement protocol   Other RX Janet Junior MD        magnesium replacement protocol   Other RX Janet Junior MD        potassium replacement protocol   Other RX Janet Junior MD        pantoprazole (PROTONIX) injection 40 mg  40 mg Intravenous BID Lev Pendleton MD   40 mg at 12/05/18 0908     Allergies   Allergen Reactions    Amiodarone Other (See Comments)     Has pulmonary fibrosis    Losartan      Other reaction(s): Unknown Reaction    Tiotropium      Other reaction(s): Unknown Reaction     Active Problems:    Acute respiratory failure (HCC)    Septic shock (HCC)    Aspiration pneumonia (HCC)    Pulmonary fibrosis (HCC)    Moderate malnutrition ([address]  Resolved Problems:    * No resolved hospital problems. *    Blood pressure (!) 92/49, pulse 77, temperature 101.1 °F (38.4 °C), temperature source Rectal, resp. rate 28, height 6' (1.829 m), weight 191 lb 5.8 oz (86.8 kg), SpO2 98 %. Subjective:  Symptoms:  Worsening. He reports shortness of breath. Diet:  NPO. Activity level: Impaired due to weakness. Pain:  He reports no pain.

## 2018-12-05 NOTE — PROCEDURES
ICU PROCEDURE - ENDOTRACHEAL INTUBATION    Nazia Mcneill     MRN#: [id number]  18      Temi [email or web address]     : 1940      INDICATION: Respiratory failure in association with sepsis. TIME OUT: taken    Permission obtained, risks/benefits reviewed:    ANESTHESIA:   [x]Ketamine  []Ativan  [] Morphine  []Propofol  []Other medications:      ESTIMATED BLOOD LOSS:  [x] N/A  [] Other:    COMPLICATIONS:  [x]N/A  [] Other:    LARYNGOSCOPIC AIRWAY GRADE (CORMACK-LEHANE):[]1  [x]2a  []2b []3  []4        INTUBATION EQUIPMENT USED:  [x] Direct laryngoscope only  [] Other:    OUTCOME: Successful placement of # 8.0   Taperguard Evac endotracheal via   [x]Oral route    INSERTION DEPTH:    23  cm from   [x]lip           CONFIRMATION OF TUBE POSITION:   [x]Capnography - Strong & repeatable exhaled CO2 detection   [x]Multiple point auscultation   [x]SpO2 response   [x]STAT X-ray   []Bronchoscopic assessment    UNUSUAL FINDINGS:    PROCEDURE:     Using direct laryngoscopy, the vocal cords were visualized and the endotracheal tube was placed through the cords under direct vision. Good breath sounds were auscultated bilaterally without sounds over abdomen. Appropriate strong & repeatable exhaled CO2 detection was confirmed.        Electronically signed by Leo Malone MD on 2018 at 9:12 AM

## 2018-12-05 NOTE — CARE COORDINATION
case. Plan for EGD w/dilatation and colonoscopy - can be done OP. ICU Understanding Delirium pamphlet given to patient's family.

## 2018-12-05 NOTE — PROGRESS NOTES
treated with immunoglobulin therapy but developed complications with DVTs causing immunoglobulin therapy to stop and subsequent initiation of high-dose prednisone therapy. Patient could not tolerate the adverse effects of prednisone including sensorium, mood and personality changes, he was transitioned to Imuran for steroid sparing. Unfortunately, the patient developed issues with anemia secondary to Imuran therapy. Patient subsequently started on CellCept. Primary presentation for chronic inflammatory demyelinating polyneuropathy with sensory deficits as well as gait disturbance. 9. Anemia: Followed at the [facility] clinic for recurrent anemia. Initially felt secondary to Imuran. Imuran was discontinued, and patient continued to have blood in stools. Colonoscopy showed diverticulosis. No peptic ulcer disease or gastritis noted on endoscopy. Trend hemoglobin. Resume anticoagulation for DVT. 10. COPD: Albuterol when necessary. I am not convinced regarding this diagnosis. 11. Hypertriglyceridemia: Plan to repeat lipid profile after weaned from mechanical ventilator. 12. Hypertension: Currently hypotensive. 13. Ileus versus partial small bowel obstruction: Initiate trickle tube feedings. Continue to monitor for dietary intolerance. 14. Food impaction: Intervention per GI.  15. Aspiration lung injury: On Zosyn for potential aspiration pneumonia. Initial insult was food impaction within the esophagus. 16. Chronic debilitated state: Associated with malnutrition with close to a 90 pound weight loss. This was felt medication derived with improvement in weight after discontinuation of Esbriet. Chronic disease including chronic inflammatory demyelinating polyneuropathy clearly contributing to debilitated state. Pulmonary fibrosis also significantly. With these 2 diseases being a progressive disorder, I am not sure that there is a significant amount of recoverability.   How much of the respiratory

## 2018-12-05 NOTE — OP NOTE
Intravenous, Titrated, Alanna Childs MD, Last Rate: 5.2 mL/hr at 12/05/18 0734, 10 mcg/kg/min at 12/05/18 0734    norepinephrine (LEVOPHED) 16 mg in dextrose 5% 250 mL infusion, 2 mcg/min, Intravenous, Continuous, Alanna Childs MD, Last Rate: 9.4 mL/hr at 12/05/18 0920, 10 mcg/min at 12/05/18 0920    enoxaparin (LOVENOX) injection 90 mg, 1 mg/kg, Subcutaneous, Q12H, Alanna Childs MD    meropenem [facility]) 1 g in sodium chloride 0.9 % 100 mL IVPB (mini-bag), 1 g, Intravenous, Q8H, Alanna Childs MD, Last Rate: 200 mL/hr at 12/05/18 1049, 1 g at 12/05/18 1049    hydrocortisone sodium succinate PF (SOLU-CORTEF) injection 50 mg, 50 mg, Intravenous, Q8H, Alanna Childs MD, 50 mg at 12/05/18 1039    thiamine (B-1) 200 mg in sodium chloride 0.9 % 100 mL IVPB, 200 mg, Intravenous, BID, Alanna Childs MD    ascorbic acid 1,500 mg in sodium chloride 0.9 % 100 mL IVPB, 1,500 mg, Intravenous, Q6H, Alanna Childs MD    fluconazole (DIFLUCAN) in 0.9 % sodium chloride IVPB 400 mg, 400 mg, Intravenous, Q24H, Alanna Childs MD, Last Rate: 100 mL/hr at 12/05/18 1147, 400 mg at 12/05/18 1147    alteplase (CATHFLO) injection 1 mg, 1 mg, Intracatheter, Once, Alanna Childs MD    sulfamethoxazole-trimethoprim (BACTRIM) 433.6 mg in dextrose 5 % 500 mL IVPB, 5 mg/kg, Intravenous, Q8H, Ford Quesada MD    lactated ringers infusion, , Intravenous, Continuous, Alanna Childs MD    metoprolol (LOPRESSOR) injection 5 mg, 5 mg, Intravenous, Q6H PRN, Pete Escobar MD, 5 mg at 12/05/18 0541    docusate sodium (COLACE) capsule 100 mg, 100 mg, Oral, BID, Jessica Downs, MD, 100 mg at 12/04/18 0825    sodium chloride flush 0.9 % injection 10 mL, 10 mL, Intravenous, 2 times per day, Ignacio Hernandez MD, 10 mL at 12/05/18 0908    sodium chloride flush 0.9 % injection 10 mL, 10 mL, Intravenous, PRN, Ignacio Hernandez MD, 10 mL at 12/05/18 0541    magnesium hydroxide (MILK OF MAGNESIA) 400 MG/5ML suspension 30 mL, 30 mL, Oral, and rhythm  []Other:    Lungs:  [x]Clear    []Other:    Abdomen: [x]Soft    []Other:    Mental Status: [x]Alert & Oriented  []Other:      VITAL SIGNS   Patient Vitals for the past 24 hrs:   BP Temp Temp src Pulse Resp SpO2 Weight   12/05/18 1005 (!) 92/49 - - 77 28 98 % -   12/05/18 0950 (!) 91/56 - - 75 30 98 % -   12/05/18 0935 (!) 87/51 - - 78 (!) 31 98 % -   12/05/18 0920 (!) 75/47 - - 79 (!) 31 98 % -   12/05/18 0905 (!) 73/46 - - 81 29 97 % -   12/05/18 0850 (!) 75/45 - - 85 30 97 % -   12/05/18 0835 (!) 90/52 - - 87 (!) 31 96 % -   12/05/18 0820 (!) 82/52 - - 80 (!) 31 97 % -   12/05/18 0805 97/61 101.1 °F (38.4 °C) Rectal 85 (!) 31 98 % -   12/05/18 0748 - - - 87 (!) 39 97 % -   12/05/18 0727 96/72 - - 93 (!) 42 98 % -   12/05/18 0717 (!) 139/53 - - 93 27 96 % -   12/05/18 0707 117/75 - - 94 19 97 % -   12/05/18 0703 - - - 96 25 98 % -   12/05/18 0701 95/62 - - 88 23 98 % -   12/05/18 0603 115/74 - - 88 20 97 % -   12/05/18 0517 - - - - - - 191 lb 5.8 oz (86.8 kg)   12/05/18 0511 - - - - (!) 42 - -   12/05/18 0506 (!) 142/80 - - 108 30 96 % -   12/05/18 0403 121/76 102.4 °F (39.1 °C) Rectal 105 (!) 41 96 % -   12/05/18 0400 - - - 106 - - -   12/05/18 0302 130/74 - - 109 23 96 % -   12/05/18 0203 (!) 142/89 - - 105 (!) 32 94 % -   12/05/18 0102 139/75 - - 93 29 97 % -   12/05/18 0003 (!) 149/79 102.4 °F (39.1 °C) Rectal 84 29 97 % -   12/05/18 0000 - - - 83 - - -   12/04/18 2345 - - - - (!) 45 - -   12/04/18 2302 (!) 143/94 - - 107 (!) 34 97 % -   12/04/18 2225 - - - - (!) 40 - -   12/04/18 2203 (!) 144/77 - - 108 (!) 41 94 % -   12/04/18 2146 (!) 147/76 99.6 °F (37.6 °C) Oral 102 27 94 % -   12/04/18 2142 - - - 104 - - -   12/04/18 1902 (!) 148/67 - - 107 (!) 33 95 % -   12/04/18 1824 - - - - - 92 % -   12/04/18 1802 (!) 150/114 - - 108 24 93 % -   12/04/18 1702 (!) 143/76 99 °F (37.2 °C) Oral 97 (!) 32 95 % -   12/04/18 1602 (!) 145/73 - - 95 (!) 31 94 % -   12/04/18 1502 (!) 149/75 - - 96 (!) 38 93 % -

## 2018-12-06 NOTE — PROGRESS NOTES
stricture. This will require dilatation later. 16. Aspiration lung injury: On Zosyn for potential aspiration pneumonia. Initial insult was food impaction within the esophagus. Antibiotics transitioned to meropenem on 12/5/18. Bronchoscopy completed 12/6/18 verifies gastric content within the airway in addition to developing pneumonia. Patient on meropenem, Bactrim, and Diflucan all of which were initiated 12/5/18. Awaiting analysis of bronchoscopy. Bactrim added for immunosuppressive status and to cover for possible PCP. .  17. Chronic debilitated state: Associated with malnutrition with close to a 90 pound weight loss. This was felt medication derived with improvement in weight after discontinuation of Esbriet. Chronic disease including chronic inflammatory demyelinating polyneuropathy clearly contributing to debilitated state. Pulmonary fibrosis also significantly involved. With these 2 diseases being a progressive disorder, I am not sure that there is a significant amount of recoverability. How much of the respiratory failure will be related to chronic inflammatory demyelinating polyneuropathy and idiopathic pulmonary fibrosis has yet to be determined. 18. Severe protein calorie malnutrition: Nutritional supplementation as tolerated.     CC: Acute respiratory failure  HPI: Patient is a 79-year-old white male reformed smoker. He has a complex past medical history with a diagnosis of idiopathic pulmonary fibrosis with concomitant emphysema. This is managed at the [facility] clinic. His pulmonary function studies show a diminished DLCO, but a preserved FVC and FEV1. The clinical explanation is that the pulmonary fibrosis and the emphysema are sustaining the FEV1/FVC ratio. I do not have total lung capacity to confirm a restrictive defect. However, the FVC is within the normal range. Regardless, patient was given a trial of Esbriet, but could not tolerate the side effects.   Per documentation from [facility].  Lungs: No wheeze. Fine crackles with inspiration. Respiratory rate much improved at 21. Cardiac: RRR  Abdomen: soft. Nontender. Extremities:  No clubbing, cyanosis x 4. Trace lower extremity edema. Vasculature: capillary refill < 3 seconds. Skin:  warm and dry. Psych:  Sedated on mechanical ventilator. Patient is arousable and will follow simple commands. Lymph:  No supraclavicular adenopathy. Neurologic:  No focal deficit. Muscle atrophy without fasciculations.     Data: (All radiographs, tracings, PFTs, and imaging are personally viewed and interpreted unless otherwise noted). · PFTs completed May 2017: FEV1/FVC ratio: 107%. FVC 3.67: Which is 78% predicted. FEV1 is 2.85 L which is 84% predicted. DLCO was 46% predicted. Total lung capacity not performed. · Telemetry shows sinus rhythm. · CT scan abdomen and pelvis suggests high-grade ileus. Possible incomplete bowel obstruction. · Sputum cultures show Candida albicans. · Bronchoscopy completed 12/6/18 showed gastric content within the right lower lobe associated with ongoing mucous production. Findings were consistent with aspiration pneumonia. Dialysis pending. · Sodium 142, potassium 3.5, chloride 105, bicarb 22, BUN 24, creatinine 1.4, glucose 120. Pro-calcitonin 2.32. Count 23.1, hemoglobin 8.3, platelets 316. · IgM 36, IgG 469, IgA 149.     CC time 35 minutes. Time was discontiguous and does not include procedures. Electronically signed by Jose Manuel Denis M.D.

## 2018-12-06 NOTE — PROGRESS NOTES
Nutrition Assessment (Enteral Nutrition)    Type and Reason for Visit: Reassess (TF monitoring)    Nutrition Recommendations: Continue trophic TF as ordered per MD - once has BM recommend increasing 10ml every 4h as tolerated to goal of 50ml/hour or may benefit from change to Vital 1.2 TF goal of 70ml/hour (semi-elemental). Free H20 per MD.     Nutrition Assessment: Pt. Continues with trophic TF due to ileus; bronch today shows aspiration pneumonia; BUN 24, creatinine 1.4 - Nepro per MD;declining from nutritional standpoint AEB TF only at 5ml/hour and continues with ileus and had poor po pta;at risk for further compromise d/t GI status;monitoring need for formula change and ability to advance TF. Malnutrition Assessment:  · Malnutrition Status: Meets the criteria for moderate malnutrition  · Context: Chronic illness  · Findings of the 6 clinical characteristics of malnutrition (Minimum of 2 out of 6 clinical characteristics is required to make the diagnosis of moderate or severe Protein Calorie Malnutrition based on AND/ASPEN Guidelines):  1. Energy Intake-Less than 75% of estimated energy requirement for greater than or equal to 1 month,      2. Muscle Loss-Mild muscle mass loss, Temples (temporalis muscle)    Nutrition Risk Level: High    Nutrition Needs:  · Estimated Daily Total Kcal: ~2117 kcals ( 25 kcals/kg on 12/3 weight of 88.2 kg)  · Estimated Daily Protein (g): ~106 grams ( 1.2 grams protein/kg on 12/3 weight of 88.2 kg)    Nutrition Diagnosis:   · Problem:  Moderate malnutrition  · Etiology: related to Catabolic illness, Insufficient energy/nutrient consumption     Signs and symptoms:  as evidenced by Mild muscle loss (less than 75% of projected energy needs for greater than 1 month)    Objective Information:  · Nutrition-Focused Physical Findings: intubated;BM 0 since admit;CT abdomen showed ileus;colace BID  · Wound Type: Multiple  · Current Nutrition Therapies:  · Oral Diet Orders: NPO

## 2018-12-07 NOTE — PROGRESS NOTES
Diagnosis:   · Problem: Moderate malnutrition  · Etiology: related to Catabolic illness, Insufficient energy/nutrient consumption     Signs and symptoms:  as evidenced by Mild muscle loss (less than 75% of projected energy needs for greater than 1 month)    Objective Information:  · Nutrition-Focused Physical Findings: extubated today, BM 0 since admit, CT abdomen showed ileus, colace BID  · Wound Type: Multiple  · Current Nutrition Therapies:  · Oral Diet Orders: NPO   · Parenteral Nutrition Orders:  · Type and Formula: Premix Central (clinimix 5/15)   · Lipids: None  · Rate/Volume: 75ml/ hour  · Duration: Continuous  · Goal PN Orders Provides: to begin which will provide 1278kcals, 90 grams protein, 270 grams carbohydrate per 24 hours  · Anthropometric Measures:  · Ht: 6' (182.9 cm)   · Current Body Wt: 196 lb 13.9 oz (89.3 kg) (12/7; +1,+2,+3 edema)  · Admission Body Wt: 194 lb 7.1 oz (88.2 kg) (12/3 +1 edema)  · Usual Body Wt:  (Per wife 5 years ago pt weighed 235#, but since he was sick has weighed 188-196# & had been maintaing, but thinks he has probably lost weigh recently)  · Ideal Body Wt: 178 lb (80.7 kg),  · BMI Classification: BMI 25.0 - 29.9 Overweight    Nutrition Interventions:   Continue NPO, Start Parenteral Nutrition  Continued Inpatient Monitoring, Education not appropriate at this time, Coordination of Care    Nutrition Evaluation:   · Evaluation: Goals set   · Goals: Patient will tolerate adequate TPN to meet 75% or more of estimated nutrition needs until appropriate to transition to po feeds during LOS.     · Monitoring: Nutrition Progression, PN Intake, PN Tolerance, Skin Integrity, Weight, Pertinent Labs, Monitor Bowel Function      Electronically signed by Kvng Rushing RD, LD on 12/7/18 at 3:37 PM    Contact Number: [phone]

## 2018-12-07 NOTE — CARE COORDINATION
12/7/18, 2:55 PM      [facility] day: 6  Location: -12/012-A Reason for admit: Acute respiratory failure ([address] [J96.00]   Procedure:   12/1 Intubated  12/1 CVC RIJ  12/1 CXR: Diffuse bilateral alveolar interstitial infiltrates which may represent pulmonary edema or an atypical viral-type pneumonia superimposed on pulmonary fibrosis  12/2 CXR: Stable  12/2 Extubated  12/2 Placed on vapotherm  12/3 Bleeding scan: Study is compatible with active GI bleeding beginning in the mid descending colon  12/4 Mesenteric angio with embolization in IR  12/5 Reintubated  12/5 XR Right foot: Nondisplaced fracture of the proximal phalanx of the right great toe. Regional soft tissue swelling  12/5 CT Abd/pelvis: Dilated small bowel compatible with high-grade ileus or partial small bowel obstruction; large hiatal hernia  12/5 EGD:  Schatzki's ring , 2 cm sliding hiatal hernia , no dilatation done due to OG tube in place. 12/6 Bronch  12/7 Extubated  12/7 KUB: There are persistent mild to moderately dilated segments of small bowel with wall thickening compatible with enteritis. There is a small amount of air within the colon to the rectum. Findings of an incomplete or partial bowel obstruction are again considered. There is redemonstration of an inferior vena cava filter and post vertebroplasty of the L3 spinal level; Impression dilated bowel consistent with an incomplete or partial small bowel obstruction; Superimposed enteritis is not excluded  12/7 CXR:   1. Enteric tube tip overlies the medial right upper abdomen which ejection over the expected location of the gastric antrum/pyloric region. 2. There is worsening airspace disease at the left mid and lower lung zones as well as the right lung base which may represent atelectasis or pneumonia. 3. Bilateral interstitial opacities are demonstrated and appear progressed from the prior examination. This may represent edema and/or fibrotic scarring.      Treatment Plan of Care: Bronch yesterday. Plan for 5 days immunoglobulin therapy - started yesterday. Pt had approx 2L OG output overnight, TF off. Extubated today to 6L O2 with sats 94%. Afebrile. Respiratory culture +yeast, mod growth. SLP/PT/OT. Intensivist, GI, Hematology, and ID following. Telemetry, I&O, daily weight, OG to LI, [facility], SCDs, up with assist. Levo @ 2 mcg/min, IV ascorbic acid, lovenox, IV diflucan, IV solucortef 50 mg Q8H, IV immune globulin daily, IV merrem, protonix, IV thiamine. K+ 3.3, phos 2.1, wbc 20.1, Hgb 7.3. PCP: James Fernandes MD  Readmission Risk Score: 23%  Discharge Plan: Plan to return to [facility]. Precert needed. SW on case. Plan for EGD w/dilatation and colonoscopy - can be done OP.

## 2018-12-07 NOTE — PROGRESS NOTES
Assessment and Plan:        1. Acute on chronic respiratory failure: Chronic medical conditions include pulmonary fibrosis and underlying COPD.  Acute deterioration related to sepsis.  Source of sepsis aspiration pneumonia. patient doing well with spontaneous breathing trial.  Extubate to nasal cannula. Anticipate respiratory rate in the mid 20s to the low 30s. This would be from his interstitial lung disease. Partial bowel obstruction, BiPAP intervention is contraindicated. With active aspiration pneumonia, BiPAP intervention is contraindicated. 2. Hypotension: Patient on chronic steroid therapy.  Increased steroid dosing for adrenal insufficiency.  Pressors as necessary.  Hydrated. Improving. 3. Sepsis syndrome:  secondary to aspiration lung injury. [facility] is chronically immunosuppressed, and is therefore at risk for fungal disease.  Patient currently on Diflucan, and meropenem. Day 3/5 Marik protocol. bronchoscopy consistent with aspiration lung injury including grade 3 lipid laden macrophages and identified gastric content within the airway. If [employer] returns negative on respiratory infection assay, can discontinue meropenem. If cultures remain negative, discontinue meropenem. May represent fungal infection. 4. Renal insufficiency:  Assess how patient responds to hydration. Continue to monitor. 5. Chronic immunosuppression: Increasing steroids secondary to stress and the potential for adrenal insufficiency.  Discontinued CellCept 12/5/18. 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 Day 2/5 immunoglobulin therapy. 6. Recurrent DVT: IVC filter placed November 2018.  Anticoagulation therapy discontinued secondary to significant anemia.  Endoscopy completed in November 2018 did not document ulcer or gastritis.  It did documented diverticulosis, but no associated GI bleeding. Lovenox initiated 12/5/18 in preparation for immunoglobulin therapy.   Benefits of anticoagulation outweigh risks. 7. Idiopathic pulmonary fibrosis: Failed trial of Esbriet.  Clearly progressive symptomatically upon review of notes from the [facility] with significant neuromuscular weakness resulting in tachypnea.  Significant diffusion deficit on PFTs. 8. Chronic inflammatory demyelinating polyneuropathy: This was diagnosed at the [facility] on EMG completed June 2014. Ag Tamayo initially was treated with immunoglobulin therapy but developed complications with DVTs causing immunoglobulin therapy to stop and subsequent initiation of high-dose prednisone therapy.  Patient could not tolerate the adverse effects of prednisone including sensorium, mood and personality changes. He was transitioned to Imuran for steroid sparing.  Unfortunately, the patient developed issues with anemia secondary to Imuran therapy.  Patient subsequently started on CellCept.  Primary presentation for chronic inflammatory demyelinating polyneuropathy with sensory deficits as well as gait disturbance. At this point, I will discontinue the CellCept for aspiration pneumonia. Patient placed on stress dose steroids for sepsis. Anticoagulation initiated in preparation for immunoglobulin therapy. Immunoglobulin therapy initiated 12/6/18: Day #2/5 of immunoglobulin therapy. 9. Hypogammaglobulinemia: Significantly diminished IgG levels, and IgM levels. Secondary to immunosuppressive therapy. Day #2/5 immunoglobulin replacement therapy. 10. Anemia: Followed at the [facility] for recurrent anemia.  Initially felt secondary to Marlise Beams was discontinued, and patient continued to have blood in stools.  Colonoscopy showed diverticulosis.  No peptic ulcer disease or gastritis noted on endoscopy.  Trend hemoglobin.  Resumed anticoagulation for DVT and need for immunoglobulin therapy. 11. COPD: Albuterol when necessary.  I am not convinced regarding this diagnosis.   12. Hypertriglyceridemia: Plan to repeat

## 2018-12-07 NOTE — PROGRESS NOTES
Information:  · Nutrition-Focused Physical Findings: extubated today, BM 0 since admit, CT abdomen showed ileus, colace BID  · Wound Type: Multiple  · Current Nutrition Therapies:  · Oral Diet Orders: NPO   · Anthropometric Measures:  · Ht: 6' (182.9 cm)   · Current Body Wt: 196 lb 13.9 oz (89.3 kg) (12/7; +1,+2,+3 edema)  · Admission Body Wt: 194 lb 7.1 oz (88.2 kg) (12/3 +1 edema)  · Usual Body Wt:  (Per wife 5 years ago pt weighed 235#, but since he was sick has weighed 188-196# & had been maintaing, but thinks he has probably lost weigh recently)  · Ideal Body Wt: 178 lb (80.7 kg),   · BMI Classification: BMI 25.0 - 29.9 Overweight    Nutrition Interventions:   Continue NPO (begin diet when medically feasible)  Continued Inpatient Monitoring, Education not appropriate at this time, Coordination of Care    Nutrition Evaluation:   · Evaluation: Goals set   · Goals: Patient will receive adequate nutrition within 1-4 days.      · Monitoring: Nutrition Progression, Skin Integrity, Weight, Pertinent Labs, Monitor Bowel Function      Electronically signed by Sharon Frias RD, LD on 12/7/18 at 2:06 PM    Contact Number: [phone]

## 2018-12-07 NOTE — PROGRESS NOTES
Patient has been successfully weaned from Mechanical Ventilation. SpO2 of 98 on 30% FiO2. Patient extubated and placed on 6 liters/min via nasal cannula. Post extubation SpO2 is 96% with HR  72 bpm and RR 22 breaths/min. Patient had moderate cough that was productive of clear  sputum. Extubation Well tolerated by patient. Garlon Odor

## 2018-12-08 NOTE — PROGRESS NOTES
Department of Radiology  Post Procedure Progress Note      Pre-Procedure Diagnosis:  Bowel obstruction    Procedure Performed:  NG tube insertion with fluoro    Anesthesia: local     Findings: successful, [id number] ml dark greenish gastric and bowel content   With syringe at time of insertion    Immediate Complications:  None    Estimated Blood Loss: minimal    SEE DICTATED PROCEDURE NOTE FOR COMPLETE DETAILS.     Umberto Drew MD   12/8/2018 10:31 AM

## 2018-12-08 NOTE — PROGRESS NOTES
1820- Multiple attempts made to place NG by myself and JENNIFER Ontiveros. Will give patient a break for a little and try again. 1844- Notified ORLÉANS CNP that NG attempt was unsuccessful, lidocaine viscous ordered to assist next attempt.

## 2018-12-08 NOTE — PROGRESS NOTES
wire  Humidification Temp: 37  Circuit Condensation: Not drained  Oral Care: Lip moisturizer applied  Subglottic Suction Done?: No  Cuff Pressure (cm H2O): 24 cm H2O    SUPPLEMENTAL O2: O2 Flow Rate (L/min): 6 L/min     CURRENT MEDS:   potassium phosphate IVPB  13 mmol Intravenous Once    furosemide  20 mg Intravenous BID    phosphorus replacement protocol   Other RX Placeholder    docusate  100 mg Per NG tube BID    insulin lispro  0-12 Units Subcutaneous Q6H    ketamine  50 mg Intravenous Once    sodium chloride flush  10 mL Intravenous 2 times per day    Immune Globulin 10% IV INFUSION  10 g Intravenous Daily    And    Immune Globulin 10% IV INFUSION  20 g Intravenous Daily    enoxaparin  1 mg/kg Subcutaneous Q12H    meropenem  1 g Intravenous Q8H    hydrocortisone sodium succinate PF  50 mg Intravenous Q8H    thiamine (VITAMIN B1) IVPB  200 mg Intravenous BID    ascorbic acid  1,500 mg Intravenous Q6H    fluconazole  400 mg Intravenous Q24H    alteplase  1 mg Intracatheter Once    calcium replacement protocol   Other RX Placeholder    magnesium replacement protocol   Other RX Placeholder    potassium replacement protocol   Other RX Placeholder    pantoprazole  40 mg Intravenous BID        DRIPS:   PN-Adult Premix 5/15 - Central 75 mL/hr at 12/07/18 1818    dextrose           VITAL SIGNS: BP (!) 157/69   Pulse 96   Temp 98.5 °F (36.9 °C) (Oral)   Resp 29   Ht 6' (1.829 m)   Wt 197 lb 1.5 oz (89.4 kg)   SpO2 99%   BMI 26.73 kg/m²   FiO2 : 6 %    Patient Vitals for the past 8 hrs:   BP Temp Temp src Pulse Resp SpO2 Weight   12/08/18 0803 (!) 157/69 98.5 °F (36.9 °C) Oral 96 29 99 % -   12/08/18 0703 (!) 144/63 - - 102 28 95 % -   12/08/18 0603 137/62 - - 89 27 95 % -   12/08/18 0503 135/64 - - 93 25 92 % -   12/08/18 0403 (!) 154/65 99.8 °F (37.7 °C) Rectal 96 (!) 31 90 % 197 lb 1.5 oz (89.4 kg)   12/08/18 0303 130/72 - - 101 30 92 % -   12/08/18 0203 (!) 140/63 - - 95 26 94 % - 12/08/18 0115 - - - 98 29 91 % -   12/08/18 0110 - - - 102 24 (!) 88 % -   12/08/18 0103 138/65 - - 99 (!) 31 90 % -       PHYSICAL EXAM:   GENERAL: Awake, frail elderly male, alert. Able to talk in short sentences, dyspneic  NECK: No JVD, trachea midline. CARDIOVASCULAR: S1 and S2 normal.  No murmurs, rubs, or gallops. CHEST: Symmetrical chest expansion, has accessory muscle use. LUNGS: fair air entry, bibasal fibrotic type crackles. No rhonchi. ABDOMEN: Soft, nontender, distended and tympanic, distant hypoactive and tinkling bowel sounds. EXTREMITIES: No clubbing. No cyanosis. Dependent edema of lower extremities. Fair peripheral pulse/capillary refill. SKIN: No rashes or ecchymoses, warm to touch. NEUROLOGIC: Nonfocal exam grossly. Alert and oriented x3    Lines Inserted:Central line rt IJ    LABS:  Hematology  Recent Labs      12/06/18 0350 12/07/18   0520  12/08/18   0500   WBC  23.1*  20.1*  23.4*   HCT  27.1*  23.8*  24.8*   PLT  211  244  296     No results for input(s): PROTIME, INR, PTT in the last 72 hours. Chemistries  Recent Labs      12/06/18   0350 12/07/18   0520  12/07/18   1700  12/08/18   0030  12/08/18   0500   NA  142   --   137   --    --   139   K  3.1*   < >  3.3*  3.1*  3.3*  3.5   CL  105   --   103   --    --   104   CO2  22*   --   24   --    --   21*   BUN  24*   --   24*   --    --   23*   CREATININE  1.4*   --   1.2   --    --   1.1    < > = values in this interval not displayed. Recent Labs      12/06/18   0350 12/07/18   0520  12/08/18   0030  12/08/18   0500   CALCIUM  8.6  8.2*   --   8.6   MG  2.0  2.1  2.0  2.0       LFTs  No results for input(s): AST, ALT, ALKPHOS in the last 72 hours. Invalid input(s): BILITOTAL  No results for input(s): AMYLASE, LIPASE in the last 72 hours. Arterial Blood Gasses  No results for input(s): PH, PCO2, PO2 in the last 72 hours.     Invalid input(s): [id number], INSPIREDO2    Cardiac Enzymes  No results for input(s): right-sided central venous line tip is in the right atrium. The heart size is normal.  The mediastinum is not widened. There are persistent peripheral and basilar interstitial opacities in the mid and lower lung zones bilaterally. The appearance is suggestive of worsening fibrosis compared to 2015. Superimposed infiltrates are a consideration. The pulmonary vessels are obscured. No suspicious osseous lesions are present. 1. Appropriately positioned lines and tubes. 2. Background fibrosis with possible superimposed infiltrates. **This report has been created using voice recognition software. It may contain minor errors which are inherent in voice recognition technology. ** Final report electronically signed by Dr. Morelia Elam on 12/5/2018 7:42 AM    Xr Chest Portable    Result Date: 12/5/2018  PROCEDURE: XR CHEST PORTABLE CLINICAL INFORMATION: hypoxia, . COMPARISON: December 4, 2018 TECHNIQUE: AP upright view of the chest. FINDINGS: The heart remains enlarged and partially obscured by diffuse coarse interstitial marking changes. Superimposed areas of airspace opacities suggest pneumonitis or asymmetric edema. A right IJ catheter is again noted within the superior vena cava at the right atrial junction. The skeleton is unchanged. Stable abnormal chest. **This report has been created using voice recognition software. It may contain minor errors which are inherent in voice recognition technology. ** Final report electronically signed by Dr. Jackie Sellers on 12/5/2018 4:32 AM    Xr Chest Portable    Result Date: 12/4/2018  PROCEDURE: XR CHEST PORTABLE CLINICAL INFORMATION: dyspnea and cough, . COMPARISON: December 2, 2018 TECHNIQUE: AP upright view of the chest. FINDINGS: Cardiomegaly is again noted with diffuse interstitial markings. The endotracheal tube and orogastric tube has been removed. Lung volumes are slightly diminished compared to prior study.  Patchy infiltrate again is superimposed upon chronic interstitial changes. Small left effusion is not excluded. Status post removal of endotracheal and nasogastric tube. 2. Decreasing lung volumes. 3. Persistent patchy bilateral infiltrates superimposed on chronic interstitial lung changes. **This report has been created using voice recognition software. It may contain minor errors which are inherent in voice recognition technology. ** Final report electronically signed by Dr. Rishabh Garcia on 12/4/2018 11:24 PM    Xr Chest Portable    Result Date: 12/2/2018  PROCEDURE: XR CHEST PORTABLE CLINICAL INFORMATION: f/u bilateral infiltrates, . COMPARISON: Chest x-ray dated 12/1/2018 TECHNIQUE: AP Portable semiupright chest xray FINDINGS: Lungs/pleura: There is no change in the diffuse bilateral alveolar interstitial infiltrates which are most severe at the bases which may represent pulmonary edema or an atypical pneumonia. No pleural effusion. No pneumothorax. Heart: There is again mild cardiomegaly. Mediastinum/milton: No obvious mass or adenopathy. Skeleton: No significant bone or joint abnormality. Lines/tubes/devices: There is no significant change in the positions of the life support lines and tubes. Stable appearance of the chest compared to the prior study as discussed above. **This report has been created using voice recognition software. It may contain minor errors which are inherent in voice recognition technology. ** Final report electronically signed by Dr. Mari Salcido on 12/2/2018 2:18 AM    Xr Chest Portable    Result Date: 12/1/2018  PROCEDURE: XR CHEST PORTABLE CLINICAL INFORMATION: Central line placement. COMPARISON: 12/1/2018 at 0510 hours. TECHNIQUE: AP portable chest radiograph performed. FINDINGS: POSTSURGICAL CHANGES: None. LINES/TUBES/MECHANICAL DEVICES: 1. The distal tip of the endotracheal tube is 3.8 cm above the level of the marcus. 2. The esophageal tube extends into the stomach and off the inferior aspect of the image.  3. The distal Angiogram Superior Mesenteric    Result Date: 12/3/2018  PROCEDURE: MESENTERIC ANGIOGRAPHY WITH EMBOLIZATION: CLINICAL INFORMATION: Gastrointestinal bleeding. Anemia. . Aorto/bifemoral bypass graft for treatment of aortic aneurysm years ago. This occludes the inferior mesenteric artery. PERFORMED BY:  Dr. Nicolas Wagner MD APPROACH: Right common femoral artery, micropuncture technique. CATHETERS: 5 Western Jackie VCF, 5 Western Jackie SOS. STENTS: None. VESSELS CATHETERIZED: Upper abdominal aorta, superior mesenteric artery, left colic branch fed by spray mesenteric artery. DIAGNOSTIC PROCEDURES: Mesenteric angiography INTERVENTIONS: Embolization left colic artery FLUOROSCOPY TIME: 7.2 minutes FLUOROSCOPIC IMAGES: 53 SEDATION: Versed 1.0mg ; fentanyl 50mcg , IV; the patient was sedated for 45 minutes during this procedure and monitored with EKG and pulse ox monitoring devices by registered nurse. OTHER MEDICATIONS: None. None EMBOLIZATION MATERIALS: Gelfoam slurry CONTRAST: 150 ml, Isovue-300. CLOSURE: Angio-Seal device, successful without complication. TECHNIQUE: Signed informed consent was obtained prior to performing this procedure. The patient was sedated, as indicated above. Access was obtained and a 5 Western Jackie vascular sheath was inserted. The procedures as above were then performed. The abdominal aorta is widely patent. There is an aorto/bifemoral bypass graft with occlusion of the inferior mesenteric artery which fills in a retrograde fashion from collateral branches. This study is severely limited, due to the patient's inability to suspend respirations upon request. The superior mesenteric artery was slightly catheterized. Additional imaging was performed. A selective catheter was advanced into an SMA branch which perfuses the region of the distal descending colon and sigmoid colon imaging at this site revealed a moderate blush although no definite pooling of contrast identified.  This vessel was embolized with Gelfoam replace  8. Chronic immunosuppression: on stress dose steroids. Has potential for adrenal insufficiency.  Discontinued CellCept 12/5/18.  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  Day 3/5 immunoglobulin therapy. 9. Recurrent DVT: IVC filter placed November 2018.  Anticoagulation therapy discontinued secondary to significant anemia.  Endoscopy completed in November 2018 did not document ulcer or gastritis.  It did documented diverticulosis, but no associated GI bleeding. Lovenox initiated 12/5/18 in preparation for immunoglobulin therapy.  Benefits of anticoagulation outweigh risks. 10. Idiopathic pulmonary fibrosis: Failed trial of Esbriet.  Clearly progressive symptomatically upon review of notes from the [facility] with significant neuromuscular weakness resulting in tachypnea.  Significant diffusion deficit on PFTs. Overall prognosis is poor. May need to consider OFEV but GI side effects concerning. 11. Chronic inflammatory demyelinating polyneuropathy (CIDP): This was diagnosed at the [facility] on EMG completed June 2014. Bita Walter initially was treated with immunoglobulin therapy but developed complications with DVTs causing immunoglobulin therapy to stop and subsequent initiation of high-dose prednisone therapy.  Patient could not tolerate the adverse effects of prednisone including sensorium, mood and personality changes.  He was transitioned to Imuran for steroid sparing.  Unfortunately, the patient developed issues with anemia secondary to Imuran therapy.  Patient subsequently started on CellCept.  Primary presentation for chronic inflammatory demyelinating polyneuropathy with sensory deficits as well as gait disturbance.  At this point, CellCept on hold for aspiration pneumonia.  Patient placed on stress dose steroids for sepsis.  Anticoagulation initiated in preparation for immunoglobulin therapy.  Immunoglobulin therapy initiated 12/6/18: Day #3/5 of immunoglobulin therapy. 12. Hypogammaglobulinemia: Significantly diminished IgG levels, and IgM levels.  Secondary to immunosuppressive therapy.  Day #3/5 immunoglobulin replacement therapy. 15. Anemia: Followed at the [facility] for recurrent anemia.  Initially felt secondary to Caity Dung was discontinued, and patient continued to have blood in stools.  Colonoscopy showed diverticulosis.  No peptic ulcer disease or gastritis noted on endoscopy.  Follow hemoglobin.  Resumed anticoagulation for DVT and need for immunoglobulin therapy. 14. COPD: Albuterol when necessary. Unclear regarding this diagnosis. 15. Hypertriglyceridemia: Plan to repeat lipid profile after weaned from mechanical ventilator. 16. Hypertension: Currently hypotensive. 17. Partial small bowel obstruction: Trial of trickle tube feedings resulted in large residual in feeding tube. Transitioning to TPN for nutritional purposes. Appreciate general surgery input. NG to be placed by IR for low intermittent suction. 18. Food impaction: Intervention per GI.  Patient found to have an esophageal stricture and hiatal hernia.  This will require dilatation later. 19. Chronic debilitated state: Associated with malnutrition with close to a 90 pound weight loss. This was felt medication derived with improvement in weight after discontinuation of Esbriet. Chronic disease including chronic inflammatory demyelinating polyneuropathy clearly contributing to debilitated state.  Pulmonary fibrosis also significantly involved. 20. Severe protein calorie malnutrition: Nutritional supplementation as tolerated. Given his partial small bowel obstruction, TPN initiated 12/7/18. Watch for volume overload  21.  Patient is high risk and overall prognosis guarded        GI Prophylaxis: Protonix  DVT Prophylaxis: LMWH full dose    Need to keep No catheter for accurate I/O      Consultants on the case: General surgery, GI, Interventional

## 2018-12-08 NOTE — CONSULTS
[facility]  General Surgery Consult Note  Dr. Trinity Vazquez  Pt Name: iLve Byers  MRN: [id number]  YOB: 1940  Date of evaluation: 12/8/2018  Primary Care Physician: Aleida Mabry MD  Patient evaluated at the request of  Dr. Jessica Dejesus  Reason for evaluation: ? pSBO  IMPRESSIONS:     Active Hospital Problems    Diagnosis Date Noted    Moderate malnutrition ([address] [E44.0] 12/04/2018     Class: Chronic    Pulmonary fibrosis (HCC) [J84.10]     Aspiration pneumonia ([address] [J69.0] 12/01/2018    Septic shock ([address] [A41.9, R65.21]     Acute respiratory failure ([address] [J96.00] 11/30/2018     PLANS:   1. Needs NGT decompression  2. Despite multiple attempts by nursing unable to repass NG tube  3. Recommend either GI evaluation and endoscopic placement or IR evaluation and placement  4. He has known esophageal strictures which GI has seen for but recommended outpatient follow-up for dilation  5. This may be the issue of inability to pass  6. At this point his respiratory status is rather tenuous he has no acute abdominal symptoms  7. Recommend conservative therapy at this time  SUBJECTIVE:   History of Chief Complaint:    Live Byers is a 66 y.o. male who We were asked to see for evaluation of possible partial small bowel obstruction versus ileus. He was a direct admit from [facility] on December 1 and the patient has a chronic demyelinating disorder and he presented there with esophageal food impaction and aspiration pneumonia and he is post upper endoscopy respiratory failure required an intubation. Chest x-ray there suggested aspiration pneumonia he was transferred here. Per the chart history the patient can't offer any's of multiple abdominal surgeries including abdominal aortic aneurysm repair hernia repairs has a big long midline incision.   His also score 7, greater by prolonged ventilation aspiration pneumonia GI bleeding requiring scopes and arteriogram.  He did have a B1) IVPB  200 mg Intravenous BID    ascorbic acid  1,500 mg Intravenous Q6H    fluconazole  400 mg Intravenous Q24H    alteplase  1 mg Intracatheter Once    calcium replacement protocol   Other RX Placeholder    magnesium replacement protocol   Other RX Placeholder    potassium replacement protocol   Other RX Placeholder    pantoprazole  40 mg Intravenous BID     Continuous Infusions:   PN-Adult Premix 5/15 - Central 75 mL/hr at 18 1818    dextrose      norepinephrine Stopped (18 181)     PRN Meds:.glucose, dextrose, glucagon (rDNA), dextrose, sodium chloride flush, acetaminophen, metoprolol, magnesium hydroxide, ondansetron, acetaminophen, melatonin ER  Allergies  Amiodarone; Losartan; and Tiotropium  Family History  Family History   Problem Relation Age of Onset    Heart Disease Mother     Depression Father      Social History  Social History     Social History    Marital status:      Spouse name: N/A    Number of children: N/A    Years of education: N/A     Social History Main Topics    Smoking status: Former Smoker     Years: 35.00     Quit date: 1989    Smokeless tobacco: Never Used    Alcohol use Yes      Comment: rarely    Drug use: No    Sexual activity: Not on file     Other Topics Concern    Not on file     Social History Narrative    No narrative on file     Review of Systems:  Unable to be obtained because of patient's condition  OBJECTIVE:   CURRENT VITALS:  height is 6' (1.829 m) and weight is 197 lb 1.5 oz (89.4 kg). His rectal temperature is 99.8 °F (37.7 °C). His blood pressure is 144/63 (abnormal) and his pulse is 102. His respiration is 28 and oxygen saturation is 95%. Body mass index is 26.73 kg/m².   Temperature Range (24h):Temp: 99.8 °F (37.7 °C) Temp  Av.2 °F (36.8 °C)  Min: 97.2 °F (36.2 °C)  Max: 99.8 °F (37.7 °C)  BP Range (09N): Systolic (52JBA), PSL:701 , Min:78 , E     Diastolic (81BGJ), MEW:59, Min:43, Max:82    Pulse Range (24h): reviewed the following films:  CT scan abd/pelvis: Dilated small bowel and stomach no transition point contrast in the colon hiatal hernia  Abd flat plate: Illeus      Thank you for the evaluation.  Further recommendations above        Electronically signed by Donald Hutchinson MD on 12/8/2018 at 7:40 AM

## 2018-12-09 NOTE — PROGRESS NOTES
Intensive care Unit    Critical Care Medicine Progress Note      Name:  Markel Norwich Date/Time of Admission: 2018  4:46 AM   CSN: [id number] Attending Provider: Cody Winchester MD  Room/Bed: [facility] : 1940 Age: 66 y.o. CHIEF COMPLAINT / HPI / INTERVAL HISTORY:    NG tube re-inserted yesterday by I.R under Fluoro. Had 3775 mls of bilious material suctioned and felt much better with less abdominal distension. Remain on TPN at 75 ml/hr. Placed on Vapotherm yesterday due to low Sp02. Fi02 down to 35% with 20 L bleed in. He is afebrile and WBC slowly trending down. Started on gentle doses of IV lasix and diuresed 3250 mls. BNP was elevated (6410)  K 2.9 and Phos 1.8 this morning. On full dose LMWH. Hb dropped to 7.1 this morning. Bronch wash showed normal luis, budding yeast and pseudohyphae consistent with Candida  Respiratory viral panel negative. CXR  showed coarse reticular lower lobe opacities, elevated hemidiaphragms, cardiomegaly ? Interstitial edema superimposed    INTAKE/OUTPUT:  I/O last 3 completed shifts: In: 2910 [I.V.:2183]  Out: 7025 [Urine:3250; Emesis/NG IBEDJH:2978]  No intake/output data recorded.     FiO2 or VENTILATOR SETTINGS:  Rate Set: 0 bmp  FiO2 : 35 %    Rate: 0 bmp  MODE:    FiO2: 35 %  PEEP:    PAP:      Oxygen Delivery - O2 Flow Rate (L/min): 20 L/min  VENT SETTINGS (Comprehensive)  Vent Information  $Ventilation: $Subsequent Day  Ventilator Started: Yes  Ventilator Stopped: Yes  Ventilation Day(s): 3  Equipment ID: [id number]  Vent Type: C2G5 Irizarry  Vent Mode:  (spontaneous)  Vt Ordered: 0 mL  Pressure Ordered: 0  Rate Set: 0 bmp  Peak Flow: 58.3 L/min  Pressure Support: 12 cmH20  FiO2 : 35 %  Sensitivity: 3  PEEP/CPAP: 6  I Time/ I Time %: 0.8 s  Cuff Pressure (cm H2O): 24 cm H2O  Humidification Source: Heated wire  Humidification Temp: 37  Humidification Temp Measured: 37  Circuit Condensation: Not drained  Nitric Oxide/Epoprostenol In Use?: No  Additional Respiratory  Assessments  Pulse: 88  Resp: 28  SpO2: 94 %  Position: Semi-Webster's  Humidification Source: Heated wire  Humidification Temp: 37  Circuit Condensation: Not drained  Oral Care: Lip moisturizer applied, Mouth suctioned  Subglottic Suction Done?: No  Cuff Pressure (cm H2O): 24 cm H2O    SUPPLEMENTAL O2: O2 Flow Rate (L/min): 20 L/min     CURRENT MEDS:   potassium phosphate IVPB  13 mmol Intravenous Once    furosemide  20 mg Intravenous BID    phosphorus replacement protocol   Other RX Placeholder    docusate  100 mg Per NG tube BID    insulin lispro  0-12 Units Subcutaneous Q6H    ketamine  50 mg Intravenous Once    sodium chloride flush  10 mL Intravenous 2 times per day    Immune Globulin 10% IV INFUSION  10 g Intravenous Daily    And    Immune Globulin 10% IV INFUSION  20 g Intravenous Daily    enoxaparin  1 mg/kg Subcutaneous Q12H    meropenem  1 g Intravenous Q8H    hydrocortisone sodium succinate PF  50 mg Intravenous Q8H    thiamine (VITAMIN B1) IVPB  200 mg Intravenous BID    ascorbic acid  1,500 mg Intravenous Q6H    fluconazole  400 mg Intravenous Q24H    alteplase  1 mg Intracatheter Once    calcium replacement protocol   Other RX Placeholder    magnesium replacement protocol   Other RX Placeholder    potassium replacement protocol   Other RX Placeholder    pantoprazole  40 mg Intravenous BID        DRIPS:   PN-Adult premixed (5/15) solution with Standard electrolytes      PN-Adult Premix 5/15 - Central 75 mL/hr at 12/08/18 1805    dextrose         VITAL SIGNS: /60   Pulse 88   Temp 98.5 °F (36.9 °C) (Oral)   Resp 28   Ht 6' (1.829 m)   Wt 188 lb 11.4 oz (85.6 kg)   SpO2 94%   BMI 25.59 kg/m²   FiO2 : 35 %    Patient Vitals for the past 8 hrs:   BP Temp Temp src Pulse Resp SpO2 Weight   12/09/18 0905 - - - - 28 94 % -   12/09/18 0900 131/60 - - 88 26 95 % -   12/09/18 0800 130/85 98.5 °F (36.9 °C) Oral 89 22 96 % -   12/09/18 0703 (!) 138/58 - - 82 22 94 % - interstitial opacities are seen overlying the visualized lungs which may represent underlying edema or fibrotic scarring. There is also mild right basilar atelectasis or infiltrate noted. There is at least moderate enlargement of the cardiomediastinal silhouette. 1. Enteric tube tip overlies the medial right upper abdomen which ejection over the expected location of the gastric antrum/pyloric region. 2. There is worsening airspace disease at the left mid and lower lung zones as well as the right lung base which may represent atelectasis or pneumonia. 3. Bilateral interstitial opacities are demonstrated and appear progressed from the prior examination. This may represent edema and/or fibrotic scarring. **This report has been created using voice recognition software. It may contain minor errors which are inherent in voice recognition technology. ** Final report electronically signed by Dr. Kenan Vaughan on 12/7/2018 2:05 PM    Xr Chest Portable    Result Date: 12/7/2018  PROCEDURE: XR CHEST PORTABLE CLINICAL INFORMATION: RLL pneumonia, . COMPARISON: December 5, 2018 TECHNIQUE: AP upright view of the chest. FINDINGS: There are diffuse coarse interstitial markings with cardiomegaly again noted. The endotracheal and nasogastric and central venous catheters remain stable in their location. There is no acute change of the skeleton. Chronic interstitial disease again is suspected with superimposed inflammatory pneumonitis or venous congestion. Stable abnormal chest. **This report has been created using voice recognition software. It may contain minor errors which are inherent in voice recognition technology. ** Final report electronically signed by Dr. Yvette Krause on 12/7/2018 5:07 AM    Xr Chest Portable    Result Date: 12/5/2018  PROCEDURE: XR CHEST PORTABLE CLINICAL INFORMATION: ETT/OG placement, . Intubated COMPARISON: Chest x-ray 12/5/2018 at 4:10 AM. 12/1/2018 2/6/2015.  TECHNIQUE: AP supine view of the above the level of the marcus. 2. The esophageal tube extends into the stomach and off the inferior aspect of the image. 3. The distal tip of the right jugular central venous catheter projects over the right atrium. TRACHEA/HEART/MEDIASTINUM/HILUM: 1. There is mild enlargement of the cardiomediastinal silhouette which is partially obscured. LUNG CRYSTAL: 1. There are stable infiltrates scattered throughout both lung fields, most consolidated within the left mid and lower chest and right lower chest. Edema and/or pneumonia should be considered. There is no pleural effusion. Follow-up chest radiographs are  recommended to confirm complete resolution. OTHER: None. PNEUMOTHORAX:  None. OSSEOUS STRUCTURES: 1. No acute osseous abnormality. 1. The distal tip of the endotracheal tube is 3.8 cm above the level of the marcus. 2. The esophageal tube extends into the stomach and off the inferior aspect of the image. 3. The distal tip of the right jugular central venous catheter projects over the right atrium. 4. There are stable infiltrates scattered throughout both lung fields, most consolidated within the left mid and lower chest and right lower chest. Edema and/or pneumonia should be considered. There is no pleural effusion. Follow-up chest radiographs are  recommended to confirm complete resolution. **This report has been created using voice recognition software. It may contain minor errors which are inherent in voice recognition technology. ** Final report electronically signed by Dr. Soniya Gilliam on 12/1/2018 9:15 AM    Xr Chest Portable    Result Date: 12/1/2018  PROCEDURE: XR CHEST PORTABLE CLINICAL INFORMATION: verify vent tube, . COMPARISON: Chest x-ray dated 2/6/2015 TECHNIQUE: AP Portable supine chest xray FINDINGS: Lungs/pleura: There are diffuse bilateral alveolar interstitial infiltrates which may represent pulmonary edema or pneumonia superimposed on underlying pulmonary fibrosis. No pleural effusion.  No deficits as well as gait disturbance.  At this point, CellCept on hold for aspiration pneumonia.  Patient placed on stress dose steroids for sepsis.  Anticoagulation initiated in preparation for immunoglobulin therapy.  Immunoglobulin therapy initiated 12/6/18: Day #4/5 of immunoglobulin therapy. 12. Hypogammaglobulinemia: Significantly diminished IgG levels, and IgM levels.  Secondary to immunosuppressive therapy.  Day #4/5 immunoglobulin replacement therapy. 15. Acute on chronic Anemia: Followed at the [facility] for recurrent anemia.  Initially felt secondary to Frank Midget was discontinued, and patient continued to have blood in stools.  Colonoscopy showed diverticulosis.  No peptic ulcer disease or gastritis noted on endoscopy.  Follow hemoglobin and transfuse if < 7.0.  Resumed anticoagulation for DVT and need for immunoglobulin therapy. 14. COPD: Albuterol when necessary. Unclear regarding this diagnosis. 15. Hypertension: BP improved. Patient not requiring pressors and tolerating lasix for diuresis. 16. Partial small bowel obstruction: Trial of trickle tube feedings resulted in large residual in feeding tube. Placed on TPN for nutritional purposes. Appreciate general surgery input. Gasrograffin study in AM per Dr Shy Kay  17. Food impaction: Intervention per GI.  Patient found to have an esophageal stricture and hiatal hernia.  This will require dilatation later. 18. Chronic debilitated state: Associated with malnutrition with close to a 90 pound weight loss. This was felt medication derived with improvement in weight after discontinuation of Esbriet. Chronic disease including chronic inflammatory demyelinating polyneuropathy clearly contributing to debilitated state.  Pulmonary fibrosis also significantly involved. 19. Severe protein calorie malnutrition: Nutritional supplementation as tolerated. Given his partial small bowel obstruction, TPN initiated 12/7/18.  Watch for volume

## 2018-12-10 NOTE — CARE COORDINATION
Lovenox, IV solucortef 50 mg BID, SSI Q6H, IV micafungin, protonix, Electrolyte replacement protocols. WBC 20.3, Hgb 7.7. PCP: Gayathri Velazquez MD  Readmission Risk Score: 21%  Discharge Plan: Plan to return to [facility]. Precert needed. SW on case. Plan for EGD w/dilatation and colonoscopy - can be done OP.

## 2018-12-10 NOTE — PROGRESS NOTES
Nutrition Assessment (Parenteral Nutrition)    Type and Reason for Visit: Reassess, Consult (TPN macronutreints)    Nutrition Recommendations: Change next bag of TPN to 3:1 dosed on 85 kg , increase to 20 kcals/kg, 1.4 grams protein/kg & 30% lipid kcals. When able to use gut, recommend swallow evaluation prior to po start. Nutrition Assessment:   Pt admitted from outside hospital with aspiration pneumonia, 12/3 + bleeding scan & found to have partial bowel obstruction, reintubated 12/5, extubated 12/7, did not tolerate TF at 5 ml/hr, NG placed by IR 12/8. 5/15 Climinx TPN started 12/7. Glucose 149, WBC 20.3, Hgb 7.7  Py nutritionally compromised d/t unable to use gut, trophic TF intolerance  & at further risk d/t confusion & question ability for swallow when able to use gut. Plan change TPN to 3:1 & increase macronutrients to goal as pt tolerates. Malnutrition Assessment:  · Malnutrition Status: Meets the criteria for moderate malnutrition  · Context: Chronic illness  · Findings of the 6 clinical characteristics of malnutrition (Minimum of 2 out of 6 clinical characteristics is required to make the diagnosis of moderate or severe Protein Calorie Malnutrition based on AND/ASPEN Guidelines):  1. Energy Intake-Less than 75% of estimated energy requirement for greater than or equal to 1 month,      2. Muscle Loss-Mild muscle mass loss, Temples (temporalis muscle)    Nutrition Risk Level: High    Nutrient Needs:  · Estimated Daily Total Kcal: ~2117 kcals ( 25 kcals/kg on 12/3 weight of 88.2 kg)  · Estimated Daily Protein (g): ~106 grams ( 1.2 grams protein/kg on 12/3 weight of 88.2 kg)    Nutrition Diagnosis:   · Problem:  Moderate malnutrition  · Etiology: related to Catabolic illness, Insufficient energy/nutrient consumption     Signs and symptoms:  as evidenced by Mild muscle loss (less than 75% of projected energy needs for greater than 1 month)    Objective Information:  · Nutrition-Focused Physical

## 2018-12-10 NOTE — PROGRESS NOTES
Assessment and Plan:        1. Acute on chronic respiratory failure: Chronic medical conditions include pulmonary fibrosis and underlying COPD.  Acute deterioration related to sepsis.  Source of sepsis aspiration pneumonia.   patient doing well with spontaneous breathing trial.  Extubate to nasal cannula. Anticipate respiratory rate in the mid 20s to the low 30s. This would be from his interstitial lung disease. Partial bowel obstruction, BiPAP intervention is contraindicated. With active aspiration pneumonia, BiPAP intervention is contraindicated. 2. Hypotension: Patient on chronic steroid therapy.  Increased steroid dosing for adrenal insufficiency.  Pressors as necessary.  Hydrated.  Improving. 3. Sepsis syndrome:  secondary to aspiration lung injury. Bita Walter is chronically immunosuppressed, and is therefore at risk for fungal disease.  Patient currently on Diflucan, and meropenem. Day 5/5 Marik protocol.   bronchoscopy consistent with aspiration lung injury including grade 3 lipid laden macrophages and identified gastric content within the airway. Cultures remain negative, discontinued meropenem 12/10/18. May represent fungal infection. Patient transitioned to micafungin on 12/10/18. Now off antibiotics altogether. 4. Renal insufficiency:  Assess how patient responds to hydration.  Continue to monitor. 5. Chronic immunosuppression: Increasing steroids secondary to stress and the potential for adrenal insufficiency.  Discontinued CellCept 12/5/18.  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  Day 5/5 immunoglobulin therapy. 6. Recurrent DVT: IVC filter placed November 2018.  Anticoagulation therapy discontinued secondary to significant anemia.  Endoscopy completed in November 2018 did not document ulcer or gastritis.  It did documented diverticulosis, but no associated GI bleeding.  Lovenox initiated 12/5/18 in preparation for immunoglobulin therapy.  With recurrence tolerated. Given his partial small bowel obstruction, TPN initiated 12/7/18.     CC:  Acute respiratory failure  HPI: Patient is a 77-year-old white male reformed smoker. Mariajose Schrader has a complex past medical history with a diagnosis of idiopathic pulmonary fibrosis with concomitant emphysema.  This is managed at the [facility]. [facility] pulmonary function studies show a diminished DLCO, but a preserved FVC and FEV1.  The clinical explanation is that the pulmonary fibrosis and the emphysema are sustaining the FEV1/FVC ratio.  I do not have total lung capacity to confirm a restrictive defect.  However, the FVC is within the normal range.  Regardless, patient was given a trial of Esbriet, but could not tolerate the side effects.  Per documentation from the [facility], patient is to wear oxygen chronically.  Patient has significant baseline deconditioning with a documented MRC dyspnea score at level III.  Patient also has a history of chronic inflammatory demyelinating polyneuropathy managed at the [facility]. Mariajose Schrader is on chronic prednisone and CellCept as an alternative therapy. Mariajose Schrader was unable to tolerate immunoglobulin therapy in the past secondary to recurrent DVT and had to stop Imuran secondary to issues with recurrent anemia.  Patient has a history of recurrent DVT and was hospitalized in November 2018 at the [facility] with a left femoral and popliteal DVT.  This occurred despite Xarelto therapy. [address] filter placed at that time.  Additionally, patient has a history of hypertriglyceridemia, hypertension, GERD, and chronic low back pain.   Patient was transferred to [facility] on 12/1/18 from an outlying facility where he presented there with esophageal food impaction. Mariajose Schrader underwent EGD to try and advance the food impaction.  Procedure was terminated secondary to respiratory failure requiring intubation. Mariajose Schrader was diagnosed with aspiration pneumonia and started on Rocephin and Zithromax and

## 2018-12-11 NOTE — PROGRESS NOTES
Nutrition Assessment (Parenteral Nutrition)    Type and Reason for Visit: Reassess, Consult (TPN macronutrients)    Nutrition Recommendations: Increase  next bag of TPN to 3:1 dosed on 85 kg , increase to 25 kcals/kg, 1.5 grams protein/kg & 30% lipid kcals. When able to use gut, recommend swallow evaluation prior to po start. Nutrition Assessment:   Pt moderately malnourished d/t mild muscle loss & poor intake. Pt further  nutritionally compromised d/t unable to use gut, trophic TF intolerance, confusion & question ability for swallow when able to use gut. Increase macronutrients in TPN & will adjust as needed. Monitor ability to use gut. Pt admitted from outside hospital with aspiration pneumonia, 12/3 + bleeding scan & found to have partial bowel obstruction, reintubated 12/5, extubated 12/7, did not tolerate TF at 5 ml/hr, NG placed by IR 12/8, & 12/11 SBO. 3:1 TPN infusing, NG to liws. WBC 14.1  Malnutrition Assessment:  · Malnutrition Status: Meets the criteria for moderate malnutrition  · Context: Chronic illness  · Findings of the 6 clinical characteristics of malnutrition (Minimum of 2 out of 6 clinical characteristics is required to make the diagnosis of moderate or severe Protein Calorie Malnutrition based on AND/ASPEN Guidelines):  1. Energy Intake-Less than 75% of estimated energy requirement for greater than or equal to 1 month,      2. Muscle Loss-Mild muscle mass loss, Temples (temporalis muscle)    Nutrition Risk Level: High    Nutrient Needs:  · Estimated Daily Total Kcal: ~2117 kcals ( 25 kcals/kg on 12/3 weight of 88.2 kg)  · Estimated Daily Protein (g): ~106 grams ( 1.2 grams protein/kg on 12/3 weight of 88.2 kg)  Nutrition Diagnosis:   · Problem:  Moderate malnutrition  · Etiology: related to Catabolic illness, Insufficient energy/nutrient consumption     Signs and symptoms:  as evidenced by Mild muscle loss (less than 75% of projected energy needs for greater than 1 month)    Objective Information:  · Nutrition-Focused Physical Findings: NG output= 1260 ml last 24 hours, no bm since admit. 1211 CT of abdomen- small bowel obstruction  · Wound Type: Multiple  · Current Nutrition Therapies:  · Oral Diet Orders: NPO   · Oral Diet intake: NPO  · Oral Nutrition Supplement (ONS) Orders:  (NPO)  · ONS intake: NPO  · Parenteral Nutrition Orders:  · Type and Formula: 3-in-1 Custom (dosed on 85 kg- 20 kcals/kg, 1.4 grams protein/kg & 30% lipid kcals)   · Lipids:  (noen current, willl be in ^PM bag tonight)  · Rate/Volume: 75ml/ hour  · Duration: Continuous  · Current PN Order Provides: 1700 kcals, 119 grams protein, 210 grams CHO/24 hours  · Goal PN Orders Provides: next bag TPN dosed on 85 kg 25 kcals/kg, 1.5 grams protein/kg & 30% lipid kcals to provide 2125 kcals, 127.5 grams protein & 287 grams CHO/24 hours  · Anthropometric Measures:  · Ht: 6' (182.9 cm)   · Current Body Wt: 182 lb 12.2 oz (82.9 kg) (12/11 + 2 edema)  · Admission Body Wt: 188 lb 11.4 oz (85.6 kg) (12/9 +1 +2 edema)  · Usual Body Wt:  (Per wife 5 years ago pt weighed 235#, but since he was sick has weighed 188-196# & had been maintaing, but thinks he has probably lost weigh recently)  · Ideal Body Wt: 178 lb (80.7 kg), % Ideal Body 107%  · BMI Classification: BMI 18.5 - 24.9 Normal Weight (24.8)    Nutrition Interventions:   Continue NPO, Modify current Parenteral Nutrition  Continued Inpatient Monitoring, Education not appropriate at this time, Coordination of Care    Nutrition Evaluation:   · Evaluation: Progressing toward goals   · Goals: Patient will tolerate adequate TPN to meet 75% or more of estimated nutrition needs until appropriate to transition to po feeds during LOS.     · Monitoring: Nutrition Progression, PN Intake, PN Tolerance, Skin Integrity, Weight, Pertinent Labs, Monitor Bowel Function      Electronically signed by Kwasi Burroughs RD, LD on 12/11/18 at 11:55 AM    Contact Number: [phone]

## 2018-12-11 NOTE — PROGRESS NOTES
Sky Robins M.D. FACS  Daily Progress Note    Pt Name: Pal Chung  Medical Record Number: [id number]  Date of Birth 1940   Today's Date: 12/11/2018    ASSESSMENT:     1. HD # 1401 [facility] Problems    Diagnosis Date Noted    Moderate malnutrition ([facility] [E44.0] 12/04/2018     Class: Chronic    Pulmonary fibrosis (HCC) [J84.10]     Aspiration pneumonia ([facility] [J69.0] 12/01/2018    Septic shock ([facility] [A41.9, R65.21]     Acute respiratory failure ([facility] [J96.00] 11/30/2018       Chief Complaint: possible pSBO  No chief complaint on file. PLAN:     1. Cont NGT management  2. WBC lower now off steroids and on antifungals  3. NGT getting less but no GIF  4. Will recheck CT scan with contrast to eval      SUBJECTIVE:     Mcgee Lipoma is stable, WBC lower, some confusion persists, NGT in place and getting lower but nothing out below.       OBJECTIVE:     Patient Vitals for the past 24 hrs:   BP Temp Temp src Pulse Resp SpO2 Weight   12/11/18 0603 (!) 155/72 - - 80 25 94 % -   12/11/18 0503 (!) 150/70 - - 82 28 94 % -   12/11/18 0403 (!) 145/85 98.8 °F (37.1 °C) Oral 90 24 93 % -   12/11/18 0303 (!) 158/72 - - 79 28 94 % -   12/11/18 0203 (!) 162/69 - - 79 25 93 % -   12/11/18 0103 (!) 155/75 - - 80 24 94 % -   12/11/18 0003 (!) 153/71 98.3 °F (36.8 °C) Oral 77 27 94 % 182 lb 12.2 oz (82.9 kg)   12/10/18 2303 (!) 163/80 - - 77 20 94 % -   12/10/18 2203 (!) 164/73 - - 77 27 94 % -   12/10/18 2103 (!) 158/69 - - 85 17 96 % -   12/10/18 2003 (!) 162/66 98.4 °F (36.9 °C) Oral 76 21 96 % -   12/10/18 1900 (!) 143/63 - - 72 28 94 % -   12/10/18 1800 (!) 155/63 - - 71 26 94 % -   12/10/18 1700 (!) 143/83 - - 74 24 94 % -   12/10/18 1600 (!) 148/64 - - 76 20 94 % -   12/10/18 1500 (!) 150/64 98.5 °F (36.9 °C) Oral 76 26 97 % -   12/10/18 1400 139/60 - - 75 27 98 % -   12/10/18 1300 (!) 150/68 - - 80 27 93 % -   12/10/18 1200 (!) 148/73 98.4 °F (36.9 °C) Oral 85 25

## 2018-12-11 NOTE — PROGRESS NOTES
intubation. Aileen Mendoza was diagnosed with aspiration pneumonia and started on Rocephin and Zithromax and subsequently transferred to [address] [place] [address] underwent spontaneous breathing trial on 12/2/18 and was extubated. Aileen Mendoza was transitioned to Zosyn for aspiration pneumonia. Aileen Mendoza developed abdominal discomfort with rebound, and underwent CT scan abdomen and pelvis on 12/4/18.  This showed a high-grade ileus versus partial bowel obstruction.  Patient's respiratory status continued to deteriorate with a respiratory rate in the 40s, and the patient was reintubated on 12/5/18.  He underwent bronchoscopy 12/6/18 which showed gastric content within the right lower lobe as well as a developing pneumonia.  His antibiotics were adjusted on 12/5/18. Isabel Levels was transitioned to meropenem, and patient was started on Bactrim for possible PCP given his risk factors.  Diflucan was added for growth of Candida in the sputum.  Respiratory status significantly improved within the first 24 hours after mechanical ventilator support.  Bronchoscopy stain for PCP was negative and Bactrim was discontinued 12/7/18.  Cultures growing yeast.  Continue with Diflucan.  Patient found to have partial small bowel obstruction requiring NG tube with low intermittent suctioning.  Patient extubated 12/7/18. For further details, please review the assessment and plan. ROS: Patient is confused and oriented to person only. No shortness of breath or mucus production. Cm Alonso PMH:  Per HPI  SHX:  Patient has a 35-pack-year history of smoking and quit in 1989. FHX: Positive for heart disease and depression. Allergies: Amiodarone and losartan.   Medications:        PN-Adult  3 IN 1 Central Line (Custom)      PN-Adult  3 IN 1 Central Line (Custom) 75 mL/hr at 12/10/18 1836    dextrose        micafungin (MYCAMINE) IVPB  150 mg Intravenous Daily    hydrocortisone sodium succinate PF  50 mg Intravenous BID    phosphorus replacement protocol   Other RX Placeholder    docusate  100 mg Per NG tube BID    insulin lispro  0-12 Units Subcutaneous Q6H    sodium chloride flush  10 mL Intravenous 2 times per day    enoxaparin  1 mg/kg Subcutaneous Q12H    alteplase  1 mg Intracatheter Once    calcium replacement protocol   Other RX Placeholder    magnesium replacement protocol   Other RX Placeholder    potassium replacement protocol   Other RX Placeholder    pantoprazole  40 mg Intravenous BID       hydrALAZINE 10 mg Q6H PRN   diatrizoate meglumine-sodium 30 mL ONCE PRN   glucose 15 g PRN   dextrose 12.5 g PRN   glucagon (rDNA) 1 mg PRN   dextrose 100 mL/hr PRN   acetaminophen 650 mg Q4H PRN   ondansetron 4 mg Q6H PRN   acetaminophen 650 mg Q4H PRN        Vital Signs: T: 97.7: P: 88: RR: 31: B/P: 150/94: FiO2: 3 L: O2 Sat: 94%: I/O: 2406/3110  General:   Acutely ill-appearing on chronically ill-appearing white male. HEENT:  normocephalic and atraumatic.  No scleral icterus.  Poor oral dentition. Neck: supple.  No JVD. Lungs: No wheeze.  Fine crackles with inspiration.  Tachypnea at 30. Cardiac: RRR  Abdomen: Significantly increased abdominal distention. Extremities:  No clubbing, cyanosis x 4.  Trace lower extremity edema. Vasculature: capillary refill < 3 seconds. Skin:  warm and dry. Psych:  Obtunded. Lymph:  No supraclavicular adenopathy. Neurologic:  No focal deficit.  Muscle atrophy without fasciculations.     Data: (All radiographs, tracings, PFTs, and imaging are personally viewed and interpreted unless otherwise noted). · PFTs completed May 2017: FEV1/FVC ratio: 107%.  FVC 3.67: Which is 78% predicted. FEV1 is 2.85 L which is 84% predicted.  DLCO was 46% predicted.  Total lung capacity not performed. · Telemetry shows sinus rhythm. · CT scan abdomen and pelvis suggests high-grade ileus.  Possible incomplete bowel obstruction. · Sputum cultures show Candida albicans and subsequently Candida glabrata.   · Bronchoscopy completed 12/6/18 showed

## 2018-12-11 NOTE — CARE COORDINATION
12/11/18, 2:51 PM      [facility] day: 10  Location: 4D-12/012-A Reason for admit: Acute respiratory failure ([address] [J96.00]   Procedure:   12/1 Intubated  12/1 CVC RIJ  12/1 CXR: Diffuse bilateral alveolar interstitial infiltrates which may represent pulmonary edema or an atypical viral-type pneumonia superimposed on pulmonary fibrosis  12/2 CXR: Stable  12/2 Extubated  12/2 Placed on vapotherm  12/3 Bleeding scan: Study is compatible with active GI bleeding beginning in the mid descending colon  12/4 Mesenteric angio with embolization in IR  12/5 Reintubated  12/5 XR Right foot: Nondisplaced fracture of the proximal phalanx of the right great toe. Regional soft tissue swelling  12/5 CT Abd/pelvis: Dilated small bowel compatible with high-grade ileus or partial small bowel obstruction; large hiatal hernia  12/5 EGD:  Schatzki's ring , 2 cm sliding hiatal hernia , no dilatation done due to OG tube in place. 12/6 Bronch  12/7 Extubated  12/7 KUB: There are persistent mild to moderately dilated segments of small bowel with wall thickening compatible with enteritis. There is a small amount of air within the colon to the rectum. Findings of an incomplete or partial bowel obstruction are again considered. There is redemonstration of an inferior vena cava filter and post vertebroplasty of the L3 spinal level; Impression dilated bowel consistent with an incomplete or partial small bowel obstruction; Superimposed enteritis is not excluded  12/7 TPN started. 12/8 Vapotherm started  12/10 Vapotherm stopped  12/11 CT Abd/pelvis: Small bowel obstruction with a transition point in the right upper quadrant mesentery; Thrombus below the patient's IVC filter. Thrombus is present in the inferior vena cava and iliac vessels as described; Indeterminate 1.2 cm left renal lesion. This could represent a dense cyst. An elective ultrasound is recommended.  There is an indeterminate 5 cm right renal lesion attention should be given to this on ultrasound as well; Cholelithiasis  Treatment Plan of Care: NG to LIWS. Wife refusing PT/OT to work with patient at this time - will need reordered when family allows - SW updated. Palliative Care consulted. Afebrile. Sats 94% on 3L O2. Confused - oriented to person only. Respiratory culture +yeast, candida albicans, candida glabrata. SLP/PT/OT. Intensivist, GI, General Surgery, Hematology, and ID following. Telemetry, I&O, daily weight, knight care, SCDs, up with assist. TPN, Lovenox, prn IV hydralazine, IV solucortef 50 mg BID, SSI Q6H, IV micafungin, protonix, Electrolyte replacement protocols. PCP: Vandana Hicks MD  Readmission Risk Score: 20%  Discharge Plan: Plan to return to [facility]. Precert needed. SW on case. Plan for EGD w/dilatation and colonoscopy - can be done OP.     1457 Spoke with Dr. Jeannine Denis and obtained order for Palliative Care consult.

## 2018-12-12 NOTE — ANESTHESIA PRE PROCEDURE
STEPH Jalloh - CNP   10 mg at 12/11/18 2047    PN-Adult  3 IN 1 Central Line (Custom)   Intravenous Continuous TPN Sky Andino [facility] 75 mL/hr at 12/11/18 1754      tobramycin-dexamethasone (TOBRADEX) ophthalmic suspension 1 drop  1 drop Both Eyes TID Leo Malone MD   1 drop at 12/12/18 0852    micafungin (MYCAMINE) 150 mg in sodium chloride 0.9 % 100 mL IVPB  150 mg Intravenous Daily Leo Malone MD   Stopped at 12/12/18 1016    diatrizoate meglumine-sodium (GASTROGRAFIN) 66-10 % solution 30 mL  30 mL Per NG tube ONCE PRN Kevon Lomeli MD   10 mL at 12/08/18 1037    phosphorus replacement protocol   Other RX Placeholder STEPH Juarez CNP        docusate (COLACE) 50 MG/5ML liquid 100 mg  100 mg Per NG tube BID Leo Malone MD   Stopped at 12/10/18 0811    insulin lispro (HUMALOG) injection vial 0-12 Units  0-12 Units Subcutaneous [address] [facility]   2 Units at 12/12/18 0039    glucose (GLUTOSE) 40 % oral gel 15 g  15 g Oral PRN Sky Andino [facility]        dextrose 50 % solution 12.5 g  12.5 g Intravenous PRN Sky Andino, [facility]        glucagon (rDNA) injection 1 mg  1 mg Intramuscular PRN Sky Andino [facility]        dextrose 5 % solution  100 mL/hr Intravenous PRN Sky Andino [facility]        sodium chloride flush 0.9 % injection 10 mL  10 mL Intravenous 2 times per day Leo Malone MD   10 mL at 12/12/18 0853    acetaminophen (TYLENOL) suppository 650 mg  650 mg Rectal Q4H PRN STEPH Juarez CNP   650 mg at 12/05/18 0158    enoxaparin (LOVENOX) injection 90 mg  1 mg/kg Subcutaneous Q12H Leo Malone MD   Stopped at 12/11/18 1051    alteplase (CATHFLO) injection 1 mg  1 mg Intracatheter Once Leo Malone MD        ondansetron [facility]) injection 4 mg  4 mg Intravenous Q6H PRN Adrián Bean MD   4 mg at 12/03/18 6024    acetaminophen (TYLENOL) 160 MG/5ML solution 650 mg  650 mg Oral Q4H PRN Adrián Bean, Hx of blood clots     Hypertension     Neuropathy     Pulmonary fibrosis ([facility]        Past Surgical History:        Procedure Laterality Date    ABDOMINAL AORTIC ANEURYSM REPAIR  2000    BRONCHOSCOPY N/A 12/6/2018    BRONCHOSCOPY ALVEOLAR LAVAGE performed by Pippa Sanchez MD at [facility]      COLONOSCOPY      FIXATION KYPHOPLASTY  10/20/2017    L3    HERNIA REPAIR      x3    KYPHOSIS SURGERY N/A 10/20/2017    KYPHOPLASTY @ L3 LEFT performed by Shanna Aiken MD at [address] [place] [address] DX/THER AGNT PARAVERT FACET JOINT, LUMBAR/SAC, 1ST LEVEL Bilateral 7/23/2018    Bilateral Lumbar MBB@ L3-4, L4-5 performed by Shanna Aiken MD at [address] [place] [address] DX/THER AGNT PARAVERT FACET JOINT, LUMBAR/SAC, 1ST LEVEL Bilateral 10/1/2018    Bilateral L-facet MBB # 2 @ L3-4, L4-5. performed by Shanna Aiken MD at [address] Left 11/13/2018    LUMBAR RFA @ L3-4, L4-5 LEFT SIDE FIRST performed by hSanna Aiken MD at [address] [place] [address] ENDOSCOPY Left 12/5/2018    EGD DIAGNOSTIC ONLY performed by Diana Brown MD at [address] [facility]       Social History:    Social History   Substance Use Topics    Smoking status: Former Smoker     Years: 35.00     Quit date: 1/1/1989    Smokeless tobacco: Never Used    Alcohol use Yes      Comment: rarely                                Counseling given: Not Answered      Vital Signs (Current): There were no vitals filed for this visit.                                            BP Readings from Last 3 Encounters:   12/12/18 (!) 140/90   11/13/18 133/67   11/13/18 (!) 161/70       NPO Status:                                                                                 BMI:   Wt Readings from Last 3 Encounters:   12/12/18 176 lb 5.9 oz (80 kg)   11/13/18 184 lb 12.8 oz (83.8 kg)   10/23/18 187 lb (84.8 kg)     There is no height or

## 2018-12-12 NOTE — PROGRESS NOTES
(less than 75% of projected energy needs for greater than 1 month)    Objective Information:  · Nutrition-Focused Physical Findings: NG output= 1950 ml last 24 hours, SBO, no bm since admit  · Wound Type: Multiple  · Current Nutrition Therapies:  · Oral Diet Orders: NPO   · Oral Diet intake: NPO  · Oral Nutrition Supplement (ONS) Orders:  (NPO)  · ONS intake: NPO  · Parenteral Nutrition Orders:  · Type and Formula: 3-in-1 Custom (dosed on 85 kg, 25 kcals/kg, 1.5 grams protein/kg & 30% lipid kcals)   · Lipids: Daily  · Rate/Volume: 75ml/ hour  · Duration: Continuous  · Current PN Order Provides: 2125 kcals, 127.5 grams protein & 287 grams CHO/24 hours  · next bag TPN dosed on 85 kg 25 kcals/kg, 1.5 grams protein/kg & 30% lipid kcals to provide 2125 kcals, 127.5 grams protein & 287 grams CHO/24 hours  · Anthropometric Measures:  · Ht: 6' (182.9 cm)   · Current Body Wt: 176 lb 5.9 oz (80 kg) (12/12 +2 +3 edema)  · Admission Body Wt: 188 lb 11.4 oz (85.6 kg) (12/9 +1 +2 edema)  · Usual Body Wt:  (Per wife 5 years ago pt weighed 235#, but since he was sick has weighed 188-196# & had been maintaing, but thinks he has probably lost weigh recently)  · Ideal Body Wt: 178 lb (80.7 kg),    · BMI Classification: BMI 18.5 - 24.9 Normal Weight (24)    Nutrition Interventions:   Continue NPO, Continue Parenteral Nutrition  Continued Inpatient Monitoring, Education not appropriate at this time, Coordination of Care    Nutrition Evaluation:   · Evaluation: Progressing toward goals   · Goals: Patient will tolerate adequate TPN to meet 75% or more of estimated nutrition needs until appropriate to transition to po feeds during LOS.     · Monitoring: Nutrition Progression, PN Intake, PN Tolerance, Skin Integrity, Weight, Pertinent Labs, Monitor Bowel Function      Electronically signed by Guillermo Sheikh RD, LD on 12/12/18 at 11:32 AM    Contact Number: [phone]

## 2018-12-12 NOTE — PROGRESS NOTES
Zena Parker M.D. FACS  Daily Progress Note    Pt Name: Carmela Galvez  Medical Record Number: [id number]  Date of Birth 1940   Today's Date: 12/12/2018    ASSESSMENT:     1. HD # [address] Problems    Diagnosis Date Noted    Moderate malnutrition ([facility] [E44.0] 12/04/2018     Class: Chronic    Pulmonary fibrosis (HCC) [J84.10]     Aspiration pneumonia ([facility] [J69.0] 12/01/2018    Septic shock ([facility] [A41.9, R65.21]     Acute respiratory failure ([address] [J96.00] 11/30/2018       Chief Complaint: persistent SBO  No chief complaint on file. PLAN:     1. Cont NGT management  2. NO resolution of SBO, still high output out of NGT  3. Hi surgical risk and poor healing anticipated based on chronic conditions, but this has not resolved with days of NGT decompression  4. Family meeting at 15      SUBJECTIVE:     Meli Burnham is stable, WBC 16.9, some confusion persists, NGT in place and much higher again.       OBJECTIVE:     Patient Vitals for the past 24 hrs:   BP Temp Temp src Pulse Resp SpO2 Weight   12/12/18 0540 - - - - - - 176 lb 5.9 oz (80 kg)   12/12/18 0415 - - - 97 - - -   12/12/18 0403 (!) 141/81 99.1 °F (37.3 °C) Oral 98 21 90 % -   12/12/18 0303 (!) 154/88 - - 106 (!) 37 94 % -   12/12/18 0203 137/78 - - 106 30 93 % -   12/12/18 0103 137/78 - - 109 27 94 % -   12/12/18 0020 - - - 105 - - -   12/12/18 0003 (!) 145/72 99.1 °F (37.3 °C) Oral 104 30 90 % -   12/11/18 2303 (!) 151/77 - - 100 23 93 % -   12/11/18 2203 138/77 - - 100 28 91 % -   12/11/18 2102 137/75 - - 108 (!) 31 92 % -   12/11/18 2003 (!) 163/85 99.4 °F (37.4 °C) Oral 96 27 95 % -   12/11/18 1947 - - - - - (!) 88 % -   12/11/18 1945 - - - 103 - - -   12/11/18 1804 134/69 - - 92 (!) 33 94 % -   12/11/18 1704 (!) 141/100 - - 93 26 93 % -   12/11/18 1604 (!) 141/80 - - 93 26 94 % -   12/11/18 1502 (!) 154/83 - - 90 21 95 % -   12/11/18 1404 (!) 150/94 - - 88 (!) 31 94 % -   12/11/18 1333 - - - 90 (!) 33 94 % -   12/11/18 1305 (!) 160/78 - - 91 25 95 % -   12/11/18 1203 (!) 147/74 97.7 °F (36.5 °C) Oral 95 30 95 % -   12/11/18 1200 - - - 95 - - -   12/11/18 1103 127/82 - - 96 18 93 % -   12/11/18 1002 (!) 142/71 - - 99 24 92 % -   12/11/18 0902 (!) 150/73 - - 88 27 95 % -   12/11/18 0857 - - - - 28 95 % -   12/11/18 0804 (!) 160/79 98 °F (36.7 °C) Oral 87 26 95 % -   12/11/18 0800 - - - 88 - - -         Intake/Output Summary (Last 24 hours) at 12/12/18 0722  Last data filed at 12/12/18 0635   Gross per 24 hour   Intake          2323.84 ml   Output             3850 ml   Net         -1526.16 ml       In: 1299.1 [I.V.:58.1]  Out: 2750 [Urine:1000]    I/O last 3 completed shifts: In: 2323.8 [I.V.:450.8]  Out: 3850 [Urine:1900; Emesis/NG output:1950]       Date 12/12/18 0000 - 12/12/18 2359   Shift 5556-7577 9880-2268 4161-8883 24 Hour Total   I  N  T  A  K  E   P.O.  (mL/kg/hr) 0   0    I.V.  (mL/kg) 28.1  (0.4)   28.1  (0.4)    TPN  (mL/kg) 656  (8.2)   656  (8.2)    Shift Total  (mL/kg) 684.1  (8.6)   684.1  (8.6)   O  U  T  P  U  T   Urine  (mL/kg/hr) 475   475    Emesis/NG output  (mL/kg) 1250  (15.6)   1250  (15.6)    Shift Total  (mL/kg) 1725  (21.6)   1725  (21.6)   Weight (kg) 80 80 80 80       Wt Readings from Last 3 Encounters:   12/12/18 176 lb 5.9 oz (80 kg)   11/13/18 184 lb 12.8 oz (83.8 kg)   10/23/18 187 lb (84.8 kg)        Body mass index is 23.92 kg/m².      Diet: Diet NPO Effective Now  PN-Adult  3 IN 1 Central Line (Custom)        MEDS:     Scheduled Meds:   tobramycin-dexamethasone  1 drop Both Eyes TID    micafungin (MYCAMINE) IVPB  150 mg Intravenous Daily    hydrocortisone sodium succinate PF  50 mg Intravenous BID    phosphorus replacement protocol   Other RX Placeholder    docusate  100 mg Per NG tube BID    insulin lispro  0-12 Units Subcutaneous Q6H    sodium chloride flush  10 mL Intravenous 2 times per day    enoxaparin  1 mg/kg Subcutaneous Q12H    alteplase  1 mg

## 2018-12-12 NOTE — PROGRESS NOTES
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 Electrolyte Replacement: none    TPN changes for (today) at 1800:   Decrease NaCl to 140 mEq  Decrease K acetate to 60 mEq  Remove calcium gluconate    Re-check BMP, Mg, iCa in AM.        Romina Contreras. D., BCPS 12/12/2018 12:45 PM

## 2018-12-12 NOTE — ANESTHESIA PROCEDURE NOTES
Arterial Line:    An arterial line was placed using surface landmarks, in the OR for the following indication(s): continuous blood pressure monitoring and blood sampling needed. A 20 gauge (size), 1 and 3/4 inch (length), Arrow (type) catheter was placed, Seldinger technique used, into the left radial artery, secured by tape and Tegaderm. Anesthesia type: General    Events:  patient tolerated procedure well with no complications.   Anesthesiologist: Salomón aMin  Resident/CRNA: Mavis PADGETT  Performed: Resident/CRNA   Preanesthetic Checklist  Completed: patient identified, site marked, surgical consent, pre-op evaluation, timeout performed, IV checked, risks and benefits discussed, monitors and equipment checked, anesthesia consent given, oxygen available and patient being monitored

## 2018-12-12 NOTE — PROGRESS NOTES
[facility]           Progress Note    Patient Name:  Renea Cantu  Medical Record Number:  [id number]  YOB: 1940    Date:  12/12/2018  Time:  9:23 AM  Completed By:  Georgia Bustillos RN    Reason for Palliative Care Evaluation:  Goals, family support, code status    Advance Directives  [] [facility] DNR Form  [x] Living Will  [x] Medical POA    Current Code Status  [x] Full Resuscitation  [] DNR-Comfort Care-Arrest  [] DNR-Comfort Care  [] Limited   [] No CPR   [] No shock   [] No ET intubation/reintubation   [] No resuscitative medications   [] Other limitation:    Performance Status:  10    Family/Patient Discussion:  Patient resting in bed with eyes closed. Patient has been confused per documentation. Primary RN, Arnaldo Lim at the bedside. Wife, Ibeth Billy at the bedside. Palliative care introduced. Emotional support provided. Wife is receptive to palliative care presence at the noon meeting with Dr. Butch Walker. Plan/Follow-Up:  Palliative care will be present at the noon meeting to assist with support of the family.       Georgia Bustillos RN  12/12/2018,  9:23 AM

## 2018-12-12 NOTE — PROGRESS NOTES
disease including chronic inflammatory demyelinating polyneuropathy clearly contributing to debilitated state.  Pulmonary fibrosis also significantly involved. 20. Severe protein calorie malnutrition: Nutritional supplementation as tolerated.  Given his partial small bowel obstruction, TPN initiated 12/7/18.     CC:  Acute respiratory failure  HPI: Patient is a 70-year-old white male reformed smoker. Dodie Polo has a complex past medical history with a diagnosis of idiopathic pulmonary fibrosis with concomitant emphysema.  This is managed at the [facility]. [facility] pulmonary function studies show a diminished DLCO, but a preserved FVC and FEV1.  The clinical explanation is that the pulmonary fibrosis and the emphysema are sustaining the FEV1/FVC ratio.  I do not have total lung capacity to confirm a restrictive defect.  However, the FVC is within the normal range.  Regardless, patient was given a trial of Esbriet, but could not tolerate the side effects.  Per documentation from the [facility], patient is to wear oxygen chronically.  Patient has significant baseline deconditioning with a documented MRC dyspnea score at level III.  Patient also has a history of chronic inflammatory demyelinating polyneuropathy managed at the [facility]. Dodie Polo is on chronic prednisone and CellCept as an alternative therapy. Dodie Polo was unable to tolerate immunoglobulin therapy in the past secondary to recurrent DVT and had to stop Imuran secondary to issues with recurrent anemia.  Patient has a history of recurrent DVT and was hospitalized in November 2018 at the [facility] with a left femoral and popliteal DVT.  This occurred despite Xarelto therapy. [address] filter placed at that time.  Additionally, patient has a history of hypertriglyceridemia, hypertension, GERD, and chronic low back pain.   Patient was transferred to [address] [place] [address] on 12/1/18 from an outlying facility where he presented there with esophageal food show Candida albicans and subsequently Candida glabrata. · Bronchoscopy completed 12/6/18 showed gastric content within the right lower lobe associated with ongoing mucous production.  Findings were consistent with aspiration pneumonia.   HSV culture positive. PCR for Bordetella, chlamydia, and mycoplasma are negative as well.  Greater than 50 lipid-laden macrophages per high-power field at 120. High degree of reliability for aspiration injury.  Stain for PCP is negative.  Stain for PCP positive for yeast with hyphae.  Cell count with differential: 96% neutrophils with 1% lymphocytes and 3% macrophages.  666 white blood cells.  175 red blood cells. .  · IgM 36, IgG 469, IgA 149. · CT scan abdomen and pelvis 12/11/18 shows small bowel obstruction to the right upper quadrant mesentery. Thrombus below the IVC filter. Cholelithiasis. · BUN 34, creatinine 0.8. Sodium 143, glucose 122. WBC 16.9, Hgb 10, Plt 436     Case discussed with Dr. Rayburn Bernheim. Electronically signed by [place] Patti Lockwood M.D

## 2018-12-12 NOTE — BRIEF OP NOTE
Brief Postoperative Note  ______________________________________________________________    Patient: Pal Chung  YOB: 1940  MRN: [id number]  Date of Procedure: 12/12/2018    Pre-Op Diagnosis: SBO    Post-Op Diagnosis: Same       Procedure(s):  LAPAROTOMY EXPLORATORY, SMALL BOWEL RESECTION MID JEJUNUM OR PROX ILEUM    Anesthesia: General    Surgeon(s):  Crow Mariano MD    Assistant: Munira Kwan    Estimated Blood Loss (mL): less than 50     Complications: None    Specimens:   ID Type Source Tests Collected by Time Destination   A : small intestine Tissue Small Intestine SURGICAL PATHOLOGY Crow Mariano MD 12/12/2018 1321        Implants:  * No implants in log *      Drains:   NG/OG/NJ/NE Tube Nasogastric 16 fr Right nostril (Active)   Surrounding Skin Dry; Intact 12/12/2018 11:22 AM   Securement device Yes 12/12/2018 11:22 AM   Status Suction-low intermittent 12/12/2018 11:22 AM   Placement Verified by Respiratory Status;by External Catheter Length;by Gastric Contents;by Air Bolus 12/12/2018 11:22 AM   Drainage Appearance Brown;Clear 12/12/2018 11:22 AM   Free Water Flush (mL) 0 mL 12/12/2018  8:57 AM   Output (mL) 300 ml 12/12/2018 11:22 AM       Urethral Catheter (Active)   Catheter Indications Need for fluid management in critically ill patients in a critical care setting not able to be managed by other means such as BSC with hat, bedpan, urinal, condom catheter, or short term intermittent urethral catherization 12/12/2018 11:48 AM   Securement Device Date Changed 12/09/18 12/9/2018  4:00 AM   Site Assessment Pink 12/12/2018 11:48 AM   Urine Color Yellow 12/12/2018 11:48 AM   Urine Appearance Clear 12/12/2018 11:48 AM   Output (mL) 300 mL 12/12/2018 12:08 PM       [REMOVED] NG/OG/NJ/NE Tube Orogastric (Removed)   Surrounding Skin Dry; Intact 12/1/2018  3:29 PM   NG/OG/NJ/NE External Measurement (cm) 65 cm 12/1/2018  3:29 PM       [REMOVED] NG/OG/NJ/NE Tube (Removed)   Surrounding Skin

## 2018-12-13 NOTE — PROGRESS NOTES
% -   12/12/18 2303 107/60 - - 104 13 96 % -   12/12/18 2248 108/61 - - 101 13 96 % -   12/12/18 2240 104/63 - - 102 12 96 % -   12/12/18 2233 106/62 - - 102 14 97 % -   12/12/18 2227 114/65 - - 103 13 96 % -   12/12/18 2223 (!) 69/53 - - 104 16 98 % -   12/12/18 2218 (!) 78/54 - - 111 16 97 % -   12/12/18 2217 (!) 80/53 - - 111 17 97 % -   12/12/18 2203 (!) 65/53 - - 114 16 95 % -   12/12/18 2154 (!) 74/52 - - 113 18 95 % -   12/12/18 2149 (!) 73/42 - - 118 15 95 % -   12/12/18 2140 (!) 80/56 - - 118 18 96 % -   12/12/18 2133 (!) 67/49 - - 119 15 96 % -   12/12/18 2116 97/68 - - 115 12 99 % -   12/12/18 2111 (!) 76/57 - - 117 12 96 % -   12/12/18 2103 (!) 83/54 - - 118 12 96 % -   12/12/18 2030 - - - 117 - - -   12/12/18 2015 (!) 87/57 - - 118 14 96 % -   12/12/18 2003 (!) 92/59 102.4 °F (39.1 °C) Oral 122 14 96 % -   12/12/18 1902 (!) 105/59 - - 112 17 99 % -   12/12/18 1832 (!) 76/52 - - 123 23 - -   12/12/18 1802 117/73 - - 129 (!) 32 99 % -   12/12/18 1747 124/76 - - 126 (!) 33 - -   12/12/18 1732 109/71 - - 120 (!) 31 95 % -   12/12/18 1717 105/64 - - 124 26 99 % -   12/12/18 1702 100/65 - - 123 27 97 % -   12/12/18 1647 93/67 98.7 °F (37.1 °C) Oral 123 (!) 40 100 % -   12/12/18 1632 (!) 89/59 - - 128 26 96 % -   12/12/18 1617 97/68 - - 128 (!) 31 96 % -   12/12/18 1602 119/80 - - 132 29 96 % -   12/12/18 1547 103/65 - - 126 24 97 % -   12/12/18 1532 124/73 - - 125 24 95 % -   12/12/18 1517 113/82 - - 118 (!) 31 97 % -   12/12/18 1502 (!) 97/59 - - 114 29 93 % -   12/12/18 1456 99/66 98.4 °F (36.9 °C) Oral 117 26 97 % -   12/12/18 1442 113/73 - - 120 23 96 % -   12/12/18 1428 - - - 131 22 96 % -   12/12/18 1202 (!) 140/90 - - 99 (!) 32 95 % -   12/12/18 1102 (!) 157/92 98.2 °F (36.8 °C) Oral 96 22 96 % -         Intake/Output Summary (Last 24 hours) at 12/13/18 1013  Last data filed at 12/13/18 0850   Gross per 24 hour   Intake             4385 ml   Output             2175 ml   Net             2210 ml

## 2018-12-13 NOTE — OP NOTE
[address] [place] [address]                                OPERATIVE REPORT    PATIENT NAME: Constantino Baker                      :        1940  MED REC NO:   [id number]                           ROOM:       0012  ACCOUNT NO:   [de-identified]                           ADMIT DATE: 2018  PROVIDER:     Cal Deutsch. Nicole Corrales M.D.    [place]:  2018    PREOPERATIVE DIAGNOSIS:  Bowel obstruction. POSTOPERATIVE DIAGNOSIS:  Gangrenous small bowel, mid jejunum, the  proximal ileum with liquefactive necrosis. PROCEDURE PERFORMED:  1. Exploratory laparotomy, lysis of adhesions. 2.  Small bowel resection with primary anastomosis. SURGEON:  Cal Deutsch. Nicole Corrales M.D. ANESTHESIA:  General.    COMPLICATIONS:  None apparent. FINDINGS:  He had nearly his entire abdominal wall replaced by prior  mesh repair, it was making entry difficult. After entering the abdomen,  the omental adhesions of the abdominal wall were freed up and in  elevating these, unroofed basically a gangrenous section of small bowel  with liquefactive necrosis that basically just dissolved. Contents from  inside were spilled. These were controlled with suction. There were no  adhesive bands at the bottom. This appeared to be ischemia, not from an  adhesive band. OPERATIVE PROCEDURE:  The patient was brought to the operating room,  placed supine, SCDs in place. Preoperative antibiotics and Ancef were  given 2 gm. The abdomen was prepped and draped sterilely. Arterial  lines started per Anesthesia. Previous midline incision, we opened the  upper portion above the umbilicus, getting down to the abdominal wall. There was a thick cicatrix of scar and in cutting through this mesh, I  extended the incision above and below, found more mesh below, down below  the umbilicus and the mesh above went all the way sides I went.    Eventually, I had to just

## 2018-12-13 NOTE — PROGRESS NOTES
TPN Follow Up Note    Assessment: s/p laparotomy & small bowel resection  Electrolyte Replacement: none    TPN changes for (today) at 1800: Add Na acetate 50 mEq  Decrease NaCl to 90 mEq  Decrease K acetate to 30 mEq  Add 2.33 mEq Calcium gluconate    Re-check BMP, Mg, PO4, iCa in am        Martha Bai, BCPS 12/13/2018 1:56 PM

## 2018-12-13 NOTE — PROGRESS NOTES
Patient sedated on the ventilator. Wife at the bedside. Wife able to describe findings of surgery. With conversation, wife states, \"I don't want to lose him, but if he passes, he will be in a much better place with no suffering. \"  This statement led to a conversation about code status. Did discuss the likelihood of brain and organ damage and fractured ribs associated with resuscitative measures. Wife wishes to maintain the patient at a full code status and voices understanding of need to inform staff if she or patient wishes to alter the code status. Much emotional support provided. Updated primary RN, Raeanne Hodgkin.

## 2018-12-13 NOTE — PROGRESS NOTES
Assessment and Plan:        1. Acute on chronic respiratory failure: Chronic medical conditions include pulmonary fibrosis and underlying COPD.  Acute deterioration related to sepsis.  Source of sepsis aspiration pneumonia.   patient doing well with spontaneous breathing trial.  Extubated to nasal cannula. On 12/12/18, patient returned from the operating room on mechanical ventilator. He has marginal with spontaneous breathing trial.  Respiratory rate is 34 with a pressure support of 10. Rest on mechanical ventilator today, and anticipate extubation tomorrow. Baseline respiratory rate of 30 is normal for this individual with interstitial lung disease. 2. Hypotension: Patient on chronic steroid therapy.  Increased steroid dosing for adrenal insufficiency.  Pressors as necessary.  Hydrated.  Improving. 3. Sepsis syndrome:  secondary to aspiration lung injury. Danny Devries is chronically immunosuppressed, and is therefore at risk for fungal disease.  Patient currently on Diflucan, and meropenem. Day 5/5 Marik protocol.   bronchoscopy consistent with aspiration lung injury including grade 3 lipid laden macrophages and identified gastric content within the airway.  Cultures remain negative, discontinued meropenem 12/10/18. May represent fungal infection.  Patient transitioned to micafungin on 12/10/18. Subsequently, patient had ischemic bowel with subsequent small bowel gangrene with small bowel obstruction. Meropenem initiated 12/13/18. 4. Renal insufficiency:  Assess how patient responds to hydration.  Continue to monitor. 5. Chronic immunosuppression: Increasing steroids secondary to stress and the potential for adrenal insufficiency.  Discontinued CellCept 12/5/18.  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  S/P 5 days immunoglobulin therapy.   6. Recurrent DVT: IVC filter placed November 2018.  Anticoagulation therapy discontinued secondary to significant anemia.  Endoscopy cultures were negative.  Diflucan converted to micafungin on 12/10/18 with the emergence of Candida glabrata.  As of 12/10/18, patient off antibiotics.    19. Chronic debilitated state: Associated with malnutrition with close to a 90 pound weight loss. This was felt medication derived with improvement in weight after discontinuation of Esbriet. Chronic disease including chronic inflammatory demyelinating polyneuropathy clearly contributing to debilitated state.  Pulmonary fibrosis also significantly involved.   20. Severe protein calorie malnutrition: Nutritional supplementation as tolerated.  Given his partial small bowel obstruction, TPN initiated 12/7/18.     CC:  Acute respiratory failure  HPI: Patient is a 80-year-old white male reformed smoker. Leigh Ann Green has a complex past medical history with a diagnosis of idiopathic pulmonary fibrosis with concomitant emphysema.  This is managed at the [facility]. [facility] pulmonary function studies show a diminished DLCO, but a preserved FVC and FEV1.  The clinical explanation is that the pulmonary fibrosis and the emphysema are sustaining the FEV1/FVC ratio.  I do not have total lung capacity to confirm a restrictive defect.  However, the FVC is within the normal range.  Regardless, patient was given a trial of Esbriet, but could not tolerate the side effects.  Per documentation from the [facility], patient is to wear oxygen chronically.  Patient has significant baseline deconditioning with a documented MRC dyspnea score at level III.  Patient also has a history of chronic inflammatory demyelinating polyneuropathy managed at the [facility]. Leigh Ann Green is on chronic prednisone and CellCept as an alternative therapy. Leigh Ann Green was unable to tolerate immunoglobulin therapy in the past secondary to recurrent DVT and had to stop Imuran secondary to issues with recurrent anemia.  Patient has a history of recurrent DVT and was hospitalized in November 2018 at the [facility] with a left femoral and popliteal DVT.  This occurred despite Xarelto therapy. [address] filter placed at that time.  Additionally, patient has a history of hypertriglyceridemia, hypertension, GERD, and chronic low back pain. Patient was transferred to [facility] on 12/1/18 from an outlying facility where he presented there with esophageal food impaction. Francine Power underwent EGD to try and advance the food impaction.  Procedure was terminated secondary to respiratory failure requiring intubation. Francine Power was diagnosed with aspiration pneumonia and started on Rocephin and Zithromax and subsequently transferred to [address] [place] [address] underwent spontaneous breathing trial on 12/2/18 and was extubated. Francine Power was transitioned to Zosyn for aspiration pneumonia. Francine Power developed abdominal discomfort with rebound, and underwent CT scan abdomen and pelvis on 12/4/18.  This showed a high-grade ileus versus partial bowel obstruction.  Patient's respiratory status continued to deteriorate with a respiratory rate in the 40s, and the patient was reintubated on 12/5/18.  He underwent bronchoscopy 12/6/18 which showed gastric content within the right lower lobe as well as a developing pneumonia.  His antibiotics were adjusted on 12/5/18. Deborra Hoop was transitioned to meropenem, and patient was started on Bactrim for possible PCP given his risk factors.  Diflucan was added for growth of Candida in the sputum.  Respiratory status significantly improved within the first 24 hours after mechanical ventilator support.  Bronchoscopy stain for PCP was negative and Bactrim was discontinued 12/7/18.  Cultures growing yeast.  Continue with Diflucan.  Patient found to have partial small bowel obstruction requiring NG tube with low intermittent suctioning.  Patient extubated 12/7/18. Partial small bowel obstruction did not improve and ultimately patient developed full bowel obstruction.   He underwent surgical intervention on 12/12/18 and 34: B/P: 89/47: FiO2: 40%: O2 Sat: 97%: I/O: 4385/2675  General:   Acutely ill-appearing on chronically ill-appearing white male. HEENT:  normocephalic and atraumatic.  No scleral icterus.  Poor oral dentition. Neck: supple.  No JVD. Lungs: No wheeze.  Fine crackles with inspiration. Cardiac: RRR  Abdomen: Distended. .  Extremities:  No clubbing, cyanosis x 4. Mild upper extremity edema  Vasculature: capillary refill < 3 seconds. Skin:  warm and dry. Psych:  Sedated on mechanical ventilator. Lymph:  No supraclavicular adenopathy. Neurologic:  No focal deficit.  Muscle atrophy without fasciculations.     Data: (All radiographs, tracings, PFTs, and imaging are personally viewed and interpreted unless otherwise noted). · PFTs completed May 2017: FEV1/FVC ratio: 107%.  FVC 3.67: Which is 78% predicted. FEV1 is 2.85 L which is 84% predicted.  DLCO was 46% predicted.  Total lung capacity not performed. · Telemetry shows sinus rhythm. · CT scan abdomen and pelvis suggests high-grade ileus.  Possible incomplete bowel obstruction. · Sputum cultures show Candida albicans and subsequently Candida glabrata. · Bronchoscopy completed 12/6/18 showed gastric content within the right lower lobe associated with ongoing mucous production.  Findings were consistent with aspiration pneumonia.   HSV PCR positive. PCR for Bordetella, chlamydia, and mycoplasma are negative as well.  Greater than 50 lipid-laden macrophages per high-power field at 120. High degree of reliability for aspiration injury.  Stain for PCP is negative.  Stain for PCP positive for yeast with hyphae.  Cell count with differential: 96% neutrophils with 1% lymphocytes and 3% macrophages.  666 white blood cells.  175 red blood cells. .  · IgM 36, IgG 469, IgA 149. · Serum CMV PCR positive. · CT scan abdomen and pelvis 12/11/18 shows small bowel obstruction to the right upper quadrant mesentery.  Thrombus below the IVC filter.  Cholelithiasis.   · Sodium

## 2018-12-14 PROBLEM — J95.821 ACUTE POSTOPERATIVE RESPIRATORY FAILURE (HCC): Status: ACTIVE | Noted: 2018-01-01

## 2018-12-14 NOTE — PROGRESS NOTES
Extubated pt at this time  Placed him on 4 lpm nc  pts nods appropriately to questions  Suctioned white oral secretions he coughed up

## 2018-12-14 NOTE — PROGRESS NOTES
Pharmacy Consult    Recent Labs      12/13/18   0500  12/14/18   0536   CREATININE  1.1  1.5*       Ht/Wt:   Ht Readings from Last 1 Encounters:   12/01/18 6' (1.829 m)        Wt Readings from Last 1 Encounters:   12/13/18 175 lb 4.3 oz (79.5 kg)         Estimated Creatinine Clearance: 45 mL/min (A) (based on SCr of 1.5 mg/dL (H)). Other labs/Notes: SCr increased today    Assessment/Plan:    Will change ganciclovir to 2.5 mg/kg IV Q24H. Thank you for the consult. Will continue to follow.     Karma Castro, PharmD, BCPS  12/14/2018 9:44 AM

## 2018-12-14 NOTE — PROGRESS NOTES
RA by CXR)  [x] Urinary Catheter  [] Arterial Line (Specify Site)  [] Peripheral IV access  [] Other:     ICU PROPHYLAXIS/THERAPY:   Stress ulcer:  [x] PPI Agent  [] H2RA  [] Sucralfate [] Other:   VTE:                [] Enoxaparin    [] Warfarin [] NOAC [x] PCD Device:Bilat LE                          [] Heparin: [] Subcut / [] IV     NUTRITION SUPPORT:Central TPN    DATA:    CBC: Slowly falling Hgb & Hct, elevated platelets - reactive? Recent Labs      12/12/18   0530  12/13/18   0500  12/14/18   0536  12/14/18   1553   WBC  16.9*  26.7*  29.0*   --    RBC  3.65*  3.89*  3.23*   --    HGB  10.0*  10.7*  8.8*  8.2*   HCT  32.9*  35.7*  30.1*  28.0*   MCV  90.1  91.8  93.2   --    MCH  27.4  27.5  27.2   --    MCHC  30.4*  30.0*  29.2*   --    PLT  436*  612*  586*   --    MPV  9.9  10.4  11.1   --       BMP/CMP: Rising creatinine - GABE - Sepsis, hypotension vs other etiology? Mild hyperglycemia  Recent Labs      12/12/18   0530  12/13/18   0500  12/14/18   0536   NA  143  141  146*   K  4.2  4.7  3.9   CL  106  108  111   CO2  27  23  23   ANIONGAP  10.0  10.0  12.0   BUN  34*  53*  66*   CREATININE  0.8  1.1  1.5*   GLUCOSE  130*  197*  175*   CALCIUM  9.3  8.3*  8.6      Other Electrolytes:   Recent Labs      12/12/18   0530  12/13/18   0500  12/14/18   0536   PHOS  2.6  3.4  3.7   MG  2.1  2.0  2.1     ABGs: ABG on VS, FIO2 0.30  Lab Results   Component Value Date    PH 7.43 12/14/2018    PCO2 37 12/14/2018    PO2 110 12/14/2018    HCO3 25 12/14/2018    O2SAT 98 12/14/2018     Lab Results   Component Value Date    IFIO2 30 12/14/2018    MODE VS 12/14/2018    SETPEEP 6.0 12/14/2018       Radiology/Imaging: CVC tip appears to be in RA.  Pulm fibrotic changes, left basilar atelectasis      ID: No new (+) culture - Multiple Candida species in resp material, HSV (+) resp cultures, CMV (+) CMV serum PCR    KEY ISSUES/FINDINGS/DISCUSSION / RECOMMENDATIONS BY SYSTEMS:  SYSTEMS ASSESSMENT AND PLANS     Neuropsychiatric

## 2018-12-15 NOTE — PROGRESS NOTES
1102 139/71 - - 96 17 99 % -   12/15/18 1038 - - - - 22 99 % -   12/15/18 1032 (!) 140/66 - - 96 16 98 % -   12/15/18 1003 139/78 - - 103 (!) 33 96 % -   12/15/18 0933 137/69 - - 99 18 96 % -   12/15/18 0902 139/71 - - 105 22 97 % -   12/15/18 0802 (!) 144/65 98.5 °F (36.9 °C) Axillary 95 18 99 % 179 lb 14.3 oz (81.6 kg)   12/15/18 0800 - - - 95 - - -   12/15/18 0703 133/70 - - 94 18 97 % -   12/15/18 0603 139/66 - - 96 15 98 % -   12/15/18 0503 128/65 - - 95 21 94 % -   12/15/18 0403 (!) 158/76 98.1 °F (36.7 °C) Rectal 99 26 98 % 188 lb 4.4 oz (85.4 kg)       EXAM:  General: No distress. Eyes: PERRL. No sclera icterus. No conjunctival injection. NG tube in place. ENT: No discharge. Pharynx clear. Neck: Trachea midline. Normal thyroid. Resp: No accessory muscle use. Crackles. No wheezing. No dullness on percussion. CV: Regular rate. Regular rhythm. No mumur or rub. No edema. Abd: tender. Clean incision. No masses. No organmegaly. Normal bowel sounds. No hernia. Skin: Warm and dry. No nodule on exposed extremities. No rash on exposed extremities. Lymph: No cervical LAD. No supraclavicular LAD. M/S: No cyanosis. No joint deformity. No clubbing. Neuro: Awake. Follows commands. Normal pain response. Psych: Oriented to person, place, time. No anxiety or agitation. Intake/Output Summary (Last 24 hours) at 12/15/18 1142  Last data filed at 12/15/18 0518   Gross per 24 hour   Intake          2753.96 ml   Output             2070 ml   Net           683.96 ml     I/O last 3 completed shifts:   In: 2754 [I.V.:2754]  Out: 2070 [Urine:1875; Emesis/NG output:195]     Date 12/15/18 0000 - 12/15/18 2359   Shift 7697-8671 0674-3903 9458-2482 24 Hour Total   I  N  T  A  K  E   P.O.  (mL/kg/hr) 0  (0)   0    I.V.  (mL/kg) 823  (9.6)   823  (10.1)    Shift Total  (mL/kg) 823  (9.6)   823  (10.1)   O  U  T  P  U  T   Urine  (mL/kg/hr) 650  (1)   650    Emesis/NG output  (mL/kg) 120  (1.4)   120  (1.5)    Shift stable in their location. There is no acute change of the skeleton. Chronic interstitial disease again is suspected with superimposed inflammatory pneumonitis or venous congestion.     Impression:       Stable abnormal chest.        **This report has been created using voice recognition software. It may contain minor errors which are inherent in voice recognition technology. **    Final report electronically signed by Dr. Declan Sandra on 12/7/2018 5:07 AM     XR CHEST PORTABLE [[id number]] Resulted: 12/05/18 0742     Order Status: Completed Updated: 12/05/18 0744     Narrative:       PROCEDURE: XR CHEST PORTABLE    CLINICAL INFORMATION: ETT/OG placement, .  Intubated    COMPARISON: Chest x-ray 12/5/2018 at 4:10 AM. 12/1/2018 2/6/2015. TECHNIQUE: AP supine view of the chest.    FINDINGS:  The endotracheal tube tip is 5.2 cm above the marcus. An esophageal route tube is in place. The tip is in the stomach. A right-sided central venous line tip is in the right atrium. The heart size is normal.  The mediastinum is not widened.  There are persistent peripheral and basilar interstitial opacities in the mid and lower lung zones bilaterally. The appearance is suggestive of worsening fibrosis compared to 2015. Superimposed   infiltrates are a consideration. The pulmonary vessels are obscured. No suspicious osseous lesions are present.     Impression:       1. Appropriately positioned lines and tubes. 2. Background fibrosis with possible superimposed infiltrates. **This report has been created using voice recognition software. It may contain minor errors which are inherent in voice recognition technology. **    Final report electronically signed by Dr. Marilyn Rhodes on 12/5/2018 7:42 AM     CT ABDOMEN PELVIS WO CONTRAST Additional Contrast? None [[id number]] Resulted: 12/05/18 0527     Order Status: Completed Updated: 12/05/18 0529     Narrative:       PROCEDURE: CT ABDOMEN PELVIS WO CONTRAST    CLINICAL focal area of increased radioactivity seen beginning in the mid to distal third of the ascending colon. This increases on successive imaging and shows progression to the rectum. Findings are compatible with active gastrointestinal bleeding at the site being in the left colon.     Impression:       Study is compatible with active GI bleeding beginning in the mid descending colon. Dr. Porsche Robles was given this information by myself at approximately 12:45 PM        **This report has been created using voice recognition software.  It may contain minor errors which are inherent in voice recognition technology. **    Final report electronically signed by Dr. Yanely Melendrez on 12/3/2018 1:18 PM     NM GI BLOOD LOSS [[id number]]      Order Status: Canceled      XR CHEST PORTABLE [[id number]] Resulted: 12/02/18 0218     Order Status: Completed Updated: 12/02/18 0220     Narrative:       PROCEDURE: XR CHEST PORTABLE    CLINICAL INFORMATION: f/u bilateral infiltrates, . COMPARISON: Chest x-ray dated 12/1/2018    TECHNIQUE: AP Portable semiupright chest xray    FINDINGS:  Lungs/pleura: Castro Kohut is no change in the diffuse bilateral alveolar interstitial infiltrates which are most severe at the bases which may represent pulmonary edema or an atypical pneumonia. No pleural effusion. No pneumothorax. Heart: There is again mild cardiomegaly. Mediastinum/milton: No obvious mass or adenopathy. Skeleton: No significant bone or joint abnormality. Lines/tubes/devices: There is no significant change in the positions of the life support lines and tubes.       Impression:         Stable appearance of the chest compared to the prior study as discussed above. **This report has been created using voice recognition software. It may contain minor errors which are inherent in voice recognition technology. **    Final report electronically signed by Dr. Madalyn Saleh on 12/2/2018 2:18 AM     XR CHEST PORTABLE [[id number]] Resulted: 12/01/18 0915 is a nonobstructive bowel gas pattern, with gas within nondistended small and large bowel.    Free air: There is no obvious pneumoperitoneum.    Miscellaneous: There are no suspicious abdominal calcifications. There are multiple surgical clips over the lower lumbar spine. Skeleton: There is a severe L3 compression fracture with evidence of previous vertebroplasty. Devices: There is an IVC filter to the right of the L2 vertebra.     Impression:       Tip of the NG tube lies in the proximal gastric fundus. Consider advancing the tube 5 to 10 cm. Non-obstructive bowel gas pattern. **This report has been created using voice recognition software. It may contain minor errors which are inherent in voice recognition technology. **    Final report electronically signed by Dr. Narda Little on 12/1/2018 5:34 AM           Patient Active Problem List   Diagnosis    Cerumen impaction    ETD (eustachian tube dysfunction)    Chronic maxillary sinusitis    Deviated nasal septum    Pathologic compression fracture of lumbar vertebra (HCC)    Localized osteoporosis with current pathological fracture    Compression fracture of L3 lumbar vertebra, closed, initial encounter ([address]    Acute deep vein thrombosis (DVT) of distal vein of both lower extremities (HCC)    Benign essential hypertension    Pulmonary emphysema (HCC)    Hypokalemia    Mixed hyperlipidemia    Acute midline low back pain    Chronic pain syndrome    Spondylosis of lumbar region without myelopathy or radiculopathy    Lumbar spondylosis    Spinal stenosis of lumbar region without neurogenic claudication    Acute postoperative respiratory failure ([address]    Septic shock (HCC)    Aspiration pneumonia ([address]    Idiopathic pulmonary fibrosis (HCC)    Moderate malnutrition ([address]    Abdominal distension    Small intestinal gangrene ([address]    Sepsis due to Candida species ([address]       Assessment:    · Acute on chronic respiratory failure: Underlying pulmonary fibrosis. Extubated on 12/14 evening. On 3 LPM   · Septic shock: Resolved. Off pressors. .  · Chronic immunosuppression: On prednisone. · Combined pulmonary fibrosis and emphysema  · Hypernatremia. 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 · Pulmonary HSV. · Hypertriglyceridemia:  · Esophageal stricture. · Nondisplaced fracture of the proximal phalanx of the right great toe. · Severe protein calorie malnutrition    Plan:    · Oxygen support. · Physicl therapy   · Increase activity. · Continue TPN with adjustment for hypernatremia. · Surgery following. · NG tube. · Incision is clean  · Consult to Ortho, not urgent. · Continue meropenem. · Bronchodilators. · Transfer to step down. · Pain control. · Incentive spirometry. · GI prophylaxis. · DVT prophylaxis. I spent over 30 mins of critical time involved in this patient care.     Reviewed with ICU Staff     Seen with multidisciplinary ICU team         Car Schmidt MD

## 2018-12-15 NOTE — PROGRESS NOTES
Paddy Merino  Postoperative Progress Note    Pt Name: Milan LOBO [address] [place] [address] Record Number: [id number]  Date of Birth 1940   Today's Date: 12/15/2018    ASSESSMENT   1. POD # 3 Status post exploratory laparotomy with lysis of adhesions and small bowel resection with primary anastomosis  2. Acute kidney insufficiency  3. Leukocytosis  4. Thrombocytosis  5. Acute blood loss anemia  6. Acute respiratory failure  7. Hypotension  8. Sepsis  9. History recurrent DVT   10. IV antibiotics  11. Chronic respiratory insufficiency with idiopathic pulmonary fibrosis   has a past medical history of Anemia; COPD (chronic obstructive pulmonary disease) (HCC); DVT (deep venous thrombosis) ([facility] GERD (gastroesophageal reflux disease); High triglycerides; Hx of blood clots; Hypertension; Neuropathy; and Pulmonary fibrosis ([facility] PLAN   1. Continue bowel rest.   Await bowel function. 2.  TPN  3. Weaned off pressors  4. IV fluid hydration  5. Pulmonary support. Pt extubated  6. Incisional/wound care. 7. Repeat labs in a.m.  8. DVT prophylaxis. History of IVC filter placement. Patient on therapeutic Lovenox. 9. No signs of active bleeding. Hemoglobin down today. .  Transfuse as needed. May need to stop therapeutic Lovenox if hemoglobin continues to drop. 10. Prognosis guarded. Continue ICU supportive. SUBJECTIVE   Chart reviewed. Updated by nursing staff. Tachycardia overnight but now better. TPN. No bowel function. .   Urinary output adequate per nursing staff. Dressing dry.   No signs of bleeding per nursing or on exam.  Patient  Confused but awake  CURRENT MEDICATIONS   Scheduled Meds:   ganciclovir (CYTOVENE) IVPB  2.5 mg/kg Intravenous Q12H    enoxaparin  1 mg/kg Subcutaneous Q12H    meropenem  1 g Intravenous Q8H    tobramycin-dexamethasone  1 drop Both Eyes TID    micafungin (MYCAMINE) IVPB  150 mg Intravenous Daily    phosphorus replacement protocol   Other RX Placeholder    docusate  100 mg Per NG tube BID    insulin lispro  0-12 Units Subcutaneous Q6H    sodium chloride flush  10 mL Intravenous 2 times per day    alteplase  1 mg Intracatheter Once    calcium replacement protocol   Other RX Placeholder    magnesium replacement protocol   Other RX Placeholder    potassium replacement protocol   Other RX Placeholder    pantoprazole  40 mg Intravenous BID     Continuous Infusions:   PN-Adult  3 IN 1 Central Line (Custom)      PN-Adult  3 IN 1 Central Line (Custom) 75 mL/hr at 18 1732    propofol Stopped (18 1107)    fentaNYL (SUBLIMAZE) 500 mcg in sodium chloride 0.9 % 100 mL 50 mcg/hr (12/15/18 0718)    norepinephrine Stopped (186)    dextrose       PRN Meds:.fentanNYL, hydrALAZINE, diatrizoate meglumine-sodium, glucose, dextrose, glucagon (rDNA), dextrose, acetaminophen, ondansetron, acetaminophen  OBJECTIVE   CURRENT VITALS:  height is 6' (1.829 m) and weight is 179 lb 14.3 oz (81.6 kg). His axillary temperature is 98.5 °F (36.9 °C). His blood pressure is 139/71 and his pulse is 96. His respiration is 17 and oxygen saturation is 99%. Temperature Range (24h):Temp: 98.5 °F (36.9 °C) Temp  Av.8 °F (37.1 °C)  Min: 98.1 °F (36.7 °C)  Max: 100.3 °F (37.9 °C)  BP Range (39S): Systolic (42ZHY), AOP:205 , Min:107 , ZSC:868     Diastolic (09UKZ), SAY:68, Min:55, Max:87    Pulse Range (24h): Pulse  Av  Min: 91  Max: 109  Respiration Range (24h): Resp  Av.6  Min: 11  Max: 33  Current Pulse Ox (24h):  SpO2: 99 %  Pulse Ox Range (24h):  SpO2  Av.1 %  Min: 94 %  Max: 99 %  Oxygen Amount and Delivery: O2 Flow Rate (L/min): 2 L/min  Incentive Spirometry Tx:     Incentive Spirometry Goal (mL):  (unable to perform, fatigued at present)      GENERAL: Intubated and sedated. Resting comfortably.   ET tube in place  LUNGS: clear to ausculation with decreased breath sounds at bases, without wheezes, rales or

## 2018-12-16 PROBLEM — J44.9 CHRONIC OBSTRUCTIVE PULMONARY DISEASE (HCC): Status: ACTIVE | Noted: 2018-01-01

## 2018-12-16 PROBLEM — E83.39 HYPOPHOSPHATEMIA: Status: ACTIVE | Noted: 2018-01-01

## 2018-12-16 NOTE — PROGRESS NOTES
ACCEPTING FOR HOSPITALIST SERVICE          Hospitalist Progress Note    Patient:  Darlin John      Unit/Bed:4K-16/016-A    YOB: 1940    MRN: [id number]       Acct: [de-identified]     PCP: Jefe Corcoran MD    Date of Admission: 12/1/2018    Assessment/Plan:    1. Acute resp failure- aspiration pneumonia- has been on and off vent several x. Hx of pulm fibrosis. Continue rxs, consult pulm as he is not out of the woods. 2. 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 Has hypogammaglobulinemia. Will consult ID re their input  3. S/p operation for sbo; Dr Lola Alcantara following. Npo.  4. Malnutrition- tpn  5. Hx gi bleed- I understand he had coiling. 6. Deconditioning-PT/OT  7. Guarded prognosis- Palliative Care  8. He is confused this am.  Multifactorial.  May get to be a major issue  9. Consider Mookie. 10. Candida in sputum- apparently had course of caspofungin. 11. IVC thrombus going to iliacs- on therapeutic lovenox  12. Chronic prednisone rx- probably should consider low dose steroid replacement      Expected discharge date:  Who knows/    Disposition:    [] Home       [] TCU       [] Rehab       [] Psych       [] SNF       [] [place]       [x] Other-? Chief Complaint: see above, originally transferred due to aspiration pneumonia after egd, needed intubation. Just transferred from [facility]: has been in ICU.   Multiple problems as above     Subjective (past 24 hours): confused, can't get any info       Medications:  Reviewed    Infusion Medications    PN-Adult  3 IN 1 Central Line (Custom) 75 mL/hr at 12/15/18 1822    dextrose       Scheduled Medications    famotidine (PEPCID) injection  20 mg Intravenous Daily    sodium chloride  250 mL Intravenous Once    furosemide  20 mg Intravenous Once    ganciclovir (CYTOVENE) IVPB  2.5 mg/kg Intravenous Q12H    enoxaparin  1 mg/kg Subcutaneous Q12H    meropenem  1 g Intravenous Q8H    Decreasing lung volumes. 3. Persistent patchy bilateral infiltrates superimposed on chronic interstitial lung changes. **This report has been created using voice recognition software. It may contain minor errors which are inherent in voice recognition technology. **      Final report electronically signed by Dr. Joaquin Summers on 12/4/2018 11:24 PM      IR ANGIOGRAM SUPERIOR MESENTERIC   Final Result   1. Very limited study, due to patient's inability to suspend respirations. .    2. Status post embolization of a left colic branch fed by the superior mesenteric artery. .. **This report has been created using voice recognition software. It may contain minor errors which are inherent in voice recognition technology. **                        Final report electronically signed by Dr. Kevon Lomeli on 12/3/2018 5:28 PM      NM GI BLOOD LOSS   Final Result   Study is compatible with active GI bleeding beginning in the mid descending colon. Dr. Ariel Ghosh was given this information by myself at approximately 12:45 PM            **This report has been created using voice recognition software. It may contain minor errors which are inherent in voice recognition technology. **      Final report electronically signed by Dr. Candi Angelo on 12/3/2018 1:18 PM      XR CHEST PORTABLE   Final Result      Stable appearance of the chest compared to the prior study as discussed above. **This report has been created using voice recognition software. It may contain minor errors which are inherent in voice recognition technology. **      Final report electronically signed by Dr. Narda Little on 12/2/2018 2:18 AM      XR CHEST PORTABLE   Final Result   1. The distal tip of the endotracheal tube is 3.8 cm above the level of the marcus. 2. The esophageal tube extends into the stomach and off the inferior aspect of the image.    3. The distal tip of the right jugular central venous catheter projects over the Hypophosphatemia [E83.39] 12/16/2018    Sepsis due to Candida species ([address] [B37.7]     Small intestinal gangrene ([address] [K55.029]     Moderate malnutrition ([address] [E44.0] 12/04/2018     Class: Chronic    Idiopathic pulmonary fibrosis (HCC) [J84.112]     Aspiration pneumonia ([address] [J69.0] 12/01/2018    Septic shock ([address] [A41.9, R65.21]     Acute postoperative respiratory failure ([address] [J95.821] 11/30/2018    Acute deep vein thrombosis (DVT) of distal vein of both lower extremities ([address] [I82.4Z3] 05/22/2018       Electronically signed by Jess Mcarthur MD on 12/16/2018 at 8:50 AM

## 2018-12-16 NOTE — PROGRESS NOTES
TPN Follow Up Note    Assessment: NPO order continues  Electrolyte Replacement: KPhos 13 mmol IV    TPN changes for (today) at 1800:    Increase K Phos to 15 mmol     Re-check BMP, Mg, PO4, iCa 12/17/18 am     Linnea Cobb PharmD, BCPS   12/16/2018  9:57 AM

## 2018-12-16 NOTE — PROGRESS NOTES
kg/m²     Intake/Output Summary (Last 24 hours) at 12/16/18 1343  Last data filed at 12/16/18 7919   Gross per 24 hour   Intake          2530.48 ml   Output             2450 ml   Net            80.48 ml     General appearance: alert and cooperative with exam  Lungs: clear to auscultation bilaterally  Heart: regular rate and rhythm, S1, S2 normal, no murmur, click, rub or gallop  Abdomen: tender  Extremities: extremities normal, atraumatic, no cyanosis or edema    Assessment and Plan:   1. Bowel ischemia s/p resection , GERD.       Follow up in GI Clinic after discharge in 2 week(s)    Patient Active Problem List:     Cerumen impaction     ETD (eustachian tube dysfunction)     Chronic maxillary sinusitis     Deviated nasal septum     Pathologic compression fracture of lumbar vertebra (HCC)     Localized osteoporosis with current pathological fracture     Compression fracture of L3 lumbar vertebra, closed, initial encounter ([address]     Acute deep vein thrombosis (DVT) of distal vein of both lower extremities (HCC)     Benign essential hypertension     Chronic obstructive pulmonary disease (HCC)     Hypokalemia     Mixed hyperlipidemia     Acute midline low back pain     Chronic pain syndrome     Spondylosis of lumbar region without myelopathy or radiculopathy     Lumbar spondylosis     Spinal stenosis of lumbar region without neurogenic claudication     Acute postoperative respiratory failure (HCC)     Septic shock (HCC)     Aspiration pneumonia (HCC)     Idiopathic pulmonary fibrosis (HCC)     Moderate malnutrition (HCC)     Abdominal distension     Small intestinal gangrene ([address]     Sepsis due to Candida species ([address]     Hypophosphatemia      Terri Burton MD

## 2018-12-16 NOTE — PROGRESS NOTES
MD [place] at [facility] DX/THER AGNT PARAVERT FACET JOINT, LUMBAR/SAC, 1ST LEVEL Bilateral 10/1/2018    Bilateral L-facet MBB # 2 @ L3-4, L4-5. performed by Nikko Whitley MD at [address] Left 11/13/2018    LUMBAR RFA @ L3-4, L4-5 LEFT SIDE FIRST performed by Nikko Whitley MD at [address] [facility] ENDOSCOPY Left 12/5/2018    EGD DIAGNOSTIC ONLY performed by Myrna Garcia MD at [facility] End[facility]     Meds    Current Medications    famotidine (PEPCID) injection  20 mg Intravenous Daily    sodium chloride  250 mL Intravenous Once    furosemide  20 mg Intravenous Once    potassium phosphate IVPB  13 mmol Intravenous Once    ganciclovir (CYTOVENE) IVPB  5 mg/kg Intravenous Q12H    enoxaparin  1 mg/kg Subcutaneous Q12H    meropenem  1 g Intravenous Q8H    tobramycin-dexamethasone  1 drop Both Eyes TID    insulin lispro  0-12 Units Subcutaneous Q6H    sodium chloride flush  10 mL Intravenous 2 times per day    magnesium replacement protocol   Other RX Placeholder    potassium replacement protocol   Other RX Placeholder     fentanNYL, hydrALAZINE, glucose, dextrose, glucagon (rDNA), dextrose, ondansetron, acetaminophen  IV Drips/Infusions   PN-Adult  3 IN 1 Central Line (Custom)      PN-Adult  3 IN 1 Central Line (Custom) 75 mL/hr at 12/15/18 1822    dextrose       Home Medications  Prescriptions Prior to Admission: Benzocaine-Menthol (CEPACOL) 6-10 MG LOZG lozenge, Take 1 lozenge by mouth every 2 hours as needed for Sore Throat  melatonin 5 MG TABS tablet, Take 5 mg by mouth daily as needed  magnesium hydroxide (MILK OF MAGNESIA) 400 MG/5ML suspension, Take 30 mLs by mouth daily as needed for Constipation  HYDROcodone-acetaminophen (NORCO) 5-325 MG per tablet, Take 1 tablet by mouth every 8 hours as needed for Pain.  .  hydrochlorothiazide (MICROZIDE) 12.5 MG capsule, Take 12.5 mg by mouth kg)   12/15/18 0403 188 lb 4.4 oz (85.4 kg)       Exam   General Appearance: ill built, ill nourished in no acute distress on 2 LPM via nasal cannula. HEENT: Normal, Head is normocephalic, atraumatic. Oropharynx is clear and moist.  No oral thrush. PERRL Tongue is coated. Neck - Supple, No JVD present. No tracheal deviation. Lungs - Bilateral air entry present. Decreased breath sounds at bases on both sides of chest. Occasional expirator wheezes. No rales. Cardiovascular - Heart sounds are normal. Regular rhythm normal rate without murmur, gallop or rub. Abdomen - Soft,nondistended, no masses or organomegaly. S/p Laparotomy with staples present. Neurologic - Awake, alert, oriented. There are no focal motor or sensory deficits. Extremities - No cyanosis, clubbing. Bilateral leg edema. Musculoskeletal: Moving limbs for verbal commands   Lymphadenopathy:  No cervical adenopathy. Psychiatric: Patient  has a normal mood and affect. Skin - No bruising or bleeding.     Labs  - Old records and notes have been reviewed in [facility]   ABG  Lab Results   Component Value Date    PH 7.43 12/14/2018    PO2 110 12/14/2018    PCO2 37 12/14/2018    HCO3 25 12/14/2018    O2SAT 98 12/14/2018     Lab Results   Component Value Date    IFIO2 30 12/14/2018    MODE VS 12/14/2018    SETPEEP 6.0 12/14/2018     CBC  Recent Labs      12/14/18   0536  12/14/18   1553  12/15/18   0400  12/16/18   0520   WBC  29.0*   --   16.2*  11.6*   RBC  3.23*   --   2.84*  2.80*   HGB  8.8*  8.2*  7.8*  7.6*   HCT  30.1*  28.0*  26.8*  25.9*   MCV  93.2   --   94.4*  92.5   MCH  27.2   --   27.5  27.1   MCHC  29.2*   --   29.1*  29.3*   PLT  586*   --   445*  439*   MPV  11.1   --   10.5  10.5      BMP  Recent Labs      12/14/18   0536  12/15/18   0400  12/15/18   1148  12/16/18   0520   NA  146*  148*   --   154*   K  3.9  3.5  4.0  3.6   CL  111  115*   --   120*   CO2  23  26   --   24   BUN  66*  54*   --   39*   CREATININE  1.5*  1.2   --

## 2018-12-17 NOTE — PROGRESS NOTES
# 2 @ L3-4, L4-5. performed by Dereck Ott MD at [address] Left 11/13/2018    LUMBAR RFA @ L3-4, L4-5 LEFT SIDE FIRST performed by Dereck Ott MD at [address] [place] [address] ENDOSCOPY Left 12/5/2018    EGD DIAGNOSTIC ONLY performed by Carlos Acosta MD at [facility]       Restrictions/Precautions:  General Precautions, Fall Risk, Weight Bearing     Right Lower Extremity Weight Bearing: Non Weight Bearing              Other position/activity restrictions: per spouse recent right fibula fracture around 2 weeks ago and pt is NWB for 6-8 weeks,   Right Lower Extremity Brace: Boot    Prior Level of Function:  ADL Assistance: Needs assistance  Transfer Assistance: Needs assistance  Additional Comments: per spouse pt was 2 assist to w/c with pivot only due to NWB RLE with boot PTA, was at SNF for rehab    Subjective:     Subjective: RN approved. pt in bed upon arrival and agrees to therapy. soft spoken, labored breathing, fatigued, quickly, c/o back pain    Pain:  Yes. Pain Assessment  Pain Assessment: Faces (calls out, increased resp rate)       Social/Functional:  Type of Home: Facility  Home Layout: One level  Home Equipment: Wheelchair-manual     Objective:  Supine to Sit: Maximum assistance (cues needed to sequence and assist to initiate and carryout. )  Sit to Supine: Moderate assistance (of 2 with HOB flat)  Scooting: Dependent/Total (of hercules bed to boost up)    Transfers  Sit to Stand: Unable to assess (pt fatigued while EOB and req to lay back down)      Balance  Comments: seated EOB with constant min- mod A to maintain upright position. pt leaning to L side and anteriror. pt fatigued and demos decreased trunk control.  pt c/o back pain at this time    Exercises:  Exercises  Comments: supine BLE AROM to improve strength and independence with functional mobility x10 reps ankle pumps, quad sets, glute sets, heels

## 2018-12-17 NOTE — CARE COORDINATION
12/17/18, 7:59 AM    DISCHARGE BARRIERS        Patient transferred to . Report given to unit Gennaro Xiong, regarding discharge plan for this patient.

## 2018-12-17 NOTE — PROGRESS NOTES
Pharmacy Note  Vancomycin Consult    Josselyn Marcos is a 66 y.o. male re-started on Vancomycin for posssible sepsi; consult received from Dr. Nina Guzman to manage therapy. Also receiving the following antibiotics: ganciclovir, meropenem    Patient Active Problem List   Diagnosis    Cerumen impaction    ETD (eustachian tube dysfunction)    Chronic maxillary sinusitis    Deviated nasal septum    Pathologic compression fracture of lumbar vertebra (HCC)    Localized osteoporosis with current pathological fracture    Compression fracture of L3 lumbar vertebra, closed, initial encounter ([address]    Acute deep vein thrombosis (DVT) of distal vein of both lower extremities ([facility])    Benign essential hypertension    Chronic obstructive pulmonary disease ([facility])    Hypokalemia    Mixed hyperlipidemia    Acute midline low back pain    Chronic pain syndrome    Spondylosis of lumbar region without myelopathy or radiculopathy    Lumbar spondylosis    Spinal stenosis of lumbar region without neurogenic claudication    Acute postoperative respiratory failure ([address]    Septic shock ([facility])    Aspiration pneumonia ([address]    Idiopathic pulmonary fibrosis ([facility])    Moderate malnutrition ([address]    Abdominal distension    Small intestinal gangrene ([address]    Sepsis due to Candida species ([facility])    Hypophosphatemia    Sepsis affecting skin ([facility])       Allergies:  Amiodarone;  Losartan; and Tiotropium     Temp max: 101.6    Recent Labs      12/17/18   0607  12/17/18   1532   BUN  32*  33*       Recent Labs      12/17/18   0607  12/17/18   1532   CREATININE  1.0  0.9       Recent Labs      12/17/18   0607  12/17/18   1532   WBC  14.2*  16.2*         Intake/Output Summary (Last 24 hours) at 12/17/18 1656  Last data filed at 12/17/18 0457   Gross per 24 hour   Intake 1142 ml   Output 3800 ml   Net -2658 ml       Culture Date Source Results   12/17/18 Urine       12/17/18 Blood x 2                   Ht Readings from Last 1

## 2018-12-17 NOTE — PROGRESS NOTES
Renal Adjustment Follow-up    Recent Labs      12/16/18   0520  12/17/18   0607   BUN  39*  32*       Recent Labs      12/16/18   0520  12/17/18   0607   CREATININE  0.9  1.0       Estimated Creatinine Clearance: 67 mL/min (based on SCr of 1 mg/dL). Plan:  Will decrease ganciclovir back to 2.5mg/kg IV every 12 hours for CrCl 50-69 ml/min    Romina VizcarraD, BCPS 12/17/2018 10:12 AM

## 2018-12-17 NOTE — PROGRESS NOTES
O  U  T  P  U  T   Urine  (mL/kg/hr) 1850  (2.7)   1850    Shift Total  (mL/kg) 1850  (21.8)   1850  (21.8)   Weight (kg) 84.8 84.8 84.8 84.8     LABS     Recent Labs      12/15/18   0400  12/15/18   1148  12/16/18   0520  12/17/18   0607   WBC  16.2*   --   11.6*  14.2*   HGB  7.8*   --   7.6*  9.0*   HCT  26.8*   --   25.9*  29.6*   PLT  445*   --   439*  422*   NA  148*   --   154*  150*   K  3.5  4.0  3.6  3.9   CL  115*   --   120*  114*   CO2  26   --   24  25   BUN  54*   --   39*  32*   CREATININE  1.2   --   0.9  1.0   MG  2.2   --   2.1  2.0   PHOS  2.8   --   1.6*  2.6   CALCIUM  8.7   --   8.9  8.7      No results for input(s): PTT, INR in the last 72 hours. Invalid input(s): PT  No results for input(s): AST, ALT, BILITOT, BILIDIR, AMYLASE, LIPASE, LDH, LACTA in the last 72 hours. No results for input(s): TROPONINT in the last 72 hours. RADIOLOGY      Narrative   PROCEDURE: XR CHEST PORTABLE       CLINICAL INFORMATION: resp failure, .       COMPARISON: Chest x-ray dated 12/7/2018       TECHNIQUE: AP Portable supine chest xray       FINDINGS:   Lungs/pleura:  There are coarse bilateral interstitial infiltrates consistent with pulmonary fibrosis. There is no consolidative pneumonia. There is left lower lobe atelectasis. No pleural effusion. No pneumothorax. Heart: Heart size is upper normal.   Mediastinum/milton: No obvious mass or adenopathy. Skeleton: No acute bone or joint abnormality. Lines/tubes/devices: Endotracheal tube tip lies at the thoracic inlet 6.9 cm above the marcus. NG tube courses at least to the level of the mid stomach, tip not seen on this level consistent courses beyond the inferior margin of the image. Right jugular    central venous catheter tip lies in the inferior right atrium.               Impression       Lines and tubes as discussed above.  Consider withdrawing the right jugular central line 10 cm as it is currently positioned in the inferior aspect of the right atrium. Left lower lobe atelectasis. No definite pneumonia. Coarse bilateral interstitial infiltrates consistent with pulmonary fibrosis. Narrative   PROCEDURE: XR ABDOMEN (KUB) (SINGLE AP VIEW)       CLINICAL INFORMATION: post operative ileus, .       COMPARISON: KUB dated 12/7/2019       TECHNIQUE: A supine AP view of the abdomen was obtained.       FINDINGS:        Lower chest: Interstitial infiltrates are present at the lung bases which may represent pulmonary fibrosis or an acute viral-type pneumonia or pulmonary edema. Central line lies in the inferior right atrium.       Lines/tubes: IVC filter again lies to the right of L3. There is a No catheter in the pelvis.       Bowel gas pattern: There is mild gaseous distention of small bowel in the lower abdomen consistent with an ileus. There is gas within mildly distended colon. There is moderate stool in the colon.       Free intraperitoneal air: none visualized       Miscellaneous: No suspicious abdominal pelvic calcifications. Multiple surgical clips project over the lumbar spine. Skin staples are present in the midline.       Regional skeleton: Bones are osteopenic. Patient is status post L3 kyphoplasty for a moderate to severe compression fracture.           Impression       Mild small bowel ileus. Moderate stool in the colon. Bibasilar pulmonary interstitial infiltrates, see above.        Electronically signed by Seth Mcgovern MD on 12/17/2018 at 9:41 AM

## 2018-12-17 NOTE — PROGRESS NOTES
anticoagulation for indefinite period. -Aspiration precautions. -SLP eval recommending strict NPO, high aspiration risk   -Deep Venous Thrombosis Prophylaxis: lovenox. He had a hx of left femoral and popliteal DVT.  This occurred despite Xarelto therapy. [address] filter placed at that time       -Discussed with RN taking care of patient regarding patient condition and my management plan. Liliya Horvath and his wife were educated about my impression and plan. They verbalizes understanding. Questions and concerns addressed. Electronically signed by   STEPH Reyez - CNP on 12/17/2018 at 2:45 PM     Addendum by Dr. Mary Jane Hummel MD:  I have seen and examined the patient independently. Face to face evaluation and examination was performed. The above evaluation and note has been reviewed. Labs and radiographs were reviewed. I Have discussed with Mr. Marietta Plasencia CNP about this patient in detail. The above assessment and plan has been reviewed. Please see my modifications mentioned below. My modifications:  He is on 2LPM via nasal cannula. Not in any distress. .  Continue PT/OT. Antibiotic management per ID service.     Ivet Pepper MD 12/17/2018 3:41 PM

## 2018-12-17 NOTE — PROGRESS NOTES
[address] ICU STEPDOWN TELEMETRY 4K  Bedside Swallowing Evaluation      SLP Individual Minutes  Time In: 9428  Time Out: 7228  Minutes: 11          Date: 2018  Patient Name: Nazia Mcneill      CSN: [id number]   : 1940  (74 y.o.)  Gender: male   Referring Physician:  Dr Juni Rebollar  Diagnosis: aspiration pneumonia  Secondary Diagnosis:  dysphagia  History of Present Illness/Injury: Pt admit with above diagnosis, esophageal foot impacting suspected aspiration pneumonia, dysphagia, GI bleeding, s/p mesenteric angio with embolization on 12-3-18. Pt had EGD on 18, pt extubated 18 with  reintubation on . Patient found to have partial small bowel obstruction requiring NG tube with low intermittent suctioning.  Patient extubated 18.  Partial small bowel obstruction did not improve and ultimately patient developed full bowel obstruction.  He underwent surgical intervention on 18 and was found to have a small portion of the small bowel with necrosis and gangrene.   Order recieved for speech to reassess swallowing function to determine safest for resuming oral diet  Past Medical History:   Diagnosis Date    Anemia     [facility]     [facility])     GERD (gastroesophageal reflux disease)     High triglycerides     Hx of blood clots     Hypertension     Neuropathy     Pulmonary fibrosis (HCC)        Pain:  Unable to state    Current Diet: NPO    Respiratory Status:  [x] Nasal Cannula     Behavioral Observation: [] Alert [] Oriented [x] Confused [x] Lethargic      [] Dysarthric [x] Limited Direction Following [] Agitated      [] Other:    ORAL MECHANISM EVALUATION:         Comments:  Facial / Labial []WFL [x] Impaired []DNT Poor strength and coordination   Lingual []WFL [x] Impaired []DNT Poor strength and coordination   Dentition []WFL [] Impaired [x]DNT    Velum []WFL [] Impaired

## 2018-12-17 NOTE — CONSULTS
66 y.o. male with non-displaced fracture base of the proximal phalanx of the great toe.  - No operative intervention  - recommend activities to tolerance  - if he desires, may attempt buddy taping to the 2nd toe, but not mandatory  - he was counseled it may take 6-8 weeks for pains to fully resolve  - follow-up as needed    Renu Robles MD  Orthopaedic Surgery

## 2018-12-17 NOTE — PROGRESS NOTES
TPN Follow Up Note    Assessment: Remains NPO. Weight 84.6kg (down from 84.8 kg yesterday). Mookie eval.  Consider decreasing chemsticks to BID if no sliding scale insulin needed today.     Electrolyte Replacement: None    TPN changes for (today) at 1800: No change    Re-check BMP, Mg, PO4, iCa 12/18/18 AM (already ordered)    Marisela Wilson PharmD, BCPS 12/17/2018 1:55 PM

## 2018-12-18 NOTE — PROGRESS NOTES
Placeholder    vancomycin  1,250 mg Intravenous Q18H    famotidine (PEPCID) injection  20 mg Intravenous Daily    ipratropium-albuterol  1 ampule Inhalation Q4H WA    enoxaparin  1 mg/kg Subcutaneous Q12H    meropenem  1 g Intravenous Q8H    tobramycin-dexamethasone  1 drop Both Eyes TID    insulin lispro  0-12 Units Subcutaneous Q6H    sodium chloride flush  10 mL Intravenous 2 times per day    magnesium replacement protocol   Other RX Placeholder    potassium replacement protocol   Other RX Placeholder     PRN Meds: fentanNYL, hydrALAZINE, glucose, dextrose, glucagon (rDNA), dextrose, ondansetron, acetaminophen      Intake/Output Summary (Last 24 hours) at 12/18/18 1028  Last data filed at 12/18/18 0300   Gross per 24 hour   Intake          3320.99 ml   Output             1900 ml   Net          1420.99 ml       Diet:  Diet NPO Effective Now  PN-Adult  3 IN 1 Central Line (Custom)    Exam:  /79   Pulse 110   Temp 98.2 °F (36.8 °C) (Axillary)   Resp 28   Ht 6' (1.829 m)   Wt 178 lb 1.6 oz (80.8 kg)   SpO2 94%   BMI 24.15 kg/m²     General appearance: Chronically ill appearing    HEENT: Pupils equal, round, and reactive to light. Conjunctivae/corneas clear. Neck: Supple, with full range of motion. No jugular venous distention. Trachea midline. Respiratory:   Increased resp effort, crackles bases bilat  Cardiovascular:  tachycardic  Abdomen: Soft,  Staples in place,  Wound red  Musculoskeletal: passive and active ROM x 4 extremities.   Skin:  See above  Neurologic:  Confused mumbles, disoriented  Psychiatric: ibid  Capillary Refill: Brisk,< 3 seconds   Peripheral Pulses: +2 palpable, equal bilaterally       Labs:   Recent Labs      12/17/18   0607  12/17/18   1532  12/18/18   0528   WBC  14.2*  16.2*  16.6*   HGB  9.0*  9.5*  10.0*   HCT  29.6*  31.5*  33.3*   PLT  422*  450*  425*     Recent Labs      12/16/18   0520  12/17/18   0607  12/17/18   1532  12/18/18   0528   NA  154*  150*  149* software. It may contain minor errors which are inherent in voice recognition technology. **      Final report electronically signed by Dr. Emmanuel Iraheta on 12/3/2018 1:18 PM      XR CHEST PORTABLE   Final Result      Stable appearance of the chest compared to the prior study as discussed above. **This report has been created using voice recognition software. It may contain minor errors which are inherent in voice recognition technology. **      Final report electronically signed by Dr. German Fiore on 12/2/2018 2:18 AM      XR CHEST PORTABLE   Final Result   1. The distal tip of the endotracheal tube is 3.8 cm above the level of the marcus. 2. The esophageal tube extends into the stomach and off the inferior aspect of the image. 3. The distal tip of the right jugular central venous catheter projects over the right atrium. 4. There are stable infiltrates scattered throughout both lung fields, most consolidated within the left mid and lower chest and right lower chest. Edema and/or pneumonia should be considered. There is no pleural effusion. Follow-up chest radiographs are    recommended to confirm complete resolution. **This report has been created using voice recognition software. It may contain minor errors which are inherent in voice recognition technology. **      Final report electronically signed by Dr. Su Saint on 12/1/2018 9:15 AM      XR CHEST PORTABLE   Final Result      Lines and tubes as discussed above. Diffuse bilateral alveolar interstitial infiltrates which may represent pulmonary edema or an atypical viral-type pneumonia superimposed on pulmonary fibrosis. **This report has been created using voice recognition software. It may contain minor errors which are inherent in voice recognition technology. **      Final report electronically signed by Dr. German Fiore on 12/1/2018 5:37 AM      XR ABDOMEN FOR NG/OG/NE TUBE PLACEMENT   Final Result   Tip of the NG tube

## 2018-12-18 NOTE — PROGRESS NOTES
TPN Follow Up Note    Assessment: Patient pulled out central access. Spoke with Dr. Vielka Castillo and dietician who would like to proceed with PPN tonight. Patient is febrile so unclear when central access will be re-established. Patient remains NPO. Potassium and phosphorus increasing.     Electrolyte Replacement: None    TPN changes for (today) at 1800:    Decrease macronutrients per dietary to 15 kcal/kg total kcal, 1.2 g/kg protein, and 30% lipids   Decrease kphos to 10 mmol   Decrease kacetate to 30 mEq    Re-check BMP, Mg, PO4, iCa 12/19/18 AM    Chey Marshall, PharmD, BCPS  12/18/2018  11:15 AM

## 2018-12-18 NOTE — PROGRESS NOTES
Nutrition Assessment (Parenteral Nutrition)    Type and Reason for Visit: Reassess    Nutrition Recommendations: Recommend consider change PN to PPN until able to obtain central access again. Would recommend dosing wt: 85 kgm, 15 kcals/kgm, 1.2 gm protein/kgm and 30% kcals from lipids. Consider TF when able to use gut (as SLP recommend NPO). Nutrition Assessment:   Pt. Declining from a nutritional standpoint AEB not receiving PN at present d/t pt pulling central line. Remains at risk for further nutritional compromise r/t altered GI function (ileus); severe dysphagia per SLP (recommending strict NPO) and increased nutrient needs for wound healing . Recommend PPN via peripheral line until able to obtain central access. Suggest dosing wt: 85 kgm, 15 kcals/kgm, 1.2 gm protein and 30% kcals from lipids (would provide pt with 1275 kcals, 102 gm protein and 142 gm CHO/day). Will monitor sodium for improvement (Na 150 - sodium removed from TPN and pharmacy considering increasing free water). Suggest increase protein in TPN for wound healing as renal function allows (BUN 32, Cr 1.1). Malnutrition Assessment:  · Malnutrition Status: Meets the criteria for moderate malnutrition  · Context: Chronic illness  · Findings of the 6 clinical characteristics of malnutrition (Minimum of 2 out of 6 clinical characteristics is required to make the diagnosis of moderate or severe Protein Calorie Malnutrition based on AND/ASPEN Guidelines):  1. Energy Intake-Less than 75% of estimated energy requirement for greater than or equal to 1 month,      2. Muscle Loss-Mild muscle mass loss, Temples (temporalis muscle)    Nutrition Risk Level: High    Nutrient Needs:  · Estimated Daily Total Kcal: ~2117 kcals ( 25 kcals/kg on 12/3 weight of 88.2 kg)  · Estimated Daily Protein (g): ~106 grams ( 1.2 grams protein/kg on 12/3 weight of 88.2 kg)    Nutrition Diagnosis:   · Problem:  Moderate malnutrition  · Etiology: related to

## 2018-12-18 NOTE — PROGRESS NOTES
transfers    Long term goals  Time Frame for Long term goals : no LTGs set secondary to short ELOS        AM-PAC Inpatient Mobility without Stair Climbing Raw Score : 7  AM-PAC Inpatient without Stair Climbing T-Scale Score : 28.66  Mobility Inpatient CMS 0-100% Score: 86.29  Mobility Inpatient without Stair CMS G-Code Modifier : CM

## 2018-12-18 NOTE — PROGRESS NOTES
[facility]    Patient - Renea Cantu   MRN -  [id number]   Formerly [facility] # - [de-identified]   - 1940      Date of Admission -  2018  4:46 AM  Date of evaluation -  2018  Room - [address] Day - 16  Consulting - Myriam Pierce MD Primary Care Physician - Mohammed Apgar, MD     Problem List      Active Hospital Problems    Diagnosis Date Noted    Sepsis affecting skin ([address] [A41.9]     Hypophosphatemia [E83.39] 2018    Sepsis due to Candida species ([address] [B37.7]     Small intestinal gangrene ([address] [K55.029]     Moderate malnutrition ([address] [E44.0] 2018     Class: Chronic    Idiopathic pulmonary fibrosis ([address] [J84.112]     Aspiration pneumonia ([address] [J69.0] 2018    Septic shock ([address] [A41.9, R65.21]     Acute postoperative respiratory failure ([address] [J95.821] 2018    Acute deep vein thrombosis (DVT) of distal vein of both lower extremities ([address] [I82.4Z3] 2018     Reason for Consult/ Follow up note. He was seen by Dr. Emani Gonzales MD in ICU.    For management of hypoxic respiratory failure  HPI   History Obtained From:     Patient is a 75-year-old white male reformed smoker. Ag Tamayo has a complex past medical history with a diagnosis of idiopathic pulmonary fibrosis with concomitant emphysema.  This is managed at the [facility]. [facility] pulmonary function studies show a diminished DLCO, but a preserved FVC and FEV1.  The clinical explanation is that the pulmonary fibrosis and the emphysema are sustaining the FEV1/FVC ratio.  I do not have total lung capacity to confirm a restrictive defect.  However, the FVC is within the normal range.  Regardless, patient was given a trial of Esbriet, but could not tolerate the side effects.  Per documentation from the [facility] clinic, patient is to wear oxygen chronically.  Patient has significant baseline deconditioning with a documented MRC dyspnea score at level III.  Patient also suspicious abdominal pelvic calcifications. There are again surgical clips in the mid abdomen. Skin staples project to the left of midline. IVC filter projects over the upper lumbar spine. Regional skeleton: Patient again status post L3 kyphoplasty for a moderate to severe compression fracture. There are degenerative changes of the spine. Impression:  Non obstructive bowel gas pattern. Persistent small bowel ileus. CT Scans  (See actual reports for details)  Accession Number:  Procedure Name:             Exam Date/Time:     [id number]      CT Thorax Lungs WO Contr    4/9/2013 11:19:29 AM         Reason For Exam     interstitial lung disease;interstitial lung disease  IMPRESSION: 1. SEVERE PULMONARY EMPHYSEMA IN THE           UPPER LOBES. 2.  INTERSTITIAL PULMONARY FIBROSIS MOST EVIDENT IN THE PERIPHERY AND           THE BASES, MODERATE IN DEGREE AND MAY BE DUE TO IDIOPATHIC PULMONARY           FIBROSIS (UIP). 3.  HIATAL HERNIA. 4.  DENSITY IS QUESTIONABLY MORE INFILTRATIVE OF THE LINGULA BUT PROBABLY    IS AGGREGATED FIBROSIS. MINIMAL NODULAR DENSITY AT LEAST IN THE     CENTRAL           RIGHT BASE IS POSSIBLE SUCH THAT A FOLLOW-UP EXAM IS 3-6 MONTHS WOULD           BE SUGGESTED FOR STABILITY ASSESSMENT IF CLINICALLY INDICATED. Assessment   - Acute on chronic respiratory failure with hypoxia due to pulmonary fibrosis and underlying COPD.  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  -Chronic immunosuppression.  -Recurrent DVT S/p  IVC filter placed in November 2018.  He failed Xarelto therapy per Dr. Dunia Gonzales MD note  -Idiopathic pulmonary fibrosis: Failed trial of Esbriet.  Clearly progressive symptomatically upon review of notes from the [facility] with significant neuromuscular weakness resulting in tachypnea.  Significant diffusion deficit on PFTs.  -Chronic inflammatory demyelinating polyneuropathy.  -Chronic debilitated state.  -Severe protein calorie malnutrition:  -Full Code  Plan   -Follow pending cultures.  -Acapella Q4h as tolerated. -Titrate Oxygen to keep Spo2 >90%. -Continue PT/OT. -Need anticoagulation for indefinite period. -Aspiration precautions. -SLP eval recommending strict NPO, high aspiration risk   -ID service following managing ATB's  -General surgery following postoperative care  -Deep Venous Thrombosis Prophylaxis: lovenox. He had a hx of left femoral and popliteal DVT.  This occurred despite Xarelto therapy. [address] filter placed at that time       -Discussed with RN taking care of patient regarding patient condition and my management plan. Pal Chung and his wife were educated about my impression and plan. They verbalizes understanding. Questions and concerns addressed. Electronically signed by   STEPH Colvin CNP on 12/18/2018 at 2:51 PM     Addendum by Dr. Macho Ybarra MD:  I have seen and examined the patient independently. Face to face evaluation and examination was performed. The above evaluation and note has been reviewed. Labs and radiographs were reviewed. I Have discussed with Mr. Nancy John CNP about this patient in detail. The above assessment and plan has been reviewed. Please see my modifications mentioned below. My modifications:  Not in any distress. On O2 via nasal cannula.     Shabbir Curran MD 12/18/2018 6:20 PM

## 2018-12-18 NOTE — PROGRESS NOTES
insulin lispro  0-12 Units Subcutaneous Q6H    sodium chloride flush  10 mL Intravenous 2 times per day    magnesium replacement protocol   Other RX Placeholder    potassium replacement protocol   Other RX Placeholder     Continuous Infusions:   sodium chloride 100 mL/hr at 12/18/18 1113    PN-Adult  3 IN 1 Central Line (Custom)      PN-Adult  3 IN 1 Central Line (Custom) Stopped (12/18/18 0120)    dextrose         I & O's:  I/O last 3 completed shifts: In: 4969 [I.V.:2759]  Out: 1900 [Urine:1900]  No intake/output data recorded. Intake/Output Summary (Last 24 hours) at 12/18/18 1229  Last data filed at 12/18/18 0300   Gross per 24 hour   Intake          3320.99 ml   Output             1900 ml   Net          1420.99 ml       Date 12/18/18 0000 - 12/18/18 2359   Shift 6332-0882 1417-6606 1097-5542 24 Hour Total   I  N  T  A  K  E   P.O.  (mL/kg/hr) 0  (0)   0    I.V.  (mL/kg) 400  (5)   400  (5)    TPN  (mL/kg) 230.6  (2.9)   230.6  (2.9)    Shift Total  (mL/kg) 630.6  (7.8)   630.6  (7.8)   O  U  T  P  U  T   Urine  (mL/kg/hr) 425  (0.7)   425    Shift Total  (mL/kg) 425  (5.3)   425  (5.3)   Weight (kg) 80.8 80.8 80.8 80.8           CBC:   Recent Labs      12/17/18   0607  12/17/18   1532  12/18/18   0528   WBC  14.2*  16.2*  16.6*   HGB  9.0*  9.5*  10.0*   PLT  422*  450*  425*     BMP:    Recent Labs      12/17/18   0607  12/17/18   1532  12/18/18   0528   NA  150*  149*  150*   K  3.9  4.1  4.5   CL  114*  114*  115*   CO2  25  25  23   BUN  32*  33*  32*   CREATININE  1.0  0.9  1.1   GLUCOSE  119*  124*  95     Hepatic: No results for input(s): AST, ALT, ALB, BILITOT, ALKPHOS in the last 72 hours. BNP: No results for input(s): BNP in the last 72 hours.   URINALYSIS: na      MICRO:   Collected: 12/05/18 1325   Resulting lab: [facility]   Value: Detected (Abnormal)   Comment: INTERPRETIVE INFORMATION: Cytomegalovirus Detection by PCR   Test developed and characteristics determined by [facility] edema  Neurologic: Mental status: Alert, resting        Assessment:     1. Sepsis. 2.  Pulmonary fibrosis and respiratory failure. 3.  CMV viremia. 4.  Candida glabrata in the sputum. 5.  Aspiration pneumonia. 6.  History of DVT in the legs, on Xarelto, at one point and also,  history of chronic inflammatory demyelinating neuropathy. 7.  Severe protein-calorie malnutrition. 8. Abdominal wall cellulitis     Patient Active Problem List:     Cerumen impaction     ETD (eustachian tube dysfunction)     Chronic maxillary sinusitis     Deviated nasal septum     Pathologic compression fracture of lumbar vertebra (HCC)     Localized osteoporosis with current pathological fracture     Compression fracture of L3 lumbar vertebra, closed, initial encounter ([facility]     Acute deep vein thrombosis (DVT) of distal vein of both lower extremities (HCC)     Benign essential hypertension     Chronic obstructive pulmonary disease (HCC)     Hypokalemia     Mixed hyperlipidemia     Acute midline low back pain     Chronic pain syndrome     Spondylosis of lumbar region without myelopathy or radiculopathy     Lumbar spondylosis     Spinal stenosis of lumbar region without neurogenic claudication     Acute postoperative respiratory failure (HCC)     Septic shock (HCC)     Aspiration pneumonia (HCC)     Idiopathic pulmonary fibrosis (HCC)     Moderate malnutrition (HCC)     Abdominal distension     Small intestinal gangrene ([facility]     Sepsis due to Candida species (HCC)     Hypophosphatemia      Plan:  Continue current antibiotic with merrem for 10 - 14 days and cytovene for 21 days  Follow blood cultures done 12/17  Follow abdominal wall cellulitis to be monitored    Labs, cultures, and radiographs reviewed. Changes made as necessary. D/w Patient's R.N. and specific instructions given and infectious disease issues addressed. Will follow the patient closely with you.  Also please see the orders for updated patient care orders written by ID

## 2018-12-19 NOTE — PROGRESS NOTES
Decreased functional mobility , Decreased strength, Decreased endurance, Decreased ROM, Decreased balance, Decreased cognition  Assessment: Pt toelrated session poorly. Increased difficulty noted with pt following instructions for ther ex and keeping eyes open. Ended tx early d/t decreased alertness and increased agitation. Pt would benefit from cont skilled PT for improved functinal mobility. Prognosis: Fair     REQUIRES PT FOLLOW UP: Yes    Discharge Recommendations:  Discharge Recommendations: Continue to assess pending progress, Subacute/Skilled Nursing Facility, Patient would benefit from continued therapy after discharge    Patient Education:  Patient Education: POC, ther ex    Equipment Recommendations:  Equipment Needed: No    Safety:  Type of devices:  All fall risk precautions in place, Call light within reach, Left in bed, Nurse notified, Patient at risk for falls, Bed alarm in place    Plan:  Times per week: 3-5X GM  Times per day: Daily  Specific instructions for Next Treatment: therex, ther act, pre-gait, balance, PT to assess bed <> chair  Current Treatment Recommendations: Strengthening, ROM, Functional Mobility Training, Balance Training, Endurance Training, Safety Education & Training, Patient/Caregiver Education & Training    Goals:  Patient goals : plans return to SNF for rehab    Short term goals  Time Frame for Short term goals: 2 weeks  Short term goal 1: rolling L/R with [facility] for pressure relief  Short term goal 2: Pt to be Mod A x 1 for supine <> sit to get in/out of bed  Short term goal 3: Pt to complete sit <> stand with Mod A x 1 and maintain NWB R LE for pre-gait trng and transfer trng  Short term goal 4: Pt to stand with walker with R LE NWB with Min A for > 20 sec for pregait and transfer trng  Short term goal 5: PT to assess transfers    Long term goals  Time Frame for Long term goals : no LTGs set secondary to short ELOS

## 2018-12-19 NOTE — FLOWSHEET NOTE
0730 - Dr. Rob Mata at bedside to evaluate. Updates provided. Stated will plan for lysis of adhesions and exploratory laparotomy at approximately 1230 today. Will complete family meeting with him at approximately 18.    200 - Called and notified palliative care of plan for meeting at 1200. Consent signed by wife and placed in paper chart. 1035 - Updates provided to multidisciplinary care team during patient rounds. 80 - Family meeting with Dr. Rob Mata, this RN, and palliative care RN completed. Transferred to pre-op. Discussed with anesthesia. 1435 - Returned to [facility]. BP 205H systolic. Agitated, awake, and following commands. Stated to start propofol IV for sedation. [address] noted on inferior dressing just above symphysis pubis, outlined in marker. 1520 - Discussed updates with Rob Moreno NP. Stated will place on fentanyl IV PRN. 1630 - Clarified with Dr. Rob Mata, stated to pull NG 6 inches d/t presence in the duodenum. 26 - Paged Dr. Betsy Mitchell regarding tachypnea, abdominally breathing, and tachycardia. Stated to start fentanyl and levophed. 1850 - Confirmed with Dr. Rob Mata, [facility] to administer lovenox.
12/03/18 1900   Encounter Summary   Services provided to: Patient and family together   Referral/Consult From: [address]; Family members   [facility]pleted  (by family)   Continue Visiting Yes  (12/3 continue support)   Complexity of Encounter Moderate   Length of Encounter 15 minutes   Spiritual/Rastafari   Type Spiritual support   Assessment Approachable   Intervention Active listening;Nurtured hope   Outcome Coping     Pt was resting in bed. Spouse was present. She was at bedside when I entered room. Spouse shared pt was tired and needed rest.  She shared Friday and weekend was not good days, but today pt is better. She shared that  was here today and prayed with pt. Spiritual Care will continue to provide spiritual support to pt/family.
12/19/18 1813   Provider Notification   Reason for Communication Evaluate   Provider Name Dr. Warren Sullivan   Provider Notification Physician   Method of Communication Secure Message   Response No new orders   Notification Time 1822     Updated Dr. Warren Sullivan about results from abdominal incision culture. No new orders placed.
[address] THERAPY MISSED TREATMENT NOTE  [facility] ICU 4D  4D-16/016-A      Date: 2018  Patient Name: Winnie Burkett        CSN: [id number]   : 1940  (66 y.o.)  Gender: male   Referring Practitioner: Dr. Gila Sims  Diagnosis: acute respiratory failure         REASON FOR MISSED TREATMENT:  Hold treatment per nursing request.  Pt remains intubated and sedated. Not appropriate for early mobility at this time. Will discontinue orders, please reorder when appropriate.
[address] THERAPY MISSED TREATMENT NOTE  [facility] ICU 4D  4D-[id number]-A      Date: 2018  Patient Name: Andre Urbano        CSN: [id number]   : 1940  (66 y.o.)  Gender: male   Referring Practitioner: Dr. Jazmine Santoro  Diagnosis: acute respiratory failure         REASON FOR MISSED TREATMENT:  Missed Treat. Pt had to be intubated this am, hold today. Will check back 18.
Pt was lying down but could not talk. Pt's wife was greatly caring for him. She wanted prayer for him and pt was blesse     12/18/18 7440   Encounter Summary   Services provided to: Patient and family together   Referral/Consult From: [facility] [place]   Support System Spouse   Continue Visiting Yes  (12/18)   Complexity of Encounter Low   Length of Encounter 15 minutes   Spiritual/Gnosticism   Type Spiritual support   Assessment Approachable;Calm;Peaceful   Intervention Prayer;Nurtured hope; Active listening;Explored feelings, thoughts, concerns   Outcome Connection/belonging;Expressed gratitude;Encouraged; Hopeful;Receptive   d.
[facility]  OCCUPATIONAL THERAPY MISSED TREATMENT NOTE  [facility] OR  [facility] OR (General) POOL R*      Date: 2018  Patient Name: Stna Mendoza        CSN: [id number]    : 1940  (66 y.o.)  Gender: male   Referring Practitioner: Dr. Danuta Abrams  Diagnosis: acute respiratory failure         REASON FOR MISSED TREATMENT:  Missed Treat. Attempt x1 PT with Pt. Attempt x 2 Pt off the floor for sx. Will check back 18.
the patients health and healing. Wife was thankful for the visit and is not opposed to future visits by our staff. Continued support and encouragement for her would be helpful.

## 2018-12-19 NOTE — PROGRESS NOTES
versus partial bowel obstruction.  Patient's respiratory status continued to deteriorate with a respiratory rate in the 40s, and the patient was reintubated on 12/5/18.  He underwent bronchoscopy 12/6/18 which showed gastric content within the right lower lobe as well as a developing pneumonia.  His antibiotics were adjusted on 12/5/18. Lambert Plenty was transitioned to meropenem, and patient was started on Bactrim for possible PCP given his risk factors.  Diflucan was added for growth of Candida in the sputum.  Respiratory status significantly improved within the first 24 hours after mechanical ventilator support.  Bronchoscopy stain for PCP was negative and Bactrim was discontinued 12/7/18.  Cultures growing yeast.  Continue with Diflucan.  Patient found to have partial small bowel obstruction requiring NG tube with low intermittent suctioning.  Patient extubated 12/7/18.  Partial small bowel obstruction did not improve and ultimately patient developed full bowel obstruction.  He underwent surgical intervention on 12/12/18 and was found to have a small portion of the small bowel with necrosis and gangrene.  This was likely as a result of prior embolization therapy, but I cannot prove that theory.  Antibiotics converted from Zosyn to meropenem on 12/13/18. \"    -- concern for GIB on admission with anemia with hgb drop to 6.7 and on Xarelto with hx of DVTs. NM bleeding scan 12/3 with active bleeding mid descending colon -> IR embolization of left colic branch fed by SMA. EGD 12/5/18 by Dr. Nilesh Alcala showed no bleeding, 2 cm HH, Schatzki ring. -- Dr. Ilia Ma gen surgery following for SBO s/p Exploratory laparotomy, lysis of adhesions, small bowel resection with primary anastomosis on 12/12/18. On TPN as slow bowel fxn to return and +dysphagia s/p intubations and mental status issues. Incision red    -- Pulmonary following with his IPF/COPD and resp issues associated with above.     -- ID consulted 12/16/18 for HSV in Labs:   Recent Labs      12/17/18   0607  12/17/18   1532  12/18/18   0528   WBC  14.2*  16.2*  16.6*   HGB  9.0*  9.5*  10.0*   HCT  29.6*  31.5*  33.3*   PLT  422*  450*  425*     Recent Labs      12/18/18   0528  12/18/18   1526  12/19/18   0704  12/19/18   0848   NA  150*  144  see below  144   K  4.5   --   see below  3.9   CL  115*   --   see below  111   CO2  23   --   see below  20*   BUN  32*   --   see below  29*   CREATININE  1.1   --   see below  1.0   CALCIUM  9.2   --   see below  9.4   PHOS  3.1   --   see below  2.9     No results for input(s): AST, ALT, BILIDIR, BILITOT, ALKPHOS in the last 72 hours. No results for input(s): INR in the last 72 hours. No results for input(s): Pecolia Cruel in the last 72 hours. Urinalysis:    Lab Results   Component Value Date    NITRU NEGATIVE 12/17/2018    WBCUA 0-2 12/17/2018    BACTERIA NONE 12/17/2018    RBCUA 5-10 12/17/2018    BLOODU NEGATIVE 12/17/2018    GLUCOSEU NEGATIVE 12/17/2018       Radiology:  XR CHEST PORTABLE   Final Result   1. Interval removal of right central venous line. 2. Chronic interstitial lung changes present correlate with interstitial fibrosis. Superimposed inflammatory pneumonitis is not excluded. **This report has been created using voice recognition software. It may contain minor errors which are inherent in voice recognition technology. **      Final report electronically signed by Dr. Ricardo Beckman on 12/18/2018 2:38 AM      CT ABDOMEN PELVIS WO CONTRAST Additional Contrast? None   Final Result   Impression:   1.. Interval postsurgical changes of the abdomen with dilation of small bowel less impressive than previous exam. Minimal residual postsurgical ileus is considered. 2. Stable bilateral renal cystic changes. 3. Stable chronic changes of the lumbar spine with central canal narrowing at L3 and visualized vertebroplasty.          4. Limited detail of previously described thrombus insertion and stomach evacuation. .. **This report has been created using voice recognition software. It may contain minor errors which are inherent in voice recognition technology. **      Final report electronically signed by Dr. Ely Nolasco on 12/9/2018 4:12 PM      XR CHEST PORTABLE   Final Result   1. Enteric tube tip overlies the medial right upper abdomen which ejection over the expected location of the gastric antrum/pyloric region. 2. There is worsening airspace disease at the left mid and lower lung zones as well as the right lung base which may represent atelectasis or pneumonia. 3. Bilateral interstitial opacities are demonstrated and appear progressed from the prior examination. This may represent edema and/or fibrotic scarring. **This report has been created using voice recognition software. It may contain minor errors which are inherent in voice recognition technology. **      Final report electronically signed by Dr. Ilir Munzo on 12/7/2018 2:05 PM      XR ABDOMEN (KUB) (SINGLE AP VIEW)   Final Result      XR CHEST PORTABLE   Final Result   Stable abnormal chest.            **This report has been created using voice recognition software. It may contain minor errors which are inherent in voice recognition technology. **      Final report electronically signed by Dr. Brian Gerber on 12/7/2018 5:07 AM      XR CHEST PORTABLE   Final Result   1. Appropriately positioned lines and tubes. 2. Background fibrosis with possible superimposed infiltrates. **This report has been created using voice recognition software. It may contain minor errors which are inherent in voice recognition technology. **      Final report electronically signed by Dr. Arash Paiz on 12/5/2018 7:42 AM      CT ABDOMEN PELVIS WO CONTRAST Additional Contrast? None   Final Result   1. Dilated small bowel compatible with high-grade ileus or partial small bowel obstruction.    2. Large hiatal hernia. 3. Bibasilar atelectasis with pleural effusions superimposed on chronic interstitial disease. Infiltrates are not excluded. **This report has been created using voice recognition software. It may contain minor errors which are inherent in voice recognition technology. **      Final report electronically signed by Dr. Malorie Tafoya on 12/5/2018 5:27 AM      XR FOOT RIGHT (2 VIEWS)   Final Result   1. Nondisplaced fracture of the proximal phalanx of the right great toe. Regional soft tissue swelling. **This report has been created using voice recognition software. It may contain minor errors which are inherent in voice recognition technology. **      Final report electronically signed by Dr. Malorie Tafoya on 12/5/2018 4:35 AM      XR CHEST PORTABLE   Final Result   Stable abnormal chest.   **This report has been created using voice recognition software. It may contain minor errors which are inherent in voice recognition technology. **      Final report electronically signed by Dr. Malorie Tafoya on 12/5/2018 4:32 AM      XR CHEST PORTABLE   Final Result   Status post removal of endotracheal and nasogastric tube. 2. Decreasing lung volumes. 3. Persistent patchy bilateral infiltrates superimposed on chronic interstitial lung changes. **This report has been created using voice recognition software. It may contain minor errors which are inherent in voice recognition technology. **      Final report electronically signed by Dr. Malorie Tafoya on 12/4/2018 11:24 PM      IR ANGIOGRAM SUPERIOR MESENTERIC   Final Result   1. Very limited study, due to patient's inability to suspend respirations. .    2. Status post embolization of a left colic branch fed by the superior mesenteric artery. .. **This report has been created using voice recognition software. It may contain minor errors which are inherent in voice recognition technology. **                        Final report

## 2018-12-19 NOTE — PLAN OF CARE
[address] THERAPY MISSED TREATMENT NOTE  [facility] ICU 4D  [id number]-16/[id number]      Date: 2018  Patient Name: Pal Chung        CSN: [id number]   : 1940  (66 y.o.)  Gender: male   Referring Practitioner: Dr. Eriberto Mallory  Diagnosis: acute respiratory failure         REASON FOR MISSED TREATMENT:  Hold treatment per nursing request.  Pt with abdominal surgery on 18. Pt intubated and sedated at this time. Will check back on 18.
[facility]  PHYSICAL THERAPY MISSED TREATMENT NOTE  ACUTE CARE    Date: 2018  Patient Name: Danita Cartagena        MRN: [id number]   : 1940  (66 y.o.)  Gender: male   Referring Practitioner: Dr. Shabana Sellers  Referring Practitioner: Dr. Olga Jerez  Diagnosis: acute respiratory failure  Diagnosis: acute resp failure         REASON FOR MISSED TREATMENT:  Missed Treat. Pt intubated this am.  Per nursing hold PT today. Will check back as able.
[facility]  PHYSICAL THERAPY MISSED TREATMENT NOTE  ACUTE CARE    Date: 2018  Patient Name: Wilfredo Cummings        MRN: [id number]   : 1940  (66 y.o.)  Gender: male   Referring Practitioner: Dr. Woodrow Villalta  Referring Practitioner: Dr. Bhakti Gray  Diagnosis: acute respiratory failure  Diagnosis: acute resp failure         REASON FOR MISSED TREATMENT:  Family declined treatment. Wife continues to refuse therapy, wishes for PT/OT to discontinue at this time. Please re-order PT/OT when family agreeable.
Problem: DISCHARGE BARRIERS  Goal: Patient's continuum of care needs are met  Outcome: Ongoing  Return to [employer]. See SW progress notes.
Problem: Impaired respiratory status  Goal: Clear lung sounds  Outcome: Ongoing  Continue treatments to improve breathsounds
Problem: Nutrition  Goal: Optimal nutrition therapy  Outcome: Ongoing  Nutrition Problem:  Moderate malnutrition  Intervention: Food and/or Nutrient Delivery: Start ONS  Nutritional Goals: 75 % or more of meal intake during LOS
Problem: Nutrition  Goal: Optimal nutrition therapy  Outcome: Ongoing  Nutrition Problem: Moderate malnutrition  Intervention: Food and/or Nutrient Delivery: Continue NPO (begin diet when medically feasible)  Nutritional Goals: Patient will receive adequate nutrition within 1-4 days.
Problem: Nutrition  Goal: Optimal nutrition therapy  Outcome: Ongoing  Nutrition Problem: Moderate malnutrition  Intervention: Food and/or Nutrient Delivery: Continue NPO, Continue Parenteral Nutrition  Nutritional Goals: Patient will tolerate adequate TPN to meet 75% or more of estimated nutrition needs until appropriate to transition to po feeds during LOS.
Problem: Nutrition  Goal: Optimal nutrition therapy  Outcome: Ongoing  Nutrition Problem: Moderate malnutrition  Intervention: Food and/or Nutrient Delivery: Continue NPO, Modify current Parenteral Nutrition  Nutritional Goals: Patient will tolerate adequate TPN to meet 75% or more of estimated nutrition needs until appropriate to transition to po feeds during LOS.
Problem: Nutrition  Goal: Optimal nutrition therapy  Outcome: Ongoing  Nutrition Problem: Moderate malnutrition  Intervention: Food and/or Nutrient Delivery: Continue NPO, Start Parenteral Nutrition  Nutritional Goals: Patient will tolerate adequate TPN to meet 75% or more of estimated nutrition needs until appropriate to transition to po feeds during LOS.
Problem: Risk for Impaired Skin Integrity  Goal: Tissue integrity - skin and mucous membranes  Structural intactness and normal physiological function of skin and  mucous membranes.    Outcome: Met This Shift  Skin and mucous membranes remain intact and status unchanged    Problem: Falls - Risk of:  Goal: Will remain free from falls  Will remain free from falls      Outcome: Met This Shift  No falls this shift  Goal: Absence of physical injury  Absence of physical injury      Outcome: Met This Shift  No physical injury this shift    Problem: Discharge Planning:  Goal: Participates in care planning  Participates in care planning      Outcome: Met This Shift  Family participates in care planning  Goal: Discharged to appropriate level of care  Discharged to appropriate level of care   Outcome: Not Met This Shift  Remains in icu  Goal: Ability to perform activities of daily living will improve  Ability to perform activities of daily living will improve     Outcome: Not Met This Shift  Unable to perform activities of daily living    Problem: Airway Clearance - Ineffective:  Goal: Ability to maintain a clear airway will improve  Ability to maintain a clear airway will improve   Outcome: Met This Shift  Clear airway maintained with ETT in place    Problem: Anxiety/Stress:  Goal: Level of anxiety will decrease  Level of anxiety will decrease   Outcome: Met This Shift  Anxiety controlled this shift    Problem: Aspiration:  Goal: Absence of aspiration  Absence of aspiration   Outcome: Met This Shift  No evidence of aspiration    Problem: Cardiac Output - Decreased:  Goal: Hemodynamic stability will improve  Hemodynamic stability will improve   Outcome: Met This Shift  Pt remains hemodynamically stable this shift    Problem: Urinary Elimination:  Goal: Signs and symptoms of infection will decrease  Signs and symptoms of infection will decrease   Outcome: Met This Shift  No signs of urinary infection    Problem: Infection -
Problem: Risk for Impaired Skin Integrity  Goal: Tissue integrity - skin and mucous membranes  Structural intactness and normal physiological function of skin and  mucous membranes. Outcome: Met This Shift  Close monitoring of patients skin with prn and every 2 hr turns. Problem: Falls - Risk of:  Goal: Will remain free from falls  Will remain free from falls   Outcome: Met This Shift  Patient is currently free of falls, bed alarm on and hourly rounding completed. Goal: Absence of physical injury  Absence of physical injury   Outcome: Met This Shift  He is free of physical injury at this time. Problem: Discharge Planning:  Goal: Participates in care planning  Participates in care planning  Outcome: Met This Shift  Patient is sedated but does command and participates in plan of care. Goal: Discharged to appropriate level of care  Discharged to appropriate level of care  Outcome: Ongoing  Patient remains in ICU at this time. Problem: Airway Clearance - Ineffective:  Goal: Ability to maintain a clear airway will improve  Ability to maintain a clear airway will improve  Outcome: Met This Shift  Patient has ETT in place, frequent suctioning is needed to help clear his airway. Problem: Anxiety/Stress:  Goal: Level of anxiety will decrease  Level of anxiety will decrease  Outcome: Met This Shift  Propofol was discontinued today, patient is currently on fentanyl gtt for pain and sedation. Problem: Aspiration:  Goal: Absence of aspiration  Absence of aspiration  Outcome: Met This Shift  HOB >30 and oral care completed every 2 hrs and prn. Problem: Cardiac Output - Decreased:  Goal: Hemodynamic stability will improve  Hemodynamic stability will improve  Outcome: Met This Shift  Patient is currently off of levo gtt. Comments: Care plan reviewed with patient. Patient verbalizes understanding of the care plan and contributed to goal setting.
Problem: Risk for Impaired Skin Integrity  Goal: Tissue integrity - skin and mucous membranes  Structural intactness and normal physiological function of skin and  mucous membranes. Outcome: Ongoing  Areas of impaired skin integrity in various stages of healing - no evidence of new skin breakdown noted. Patient turned Q2H and as needed. Preventative sacral dressing in place. Problem: Falls - Risk of:  Goal: Will remain free from falls  Will remain free from falls      Outcome: Ongoing  Fall prevention measures in place and explained to patient. Call light within reach, bed in low position, bed alarm on. No falls at this time. Goal: Absence of physical injury  Absence of physical injury      Outcome: Ongoing  Safety measures in place and explained to patient. No evidence of physical injury at this time. Problem: Discharge Planning:  Goal: Participates in care planning  Participates in care planning      Outcome: Ongoing  Patient alert and oriented to person. Participates in care planning as able. Goal: Discharged to appropriate level of care  Discharged to appropriate level of care   Outcome: Not Met This Shift  Patient remains in ICU at this time. Plan for surgery this afternoon with Dr. Lauren Mayberry. Goal: Ability to perform activities of daily living will improve  Ability to perform activities of daily living will improve     Outcome: Ongoing  Patient assists with ADL's as able. Problem: Airway Clearance - Ineffective:  Goal: Ability to maintain a clear airway will improve  Ability to maintain a clear airway will improve   Outcome: Ongoing  Patient continues to have large amounts of thick secretions. Patient able to assist in suctioning airway. Strong, productive cough. Problem: Anxiety/Stress:  Goal: Level of anxiety will decrease  Level of anxiety will decrease   Outcome: Ongoing  Minimal anxiety noted. Continue to monitor.      Problem: Aspiration:  Goal: Absence of aspiration  Absence of
Problem: Risk for Impaired Skin Integrity  Goal: Tissue integrity - skin and mucous membranes  Structural intactness and normal physiological function of skin and  mucous membranes. Outcome: Ongoing  Coccyx - red. Barrier cream applied  Mucous membranes - pink, dry, intact      Problem: Falls - Risk of:  Goal: Will remain free from falls  Will remain free from falls      Outcome: Ongoing  Fall precautions in place d/t disoriented. Bed alarm on. Fall pre. Band on   Goal: Absence of physical injury  Absence of physical injury      Outcome: Ongoing  Absence of physical injury related to falls. Problem: Discharge Planning:  Goal: Participates in care planning  Participates in care planning      Outcome: Ongoing  Patient disoriented, unable to participate at this time     Problem: Airway Clearance - Ineffective:  Goal: Ability to maintain a clear airway will improve  Ability to maintain a clear airway will improve   Outcome: Ongoing  Patient able to cough up secretions and suction mouth with yanker as needed    Problem: Anxiety/Stress:  Goal: Level of anxiety will decrease  Level of anxiety will decrease   Outcome: Ongoing  No S&S of anxiety        Problem: Aspiration:  Goal: Absence of aspiration  Absence of aspiration   Outcome: Ongoing  Absence of aspiration. HOB 30 degrees.      Problem: Cardiac Output - Decreased:  Goal: Hemodynamic stability will improve  Hemodynamic stability will improve   Outcome: Ongoing  VSS      Problem: Infection - Central Venous Catheter-Associated Bloodstream Infection:  Goal: Will show no infection signs and symptoms  Will show no infection signs and symptoms   Outcome: Ongoing  No S&S noted     Problem: Nutrition  Goal: Optimal nutrition therapy  Outcome: Ongoing  TPN infusing at 75ml/hr     Problem: Gas Exchange - Impaired:  Goal: Levels of oxygenation will improve  Levels of oxygenation will improve   Outcome: Ongoing  O2 at 94% on 2 lpm via N/C    Problem: Confusion -
Exchange - Impaired:  Goal: Levels of oxygenation will improve  Levels of oxygenation will improve   Outcome: Met This Shift      Problem: Musculor/Skeletal Functional Status  Goal: Highest potential functional level  Outcome: Ongoing      Problem: Restraint Use - Nonviolent/Non-Self-Destructive Behavior:  Goal: Absence of restraint indications  Absence of restraint indications   Outcome: Not Met This Shift    Goal: Absence of restraint-related injury  Absence of restraint-related injury   Outcome: Met This Shift      Problem: Confusion - Acute:  Goal: Absence of continued neurological deterioration signs and symptoms  Absence of continued neurological deterioration signs and symptoms     Outcome: Ongoing    Goal: Mental status will be restored to baseline  Mental status will be restored to baseline     Outcome: Not Met This Shift      Problem: Injury - Risk of, Physical Injury:  Goal: Absence of physical injury  Absence of physical injury      Outcome: Met This Shift    Goal: Will remain free from falls  Will remain free from falls      Outcome: Met This Shift      Problem: Mood - Altered:  Goal: Mood stable  Mood stable     Outcome: Met This Shift      Problem: Sleep Pattern Disturbance:  Goal: Appears well-rested  Appears well-rested     Outcome: Ongoing      Comments: Care plan reviewed with patient. Patient unable to verbalize understanding of the plan of care and contribute to goal setting.
Ongoing  Blood pressure within normal limits this shift. Patient displays no bleeding abnormalities. Monitoring labs frequently for any abnormalities. Capillary refill less than 3 seconds. Skin turgor less than 3 seconds. Pulses palpable. Patient on levophed at this time to maintain BP. Problem: Urinary Elimination:  Goal: Signs and symptoms of infection will decrease  Signs and symptoms of infection will decrease   Outcome: Ongoing  Monitoring for signs and symptoms of infection. Patient receiving IV ATB. Problem: Nutrition  Goal: Optimal nutrition therapy  Outcome: Ongoing  Patient on TPN. Problem: Restraint Use - Nonviolent/Non-Self-Destructive Behavior:  Goal: Absence of restraint indications  Absence of restraint indications   Outcome: Ongoing  Restraints remain in place. Patient continues to pull at lines and equipment. Patient unable to follow commands. Goal: Absence of restraint-related injury  Absence of restraint-related injury   Outcome: Ongoing  Restraints removed and skin assessed q2 hours. No injury noted at this time. Problem: Confusion - Acute:  Goal: Absence of continued neurological deterioration signs and symptoms  Absence of continued neurological deterioration signs and symptoms     Outcome: Ongoing      Problem: Injury - Risk of, Physical Injury:  Goal: Will remain free from falls  Will remain free from falls      Outcome: Ongoing  Patient intubated and sedated. Bed alarmed armed. Bed wheels locked. Bedside table in reach. Hourly rounding being completed. Side rails upx 4. Comments: Care plan reviewed with patient's wife. Patient's wife verbalize understanding of the plan of care and contribute to goal setting.
level  Outcome: Ongoing  Patient continues to have bilateral weakness in lower extremities. Problem: ABCDS Injury Assessment  Goal: Absence of physical injury  Outcome: Met This Shift  Patient remained free from physical injury this shift. Problem: Confusion - Acute:  Goal: Absence of continued neurological deterioration signs and symptoms  Absence of continued neurological deterioration signs and symptoms     Outcome: Ongoing  Patient continues to have confusion at this time. Problem: Injury - Risk of, Physical Injury:  Goal: Will remain free from falls  Will remain free from falls      Outcome: Met This Shift  Patient remained free from falls this shift. Fall sign posted, armband & socks on, bed exit alarm on. Comments: Care plan reviewed with patient. No family present at this time.
met  Outcome: Ongoing      Problem: Gas Exchange - Impaired:  Goal: Levels of oxygenation will improve  Levels of oxygenation will improve   Outcome: Ongoing  Pulse ox good on 40% FiO2. Titrate as needed. Problem: Musculor/Skeletal Functional Status  Goal: Highest potential functional level  Outcome: Ongoing      Problem: Restraint Use - Nonviolent/Non-Self-Destructive Behavior:  Goal: Absence of restraint indications  Absence of restraint indications   Outcome: Ongoing  Pt gets restless at times and reaches up for ET tube when restraints released. Continue to monitor and reassess need for restraints  Goal: Absence of restraint-related injury  Absence of restraint-related injury   Outcome: Met This Shift  NO sign of restraint related injury. Continue to monitor    Comments: Care plan reviewed with patient and wife. Patient and wife verbalize understanding of the plan of care and contribute to goal setting.
monitor and assess. Confused and anxious at times. Reorients easily. Problem: Cardiac Output - Decreased:  Goal: Hemodynamic stability will improve  Hemodynamic stability will improve   Outcome: Ongoing  Ongoing assessment & interventions provided throughout shift. Patient on continuous telemetry monitoring, heart tones, vitals & pulses checked at least 3 times per shift & PRN. Vitals within acceptable limits. Peripheral pulses palpable. ST after procedure. Continuing to closely monitor and assess. Problem: Infection - Central Venous Catheter-Associated Bloodstream Infection:  Goal: Will show no infection signs and symptoms  Will show no infection signs and symptoms   Outcome: Ongoing  Results for Constantino Baker (MRN [id number]) as of 12/12/2018 18:22   Ref. Range 11/30/2018 05:15 11/30/2018 05:15 12/1/2018 05:40 12/1/2018 07:34 12/1/2018 07:41 12/1/2018 14:50 12/5/2018 10:05 12/5/2018 10:55 12/5/2018 13:15 12/5/2018 13:25 12/6/2018 09:12 12/6/2018 09:12 12/6/2018 09:12 12/6/2018 13:49   CMV CULTURE Unknown           Rpt      CULTURE BLOOD #1 Unknown    Rpt    Rpt         CULTURE BLOOD #2 Unknown     Rpt            CULTURE, VIRAL RESPIRATORY Unknown           Rpt      FUNGUS CULTURE Unknown             Rpt (A)    HERPES SIMPLEX VIRUS CULTURE Unknown           Rpt      LEGIONELLA CULTURE Unknown           Rpt      MRSA SCREENING CULTURE ONLY Unknown Rpt                RESPIRATORY CULTURE Unknown Rpt (A)        Rpt  Rpt (A)      RESPIRATORY PANEL, FILM ARRAY Unknown              Rpt   URINE CULTURE Unknown   Rpt    Rpt          MRSA SCREEN Unknown No MRSA isolated                Respiratory Culture Unknown Light colonizing . .. Endotracheal tube. .. (A)       Endotracheal tube. ..  moderate growth moderate growth At least one of t. .. (A)    Cytomegalovirus (CMV) Culture Unknown           SEE BELOW      Gram Stain Result Unknown Moderate segmente. .. Rare segmented ne. .. Rare segmented ne. ..       Herpes
ETT and is aggressively reaching at it at times, responds well to 1:1 time during this but restraints will remain. Goal: Absence of restraint-related injury  Absence of restraint-related injury   Outcome: Met This Shift      Problem: ABCDS Injury Assessment  Goal: Absence of physical injury  Outcome: Met This Shift      Comments: Care plan reviewed with patient. Patient unable to verbalize understanding of the plan of care and contribute to goal setting, no visitors present to discuss with this shift.
Outcome: Ongoing  Pt is currently oriented to self and place, disoriented to time and situation.     Problem: Injury - Risk of, Physical Injury:  Goal: Absence of physical injury  Absence of physical injury      Outcome: Ongoing  Pt has no evidence of physical injury this shift. Goal: Will remain free from falls  Will remain free from falls      Outcome: Ongoing  Pt has had no falls this time this shift. Pt has N/C on.  Bed alarm on, fall band on, fall sign posted.     Problem: Sleep Pattern Disturbance:  Goal: Appears well-rested  Appears well-rested     Outcome: Ongoing  Pt has been resting adequately this shift. Comments: Care plan reviewed with patient and family. Patient and family verbalize understanding of the plan of care and contribute to goal setting.
signs and symptoms  Absence of continued neurological deterioration signs and symptoms     Outcome: Ongoing  Pt is currently oriented to self, disoriented to place, time, and situation. Problem: Injury - Risk of, Physical Injury:  Goal: Absence of physical injury  Absence of physical injury      Outcome: Ongoing  Pt has no evidence of physical injury this shift. Goal: Will remain free from falls  Will remain free from falls      Outcome: Ongoing  Pt has had no falls this time this shift. Pt has N/C on.  Bed alarm on, fall band on, fall sign posted. Problem: Sleep Pattern Disturbance:  Goal: Appears well-rested  Appears well-rested     Outcome: Ongoing  Pt has been resting adequately this shift. Comments: Care plan reviewed with patient and family. Patient and family verbalize understanding of the plan of care and contribute to goal setting.

## 2018-12-19 NOTE — PROGRESS NOTES
PICC Procedure Note    Liliya Horvath   Admitted- 12/1/2018  4:46 AM  Admission diagnosis- Acute respiratory failure ([address] [J96.00]      Attending Physician- Camron Ray MD  Ordering Physician-same  Indication for Insertion: TPN    Catheter Insertion Date- 12/19/2018   Lot Number- [id number]  Gauge-6  Lumen-triple    Insertion Site- RAQUEL Basilic  Vein Diameter- 3 mm  Catheter Length- 48 cm  Internal Length- 46 cm  Exposed Catheter Length- 2cm   Upper Arm Circumference- 26cm  Tip Confirmation System Bundle met- Yes  If X-ray required, Tip Location- N/A  Radiologist- N/A    PICC insertion successful- Yes  Ultrasound- yes    Okay To Use PICC- Yes    Electronically signed by Lisandro Meza RN on 12/19/2018 at 12:00 PM

## 2018-12-19 NOTE — PROGRESS NOTES
INTERPRETIVE INFORMATION: Cytomegalovirus Detection by PCR   Test developed and characteristics determined by [facility]. See Compliance Statement A: [email or web address]   Performed by Gregory Gerard   [place] , [address] [facility] [phone]   www. Ashutosh Alfonso MD - Lab. Director    *Additional information available - comment   12/10/2018  9:08 AM - [facility] Incoming Lab Results From Soft     Component Results     Component Collected Lab   CMV BY PCR, QUALITATIVE BLOOD  (Abnormal) 12/05/2018  1:25 PM [facility]   Detected         12/10/2018  7:15 AM - [facility] Incoming Lab Results From Soft     Component Results     Component Collected Lab   Respiratory Culture  (Abnormal) 12/06/2018  9:12 AM [facility]      At least one of the drugs in current antibiotic therapy is   ineffective in vitro for isolates. Gram Stain Result 12/06/2018  9:12  [facility]   Rare segmented neutrophils observed. Rare epithelial cells observed. Moderate budding yeast and pseudohyphae observed. Organism  (Abnormal) 12/06/2018  9:12  [facility]   Candida albicans     Respiratory Culture 12/06/2018  9:12 AM  - [facility]   moderate growth    Organism  (Abnormal) 12/06/2018  9:12 AM  - [facility]   Shy glabrata           IMAGING:   cxr 12/16     Impression   1. Lungs fiber emphysematous in appearance. Extensive pulmonary fibrosis in both lungs. 2. Suggestion of moderate superimposed left basilar atelectasis/pneumonia. No effusion is seen. Slight improvement from prior study. Objective:   Vitals: BP (!) 152/73   Pulse 105   Temp 98.2 °F (36.8 °C) (Oral)   Resp 28   Ht 6' (1.829 m)   Wt 178 lb 1.6 oz (80.8 kg)   SpO2 92%   BMI 24.15 kg/m²   HEENT: Head: Normocephalic, no lesions, without obvious abnormality.   Lungs: rhonci positive  Heart: S1, S2 normal  Abdomen: soft, non-tender; bowel sounds normal; no masses,  no organomegaly  Extremities: extremities normal, atraumatic, no cyanosis or edema  Neurologic: Mental status: Alert, resting        Assessment:     1. Sepsis. 2.  Pulmonary fibrosis and respiratory failure. 3.  CMV viremia. 4.  Candida glabrata in the sputum. 5.  Aspiration pneumonia. 6.  History of DVT in the legs, on Xarelto, at one point and also,  history of chronic inflammatory demyelinating neuropathy. 7.  Severe protein-calorie malnutrition. Patient Active Problem List:     Cerumen impaction     ETD (eustachian tube dysfunction)     Chronic maxillary sinusitis     Deviated nasal septum     Pathologic compression fracture of lumbar vertebra (HCC)     Localized osteoporosis with current pathological fracture     Compression fracture of L3 lumbar vertebra, closed, initial encounter ([facility] [address]     Acute deep vein thrombosis (DVT) of distal vein of both lower extremities (HCC)     Benign essential hypertension     Chronic obstructive pulmonary disease (HCC)     Hypokalemia     Mixed hyperlipidemia     Acute midline low back pain     Chronic pain syndrome     Spondylosis of lumbar region without myelopathy or radiculopathy     Lumbar spondylosis     Spinal stenosis of lumbar region without neurogenic claudication     Acute postoperative respiratory failure (HCC)     Septic shock (HCC)     Aspiration pneumonia (HCC)     Idiopathic pulmonary fibrosis (HCC)     Moderate malnutrition (HCC)     Abdominal distension     Small intestinal gangrene ([address]     Sepsis due to Candida species (HCC)     Hypophosphatemia      Plan:  Continue current antibiotic with merrem for 10 - 14 days and cytovene for 21 days  D/w dr. Sean Vaughn for dana transfer     Labs, cultures, and radiographs reviewed. Changes made as necessary. D/w Patient's R.N. and specific instructions given and infectious disease issues addressed. Will follow the patient closely with you.  Also please see the orders for updated patient care orders

## 2018-12-19 NOTE — PROGRESS NOTES
kg)    Nutrition Diagnosis:   · Problem: Moderate malnutrition  · Etiology: related to Catabolic illness, Insufficient energy/nutrient consumption     Signs and symptoms:  as evidenced by Mild muscle loss (less than 75% of projected energy needs for greater than 1 month)    Objective Information:  · Nutrition-Focused Physical Findings: chronic inflammatory demyelinating polyneuropathy contributing to debilitated state;esophageal stricture that needs dilatation per MD. + bm yesterday, telesitter  · Wound Type: Surgical Wound (12/12 SB resection due to gangrenous SB)  · Current Nutrition Therapies:  · Oral Diet Orders: NPO   · Oral Diet intake: NPO  · Oral Nutrition Supplement (ONS) Orders:  (NPO)  · ONS intake: NPO  · Parenteral Nutrition Orders:  · Type and Formula: 3-in-1 Peripheral (dosed on 85 kg, 15 kcals/kg, 1.2 grams protein/kg & 30% lipid kcals)   · Lipids: Daily  · Rate/Volume: PPN at 75 ml/hr  · Duration: Continuous  · Current PN Order Provides: PPN - 1275 kcals, 102 grams protein & 145 grams CHO/24 hours  · Anthropometric Measures:  · Ht: 6' (182.9 cm)   · Current Body Wt: 178 lb 1.6 oz (80.8 kg) (12/18, +1 edema)  · Admission Body Wt: 188 lb 11.4 oz (85.6 kg) (12/9 +1 +2 edema)  · Usual Body Wt:  (Per wife 5 years ago pt weighed 235#, but since he was sick has weighed 188-196# & had been maintaing, but thinks he has probably lost weigh recently)  · Ideal Body Wt: 178 lb (80.7 kg), % Ideal Body 98%  · BMI Classification: BMI 18.5 - 24.9 Normal Weight Body mass index is 24.15 kg/m². Nutrition Interventions:   Continue NPO, Continue Parenteral Nutrition  Continued Inpatient Monitoring, Education Initiated, Coordination of Care (12/17 Discussed TPN provision with wife.   Wife voices understanding.)    Nutrition Evaluation:   · Evaluation: Progressing toward goals   · Goals: Patient will tolerate adequate TPN to meet 75% or more of estimated nutrition needs until appropriate to transition to po feeds during

## 2018-12-19 NOTE — PROGRESS NOTES
4 staples removed from patient's lower abdominal incision to help drain fluid shown from ct scan per order of Dr. Ilia Ma. A&A cultures swabs taken and sent to lab. Dr. Ilia Ma said to place wet to dry dressing over open area where staples were removed.  No further orders received at this time

## 2018-12-19 NOTE — PROGRESS NOTES
Informed Dr. Jeffery Hallman of patient having redness along incisional area and some abdominal tenderness. Also informed him of patient's fever and rapid breathing rate and tachycardia. Received order for CT Scan of abdomen and pelvis. Updated Dr. Addie Quinonez of patient's rapid breathing and fever of 100.4. Informed her of lab results including hemoglobin of 8.2. No further orders received at this time. Will continue to monitor patient.

## 2018-12-19 NOTE — PROGRESS NOTES
support.  Bronchoscopy stain for PCP was negative and Bactrim was discontinued 12/7/18.  Cultures growing yeast.  Continue with Diflucan.  Patient found to have partial small bowel obstruction requiring NG tube with low intermittent suctioning.  Patient extubated 12/7/18.  Partial small bowel obstruction did not improve and ultimately patient developed full bowel obstruction.  He underwent surgical intervention on 12/12/18 and was found to have a small portion of the small bowel with necrosis and gangrene.  This was likely as a result of prior embolization therapy.  Antibiotics converted from Zosyn to meropenem on 12/13/18.        Subjective/Events Past 24 hours/ROS   -On 2 LPM via NC  -Tmax 101.6  -Tachycardia/tachypnea  -More alert and awake  All other sytems reviewed  PMHx   Past Medical History      Diagnosis Date    Anemia     COPD (chronic obstructive pulmonary disease) ([address]     DVT [facility]deep venous thrombosis) ([facility]     GERD (gastroesophageal reflux disease)     High triglycerides     Hx of blood clots     Hypertension     Neuropathy     Pulmonary fibrosis ([address]       Past Surgical History        Procedure Laterality Date    ABDOMINAL AORTIC ANEURYSM REPAIR  2000    BRONCHOSCOPY N/A 12/6/2018    BRONCHOSCOPY ALVEOLAR LAVAGE performed by Jose Meléndez MD at [facility]      COLONOSCOPY      FIXATION KYPHOPLASTY  10/20/2017    L3    HERNIA REPAIR      x3    IVC FILTER INSERTION Right     KYPHOSIS SURGERY N/A 10/20/2017    KYPHOPLASTY @ L3 LEFT performed by Armando Montez MD at [address] N/A 12/12/2018    LAPAROTOMY EXPLORATORY, BOWELL OBSTRUCTION performed by Mary Christensen MD at [place] [address] DX/THER AGNT PARAVERT FACET JOINT, LUMBAR/SAC, 1ST LEVEL Bilateral 7/23/2018    Bilateral Lumbar MBB@ L3-4, L4-5 performed by Armando Montez MD at [address] [place] [address] DX/THER AGNT [address], below  2.1   PHOS  3.1   --   see below  2.9   CALCIUM  9.2   --   see below  9.4     LFT  No results for input(s): AST, ALT, ALB, BILITOT, ALKPHOS, LIPASE in the last 72 hours. Invalid input(s): AMYLASE  TROP  Lab Results   Component Value Date    TROPONINT < 0.010 05/23/2018    TROPONINT < 0.010 05/23/2018    TROPONINT < 0.010 05/22/2018     BNP  No results for input(s): BNP in the last 72 hours. Lactic Acid  Recent Labs      12/17/18   1532  12/19/18   1238   LACTA  1.2  0.8     INR  No results for input(s): INR, PROTIME in the last 72 hours. PTT  No results for input(s): APTT in the last 72 hours. Glucose  Recent Labs      12/19/18   0601  12/19/18   0848  12/19/18   1313   POCGLU  107  114*  107     UA   Recent Labs      12/17/18   1548   PHUR  7.5   COLORU  YELLOW   PROTEINU  100*   BLOODU  NEGATIVE   RBCUA  5-10   WBCUA  0-2   BACTERIA  NONE   NITRU  NEGATIVE   GLUCOSEU  NEGATIVE   BILIRUBINUR  NEGATIVE   UROBILINOGEN  0.2   KETUA  NEGATIVE   . PFTs                   Sleep studies   No old studies in Epic. Cultures   12/06/18 0912- bronchial alveolar lavage- Candida glabrata (A)  12/06/18 1616- POSITIVE: Herpes Simplex virus detected by Enzyme Linked Virus   Inducible system culture. at day. Strep pneumoniae antigen-Negative  Legionella-Negative  Echocardiogram   No old studies in Epic. Radiology    CXR  XR CHEST PORTABLE  12/18/2018   1. Interval removal of right central venous line. 2. Chronic interstitial lung changes present correlate with interstitial fibrosis. Superimposed inflammatory pneumonitis is not excluded. Dec 16, 2018  PROCEDURE: XR CHEST PORTABLE  1. Lungs fiber emphysematous in appearance. Extensive pulmonary fibrosis in both lungs. 2. Suggestion of moderate superimposed left basilar atelectasis/pneumonia. No effusion is seen. Slight improvement from prior study.       XR ABDOMEN (KUB)  12/17/2018   FINDINGS:   Bowel gas pattern: Mildly distended air-filled small bowel is again noted in the lower abdomen consistent with a small bowel ileus. There is gas and contrast within nondistended colon. Gas and stool is present in the rectum. Free intraperitoneal air: none visualized   Miscellaneous: No suspicious abdominal pelvic calcifications. There are again surgical clips in the mid abdomen. Skin staples project to the left of midline. IVC filter projects over the upper lumbar spine. Regional skeleton: Patient again status post L3 kyphoplasty for a moderate to severe compression fracture. There are degenerative changes of the spine. Impression:  Non obstructive bowel gas pattern. Persistent small bowel ileus. CT Scans  (See actual reports for details)  Accession Number:  Procedure Name:             Exam Date/Time:     [id number]      CT Thorax Lungs WO Contr    4/9/2013 11:19:29 AM         Reason For Exam     interstitial lung disease;interstitial lung disease  IMPRESSION: 1. SEVERE PULMONARY EMPHYSEMA IN THE           UPPER LOBES. 2.  INTERSTITIAL PULMONARY FIBROSIS MOST EVIDENT IN THE PERIPHERY AND           THE BASES, MODERATE IN DEGREE AND MAY BE DUE TO IDIOPATHIC PULMONARY           FIBROSIS (UIP). 3.  HIATAL HERNIA. 4.  DENSITY IS QUESTIONABLY MORE INFILTRATIVE OF THE LINGULA BUT PROBABLY    IS AGGREGATED FIBROSIS. MINIMAL NODULAR DENSITY AT LEAST IN THE     CENTRAL           RIGHT BASE IS POSSIBLE SUCH THAT A FOLLOW-UP EXAM IS 3-6 MONTHS WOULD           BE SUGGESTED FOR STABILITY ASSESSMENT IF CLINICALLY INDICATED. Assessment   - Acute on chronic respiratory failure with hypoxia due to pulmonary fibrosis and underlying COPD.  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  -Chronic immunosuppression.  -Recurrent DVT S/p  IVC filter placed in November 2018.  He failed Xarelto therapy per Dr. Viet Gonzales MD note  -Idiopathic pulmonary

## 2018-12-20 PROBLEM — A41.9 SEVERE SEPSIS (HCC): Status: ACTIVE | Noted: 2018-01-01

## 2018-12-20 PROBLEM — R65.20 SEVERE SEPSIS (HCC): Status: ACTIVE | Noted: 2018-01-01

## 2018-12-20 PROBLEM — J96.21 ACUTE ON CHRONIC RESPIRATORY FAILURE WITH HYPOXIA (HCC): Status: ACTIVE | Noted: 2018-01-01

## 2018-12-20 NOTE — PROGRESS NOTES
Pharmacy Vancomycin Consult     Vancomycin Day: 4  Current Dosing: Vanc 1250mg Q18H    Temp max:  100.6    Recent Labs      12/19/18   0848  12/20/18   0115   BUN  29*  28*       Recent Labs      12/19/18   0848  12/20/18   0115   CREATININE  1.0  1.0       Recent Labs      12/19/18   1238  12/20/18   0115   WBC  13.1*  13.2*         Intake/Output Summary (Last 24 hours) at 12/20/18 0430  Last data filed at 12/19/18 2018   Gross per 24 hour   Intake 1556 ml   Output 1300 ml   Net 256 ml       Culture Date Source Results   12/17 BCx2 NGTD   12/17 UC Negative   12/19 Abdominal incision Rare gram positive cocci occurring singly and in pairs. 12/19 BC x1 NGTD   12/20 BCx2 Sent        Ht Readings from Last 1 Encounters:   12/01/18 6' (1.829 m)        Wt Readings from Last 1 Encounters:   12/18/18 178 lb 1.6 oz (80.8 kg)         Body mass index is 24.15 kg/m². CrCl: ~60 mL/min    Trough: 19.0 ug/mL    Plan: Change Vancomycin to 1250mg Q24H (prior to 4th dose)  SCr stable. Good urine output  Will continue to monitor. Thanks,  Wang Reeves, PHARM.  D  12/20/2018  4:37 AM

## 2018-12-20 NOTE — PROGRESS NOTES
This staff and Qamar Gilliam RN met with pt and his wife, daughter in law and granddaughter for hospice referral, admission, and psychosocial assessment. Pt has one son who lives with them in [facility]. Pt has been ill for several years and the family is at peace, not ready to let him go - but understanding and accepting that it is the Lord's plan. Pt and his wife met in the first grade and have been together since high school marrying 60 years ago. Staff offered support and will remain available.

## 2018-12-20 NOTE — DISCHARGE SUMMARY
Hospitalist Discharge Summary     Patient Identification:  Naveen Gutierrez  : 1940  MRN: [id number]   Account: [de-identified]     Admit date: 2018  Discharge date: 18   Attending provider: Andrei Felder MD        Primary care provider: Deanne Wheatley MD     Discharge Diagnoses:   1. Sepsis with fevers, tachycardia, leukocytosis -- POA   2. Persistent fevers  3. Acute on chronic hypoxic respiratory failure  4. SBO with gangrenous SB/mid jejunum/prox ileum with necrosis  5. Metabolic encephalopathy -- intermittent -- multifactorial   6. Cellulitis distal incision   7. Sinus tachycardia   8. Pulmonary HSV  9. +CMV viremia  10. Aspiration pneumonia  11. Tachypnea   12. Dysphagia   13. Acute blood loss anemia  14. GIB s/p embolization of a left colic branch fed by the superior mesenteric artery 12/3/18   15. Thrombocytosis   16. Hyponatremia   17. Hypokalemia  18. Metabolic acidosis  19. GABE on CKD stage II -- POA  20. Hypocalcemia  21. Hypophosphatemia   22. Recurrent extensive DVT in IVC, iliac, femoral veins -- +IVC filter   23. Idiopathic pulm fibrosis   24. Chronic inflammatory demyelinating polyneuropathy  25. Essential HTN   26. Hypertriglyceridemia    27. 1.2 cm left renal cyst/5 cm right renal lesion   28. Cholelithiasis  29. Non-displaced fracture proximal phalanx right big toe  30. GERD/2 cm HH/Schatzki's ring -- per EGD 18 by Dr. Yunier Monterroso  31. Immunodeficiency  32. Hx L3 compression fx s/p vertebroplasty  33. Lumbar spinal stenosis  34. ?dementia   35. Moderate malnutrition  dysphagia/encephalopathy and recent bowel surgery  36. Debility/deconditioning       Hospital Course:   Naveen Gutierrez is a 66 y.o. male admitted to [address] [place] [address] on 2018 from [facility] for respiratory failure due to food impaction. See H&P by Shara King MD on 18 for admitting details. Per prior notes by intensivist Dr. Carson Saavedra 12/15/18 \"Per previous notes.   Patient is a nerves II through XII intact  Extremities - peripheral pulses normal, no pedal edema, no clubbing or cyanosis  Skin - normal coloration and turgor, no rashes, no suspicious skin lesions noted    Significant Diagnostics:   Radiology:   RADIOLOGY REPORT   Final Result      XR CHEST PORTABLE   Final Result   1.. Status post right-sided PICC line placement. Tip in the superior vena cava/right atrial junction. 2. Persistent diffuse interstitial changes with superimposed left base atelectasis and or infiltrate. **This report has been created using voice recognition software. It may contain minor errors which are inherent in voice recognition technology. **      Final report electronically signed by Dr. Tim Cid on 12/20/2018 2:14 AM      CT ABDOMEN PELVIS W IV CONTRAST Additional Contrast? None   Final Result   1. No evidence of rim-enhancing abscess. 2. Probable mild ileus   3. Large fecal bolus in the rectum developing fecal impaction cannot be excluded. 4. Intraluminal filling defects in the right common femoral vein and right iliac vein left internal iliac vein and IVC compatible with thrombus. **This report has been created using voice recognition software. It may contain minor errors which are inherent in voice recognition technology. **      Final report electronically signed by Dr. Jeana Nick on 12/19/2018 3:37 PM      XR CHEST PORTABLE   Final Result   1. Interval removal of right central venous line. 2. Chronic interstitial lung changes present correlate with interstitial fibrosis. Superimposed inflammatory pneumonitis is not excluded. **This report has been created using voice recognition software. It may contain minor errors which are inherent in voice recognition technology. **      Final report electronically signed by Dr. Tim Cid on 12/18/2018 2:38 AM      CT ABDOMEN PELVIS WO CONTRAST Additional Contrast? None   Final Result   Impression:   1.. Interval this on ultrasound as well. 4. Cholelithiasis. **This report has been created using voice recognition software. It may contain minor errors which are inherent in voice recognition technology. **      Final report electronically signed by Dr. Marilyn Rhodes on 12/11/2018 9:47 AM      IR PLACE NG TUBE BY DR Estrada Andrade   Final Result   Status post successful NG tube insertion and stomach evacuation. .. **This report has been created using voice recognition software. It may contain minor errors which are inherent in voice recognition technology. **      Final report electronically signed by Dr. Roberto Jasmine on 12/9/2018 4:12 PM      XR CHEST PORTABLE   Final Result   1. Enteric tube tip overlies the medial right upper abdomen which ejection over the expected location of the gastric antrum/pyloric region. 2. There is worsening airspace disease at the left mid and lower lung zones as well as the right lung base which may represent atelectasis or pneumonia. 3. Bilateral interstitial opacities are demonstrated and appear progressed from the prior examination. This may represent edema and/or fibrotic scarring. **This report has been created using voice recognition software. It may contain minor errors which are inherent in voice recognition technology. **      Final report electronically signed by Dr. Mona Hummel on 12/7/2018 2:05 PM      XR ABDOMEN (KUB) (SINGLE AP VIEW)   Final Result      XR CHEST PORTABLE   Final Result   Stable abnormal chest.            **This report has been created using voice recognition software. It may contain minor errors which are inherent in voice recognition technology. **      Final report electronically signed by Dr. Declan Sandra on 12/7/2018 5:07 AM      XR CHEST PORTABLE   Final Result   1. Appropriately positioned lines and tubes. 2. Background fibrosis with possible superimposed infiltrates.                **This report has been created using voice recognition software. It may contain minor errors which are inherent in voice recognition technology. **      Final report electronically signed by Dr. Ankur Rick on 12/5/2018 7:42 AM      CT ABDOMEN PELVIS WO CONTRAST Additional Contrast? None   Final Result   1. Dilated small bowel compatible with high-grade ileus or partial small bowel obstruction. 2. Large hiatal hernia. 3. Bibasilar atelectasis with pleural effusions superimposed on chronic interstitial disease. Infiltrates are not excluded. **This report has been created using voice recognition software. It may contain minor errors which are inherent in voice recognition technology. **      Final report electronically signed by Dr. Joel Lay on 12/5/2018 5:27 AM      XR FOOT RIGHT (2 VIEWS)   Final Result   1. Nondisplaced fracture of the proximal phalanx of the right great toe. Regional soft tissue swelling. **This report has been created using voice recognition software. It may contain minor errors which are inherent in voice recognition technology. **      Final report electronically signed by Dr. Joel Lay on 12/5/2018 4:35 AM      XR CHEST PORTABLE   Final Result   Stable abnormal chest.   **This report has been created using voice recognition software. It may contain minor errors which are inherent in voice recognition technology. **      Final report electronically signed by Dr. Joel Lay on 12/5/2018 4:32 AM      XR CHEST PORTABLE   Final Result   Status post removal of endotracheal and nasogastric tube. 2. Decreasing lung volumes. 3. Persistent patchy bilateral infiltrates superimposed on chronic interstitial lung changes. **This report has been created using voice recognition software. It may contain minor errors which are inherent in voice recognition technology. **      Final report electronically signed by Dr. Joel Lay on 12/4/2018 11:24 PM      IR ANGIOGRAM SUPERIOR MESENTERIC   Final errors which are inherent in voice recognition technology. **      Final report electronically signed by Dr. Jovi Mendoza on 12/1/2018 9:15 AM      XR CHEST PORTABLE   Final Result      Lines and tubes as discussed above. Diffuse bilateral alveolar interstitial infiltrates which may represent pulmonary edema or an atypical viral-type pneumonia superimposed on pulmonary fibrosis. **This report has been created using voice recognition software. It may contain minor errors which are inherent in voice recognition technology. **      Final report electronically signed by Dr. Cookie Peng on 12/1/2018 5:37 AM      XR ABDOMEN FOR NG/OG/NE TUBE PLACEMENT   Final Result   Tip of the NG tube lies in the proximal gastric fundus. Consider advancing the tube 5 to 10 cm. Non-obstructive bowel gas pattern. **This report has been created using voice recognition software. It may contain minor errors which are inherent in voice recognition technology. **      Final report electronically signed by Dr. Cookie Peng on 12/1/2018 5:34 AM          Labs:   Recent Results (from the past 72 hour(s))   POCT glucose    Collection Time: 12/17/18  3:24 PM   Result Value Ref Range    POC Glucose 120 (H) 70 - 108 mg/dl   Basic Metabolic Panel    Collection Time: 12/17/18  3:32 PM   Result Value Ref Range    Sodium 149 (H) 135 - 145 meq/L    Potassium 4.1 3.5 - 5.2 meq/L    Chloride 114 (H) 98 - 111 meq/L    CO2 25 23 - 33 meq/L    Glucose 124 (H) 70 - 108 mg/dL    BUN 33 (H) 7 - 22 mg/dL    CREATININE 0.9 0.4 - 1.2 mg/dL    Calcium 8.8 8.5 - 10.5 mg/dL   CBC    Collection Time: 12/17/18  3:32 PM   Result Value Ref Range    WBC 16.2 (H) 4.8 - 10.8 thou/mm3    RBC 3.46 (L) 4.70 - 6.10 mill/mm3    Hemoglobin 9.5 (L) 14.0 - 18.0 gm/dl    Hematocrit 31.5 (L) 42.0 - 52.0 %    MCV 91.0 80.0 - 94.0 fL    MCH 27.5 26.0 - 33.0 pg    MCHC 30.2 (L) 32.2 - 35.5 gm/dl    RDW-CV 16.7 (H) 11.5 - 14.5 %    RDW-SD 55.0 (H) 35.0 - 45.0 fL Platelets 758 (H) 781 - 400 thou/mm3    MPV 10.6 9.4 - 12.4 fL   Lactic acid, plasma    Collection Time: 12/17/18  3:32 PM   Result Value Ref Range    Lactic Acid 1.2 0.5 - 2.2 mmol/L   Anion Gap    Collection Time: 12/17/18  3:32 PM   Result Value Ref Range    Anion Gap 10.0 8.0 - 16.0 meq/L   Glomerular Filtration Rate, Estimated    Collection Time: 12/17/18  3:32 PM   Result Value Ref Range    Est, Glom Filt Rate 82 (A) ml/min/1.73m2   Urine with Reflexed Micro    Collection Time: 12/17/18  3:48 PM   Result Value Ref Range    Glucose, Ur NEGATIVE NEGATIVE mg/dl    Bilirubin Urine NEGATIVE NEGATIVE    Ketones, Urine NEGATIVE NEGATIVE    Specific Gravity, Urine 1.014 1.002 - 1.03    Blood, Urine NEGATIVE NEGATIVE    pH, UA 7.5 5.0 - 9.0    Protein,  (A) NEGATIVE    Urobilinogen, Urine 0.2 0.0 - 1.0 eu/dl    Nitrite, Urine NEGATIVE NEGATIVE    Leukocyte Esterase, Urine NEGATIVE NEGATIVE    Color, UA YELLOW STRAW-YELL    Character, Urine CLEAR CLEAR-SL C    RBC, UA 5-10 0-2/hpf /hpf    WBC, UA 0-2 0-4/hpf /hpf    Epi Cells 0-2 3-5/hpf /hpf    Bacteria, UA NONE FEW/NONE S /hpf    Casts UA NONE SEEN NONE SEEN /lpf    Crystals NONE SEEN NONE SEEN    Renal Epithelial, Urine NONE SEEN NONE SEEN    Yeast, UA NONE SEEN NONE SEEN    CASTS 2 NONE SEEN NONE SEEN /lpf    MISCELLANEOUS 2 NONE SEEN    Culture blood #2    Collection Time: 12/17/18  3:51 PM   Result Value Ref Range    Blood Culture, Routine No growth-preliminary    Culture blood #1    Collection Time: 12/17/18  4:00 PM   Result Value Ref Range    Blood Culture, Routine No growth-preliminary    Blood Gas, Arterial    Collection Time: 12/17/18  4:15 PM   Result Value Ref Range    pH, Blood Gas 7.51 (H) 7.35 - 7.45    PCO2 31 (L) 35 - 45 mmhg    PO2 70 (L) 71 - 104 mmhg    HCO3 24 23 - 28 mmol/l    Base Excess (Calculated) 1.4 -2.5 - 2.5 mmol/l    O2 Sat 96 %    IFIO2 2     DEVICE Cannula     Bebo Test Positive     Source: R Radial     COLLECTED BY: [id number] neutrophils observed. Rare epithelial cells observed. Rare gram positive cocci occurring singly and in pairs.      POCT glucose    Collection Time: 12/19/18  5:15 PM   Result Value Ref Range    POC Glucose 117 (H) 70 - 108 mg/dl   Vancomycin, trough    Collection Time: 12/19/18 11:51 PM   Result Value Ref Range    Vancomycin Tr 19.0 (H) 5.0 - 15.0 ug/mL   POCT glucose    Collection Time: 12/20/18 12:31 AM   Result Value Ref Range    POC Glucose 130 (H) 70 - 108 mg/dl   Blood Gas, Arterial    Collection Time: 12/20/18  1:00 AM   Result Value Ref Range    pH, Blood Gas 7.45 7.35 - 7.45    PCO2 31 (L) 35 - 45 mmhg    PO2 115 (H) 71 - 104 mmhg    HCO3 22 (L) 23 - 28 mmol/l    Base Excess (Calculated) -1.7 -2.5 - 2.5 mmol/l    O2 Sat 99 %    IFIO2 100     DEVICE NRB     Bebo Test Positive     Source: L Radial     COLLECTED BY: [id number]    EKG 12 Lead    Collection Time: 12/20/18  1:04 AM   Result Value Ref Range    Ventricular Rate 117 BPM    Atrial Rate 117 BPM    P-R Interval 134 ms    QRS Duration 74 ms    Q-T Interval 314 ms    QTc Calculation (Bazett) 438 ms    P Axis 41 degrees    R Axis -38 degrees    T Axis 33 degrees   CBC    Collection Time: 12/20/18  1:15 AM   Result Value Ref Range    WBC 13.2 (H) 4.8 - 10.8 thou/mm3    RBC 3.13 (L) 4.70 - 6.10 mill/mm3    Hemoglobin 8.5 (L) 14.0 - 18.0 gm/dl    Hematocrit 28.9 (L) 42.0 - 52.0 %    MCV 92.3 80.0 - 94.0 fL    MCH 27.2 26.0 - 33.0 pg    MCHC 29.4 (L) 32.2 - 35.5 gm/dl    RDW-CV 17.1 (H) 11.5 - 14.5 %    RDW-SD 56.7 (H) 35.0 - 45.0 fL    Platelets 044 228 - 258 thou/mm3    MPV 11.0 9.4 - 12.4 fL   Comprehensive metabolic panel    Collection Time: 12/20/18  1:15 AM   Result Value Ref Range    Glucose 111 (H) 70 - 108 mg/dL    CREATININE 1.0 0.4 - 1.2 mg/dL    BUN 28 (H) 7 - 22 mg/dL    Sodium 144 135 - 145 meq/L    Potassium 3.9 3.5 - 5.2 meq/L    Chloride 111 98 - 111 meq/L    CO2 21 (L) 23 - 33 meq/L    Calcium 9.0 8.5 - 10.5 mg/dL    AST 33 5 - 40 U/L 12/20/18  4:40 AM   Result Value Ref Range    WBC 10.4 4.8 - 10.8 thou/mm3    RBC 2.68 (L) 4.70 - 6.10 mill/mm3    Hemoglobin 7.6 (L) 14.0 - 18.0 gm/dl    Hematocrit 25.1 (L) 42.0 - 52.0 %    MCV 93.7 80.0 - 94.0 fL    MCH 28.4 26.0 - 33.0 pg    MCHC 30.3 (L) 32.2 - 35.5 gm/dl    RDW-CV 17.2 (H) 11.5 - 14.5 %    RDW-SD 58.4 (H) 35.0 - 45.0 fL    Platelets 645 767 - 322 thou/mm3    MPV 10.9 9.4 - 12.4 fL   Hepatic Function Panel    Collection Time: 12/20/18  4:40 AM   Result Value Ref Range    Alb 1.9 (L) 3.5 - 5.1 g/dL    Total Bilirubin 0.3 0.3 - 1.2 mg/dL    Bilirubin, Direct <0.2 0.0 - 0.3 mg/dL    Alkaline Phosphatase 138 (H) 38 - 126 U/L    AST 27 5 - 40 U/L    ALT 25 11 - 66 U/L    Total Protein 5.7 (L) 6.1 - 8.0 g/dL   Anion Gap    Collection Time: 12/20/18  4:40 AM   Result Value Ref Range    Anion Gap 11.0 8.0 - 16.0 meq/L   Glomerular Filtration Rate, Estimated    Collection Time: 12/20/18  4:40 AM   Result Value Ref Range    Est, Glom Filt Rate 72 (A) ml/min/1.73m2   POCT glucose    Collection Time: 12/20/18  5:17 AM   Result Value Ref Range    POC Glucose 133 (H) 70 - 108 mg/dl   CBC auto differential    Collection Time: 12/20/18  5:24 AM   Result Value Ref Range    WBC 11.2 (H) 4.8 - 10.8 thou/mm3    RBC 2.87 (L) 4.70 - 6.10 mill/mm3    Hemoglobin 7.8 (L) 14.0 - 18.0 gm/dl    Hematocrit 26.4 (L) 42.0 - 52.0 %    MCV 92.0 80.0 - 94.0 fL    MCH 27.2 26.0 - 33.0 pg    MCHC 29.5 (L) 32.2 - 35.5 gm/dl    RDW-CV 16.8 (H) 11.5 - 14.5 %    RDW-SD 55.7 (H) 35.0 - 45.0 fL    Platelets 737 414 - 271 thou/mm3    MPV 10.9 9.4 - 12.4 fL    Seg Neutrophils 81.0 %    Lymphocytes 10.5 %    Monocytes 3.8 %    Eosinophils 2.6 %    Basophils 0.3 %    Immature Granulocytes 1.8 %    Segs Absolute 9.1 (H) 1.8 - 7.7 thou/mm3    Lymphocytes # 1.2 1.0 - 4.8 thou/mm3    Monocytes # 0.4 0.4 - 1.3 thou/mm3    Eosinophils # 0.3 0.0 - 0.4 thou/mm3    Basophils # 0.0 0.0 - 0.1 thou/mm3    Immature Grans (Abs) 0.20 (H) 0.00 - 0.07

## 2018-12-20 NOTE — PROGRESS NOTES
Hospice referral completed in room with wife Axel Chavis. Effie Brower his head yes and no to questions. Lying in bed with eyes closed with increase work of breathing. Use of assessory muslces with respirations noted to be 28. Was told that morphine dose of 2mg IV at 0930am administered prior to meeting with patient and wife by Dr. Harshil Griffin and Estelle Spence, RN with palliative care. During visit no noted improvement of respiratory status. Discussed hospice care with wife Axel Chavis that focus is on comfort care with no further aggressive treatments. She voiced that is what they wish for. She talked with this nurse that Brice Jackson has been through a lot with health status and understand poor prognosis. Talked with wife about Kennedy's poor health prognosis with symptoms not well controlled at this time. When nurse discussed with her addiment about wanting to take patient to [facility] inpatient hospice as she is familiar with facility and is close to their home. Did talk with her about Brice Jackson not being comfortable and nurses worry about putting him in ambulance on cart for ride until symptoms better managed. She voiced still wanting to move patient, no matter how many phone calls. Discussed with her that patient may also pass in transit and she told nurse that she would be okay with this. Told her that would work on her wishes and if at any time wished for evaluation and possible stay at [facility] that can assist and that hospice nurse will continue to provide support and suggestions if needed for his care. Discussed with palliative care nurse whom went in and talked with wife again. At that time, around 1011, primary nurse Bonnie Katz RN adminstered another dose of morphine 2mg IV for patient respiratory distress that noted with tachypnea with use of accessory muscles. Estelle Spence RN talked with wife about concerns in regards to patient comfort with transport as well as possiblity of passing in transport.  [facility] told her that would be okay with keeping patient at [facility] for hospice care today and seeing if able to stablize and if would stablize would wish for transfer to [facility]. This nurse discussed again with wife and she voiced that she would be agreeable to this plan of care. Call placed to Dr. Kvng Adrian, hospice medical director to review patient case and family wishes. Patient requiring another 2mg IV morphine dose at 1117. Each dose shows some improvement but symptoms return quickly. Discussed with Dr. Harshil Griffin and morphine pca being started for symptom management. Patient meeting for [facility] hospice care due to symptoms not well controlled with increase need for comfort medications. Patient to transition to 5k 24.

## 2018-12-20 NOTE — PROGRESS NOTES
chloride flush  10 mL Intravenous 2 times per day     glycopyrrolate, LORazepam, ondansetron, acetaminophen, sodium chloride flush  IV Drips/Infusions   morphine 3 mL/hr at 12/20/18 1542     Home Medications  No prescriptions prior to admission. Diet    Diet NPO Effective Now  Allergies    Amiodarone; Losartan; and Tiotropium  osed with sleep apnea in the past    Vitals     vitals were not taken for this visit. There is no height or weight on file to calculate BMI. SUPPLEMENTAL O2:       I/O      No intake or output data in the 24 hours ending 12/20/18 1618  No intake/output data recorded. No data found. Exam   Physical Exam   Constitutional: No distress on O2 per NC. Minimal response, resting comfortably. Head: Normocephalic and atraumatic. Neck: Neck supple. No tracheal deviation present. Cardiovascular: HRRR with normal S1, S2. No murmur. No peripheral edema. Pulmonary/Chest: Respirations even and unlabored. Lungs diminished with rhonchi throughout. Abdominal: Soft. Bowel sounds hypoactive. No distension. Midline incision with noted redness, no drainage. Neurological: Given Morphine. Skin: Warm and dry.    Labs     ABG  Lab Results   Component Value Date    PH 7.44 12/20/2018    PO2 60 12/20/2018    PCO2 35 12/20/2018    HCO3 23 12/20/2018    O2SAT 92 12/20/2018     Lab Results   Component Value Date    IFIO2 30 12/20/2018    MODE VS 12/14/2018    SETPEEP 6.0 12/20/2018     CBC  Recent Labs      12/20/18   0115  12/20/18   0440  12/20/18   0524   WBC  13.2*  10.4  11.2*   RBC  3.13*  2.68*  2.87*   HGB  8.5*  7.6*  7.8*   HCT  28.9*  25.1*  26.4*   MCV  92.3  93.7  92.0   MCH  27.2  28.4  27.2   MCHC  29.4*  30.3*  29.5*   PLT  355  287  291   MPV  11.0  10.9  10.9      BMP  Recent Labs      12/20/18   0115  12/20/18   0440  12/20/18   0524   NA  144  138  145   K  3.9  4.5  3.7   CL  111  107  112*   CO2  21*  20*  22*   BUN  28*  29*  31*   CREATININE  1.0  1.0  1.1   GLUCOSE  111*  537* 122*   MG  2.1  2.0  2.1   PHOS  2.8  4.1  3.6   CALCIUM  9.0  7.9*  8.7     LFT  Recent Labs      12/20/18   0115  12/20/18   0440   AST  33  27   ALT  30  25   BILITOT  0.4  0.3   ALKPHOS  164*  138*     TROP  Lab Results   Component Value Date    TROPONINT < 0.010 05/23/2018    TROPONINT < 0.010 05/23/2018    TROPONINT < 0.010 05/22/2018     BNP  No results for input(s): BNP in the last 72 hours. Lactic Acid  Recent Labs      12/19/18   1238  12/20/18   0115   LACTA  0.8  1.2     INR  No results for input(s): INR, PROTIME in the last 72 hours. PTT  No results for input(s): APTT in the last 72 hours. Glucose  Recent Labs      12/19/18   1715  12/20/18   0031  12/20/18   0517   POCGLU  117*  130*  133*     UA   No results for input(s): SPECGRAV, PHUR, COLORU, CLARITYU, MUCUS, PROTEINU, BLOODU, RBCUA, WBCUA, BACTERIA, NITRU, GLUCOSEU, BILIRUBINUR, UROBILINOGEN, KETUA, LABCAST, LABCASTTY, AMORPHOS in the last 72 hours. Invalid input(s): CRYSTALS. PFTs                   Sleep studies   No old studies in Epic. Cultures   12/06/18 0912- bronchial alveolar lavage- Candida glabrata (A)  12/06/18 1616- POSITIVE: Herpes Simplex virus detected by Enzyme Linked Virus   Inducible system culture. at day. Strep pneumoniae antigen-Negative  Legionella-Negative  Echocardiogram   No old studies in Epic. Radiology    CXR  XR CHEST PORTABLE  12/18/2018   1. Interval removal of right central venous line. 2. Chronic interstitial lung changes present correlate with interstitial fibrosis. Superimposed inflammatory pneumonitis is not excluded. Dec 16, 2018  PROCEDURE: XR CHEST PORTABLE  1. Lungs fiber emphysematous in appearance. Extensive pulmonary fibrosis in both lungs. 2. Suggestion of moderate superimposed left basilar atelectasis/pneumonia. No effusion is seen. Slight improvement from prior study.       XR ABDOMEN (KUB)  12/17/2018   FINDINGS:   Bowel gas pattern: Mildly distended air-filled small bowel is again noted in the lower abdomen consistent with a small bowel ileus. There is gas and contrast within nondistended colon. Gas and stool is present in the rectum. Free intraperitoneal air: none visualized   Miscellaneous: No suspicious abdominal pelvic calcifications. There are again surgical clips in the mid abdomen. Skin staples project to the left of midline. IVC filter projects over the upper lumbar spine. Regional skeleton: Patient again status post L3 kyphoplasty for a moderate to severe compression fracture. There are degenerative changes of the spine. Impression:  Non obstructive bowel gas pattern. Persistent small bowel ileus. CT Scans  (See actual reports for details)  Accession Number:  Procedure Name:             Exam Date/Time:     [id number]      CT Thorax Lungs WO Contr    4/9/2013 11:19:29 AM         Reason For Exam     interstitial lung disease;interstitial lung disease  IMPRESSION: 1. SEVERE PULMONARY EMPHYSEMA IN THE           UPPER LOBES. 2.  INTERSTITIAL PULMONARY FIBROSIS MOST EVIDENT IN THE PERIPHERY AND           THE BASES, MODERATE IN DEGREE AND MAY BE DUE TO IDIOPATHIC PULMONARY           FIBROSIS (UIP). 3.  HIATAL HERNIA. 4.  DENSITY IS QUESTIONABLY MORE INFILTRATIVE OF THE LINGULA BUT PROBABLY    IS AGGREGATED FIBROSIS. MINIMAL NODULAR DENSITY AT LEAST IN THE     CENTRAL           RIGHT BASE IS POSSIBLE SUCH THAT A FOLLOW-UP EXAM IS 3-6 MONTHS WOULD           BE SUGGESTED FOR STABILITY ASSESSMENT IF CLINICALLY INDICATED. Assessment   - Acute on chronic respiratory failure with hypoxia due to pulmonary fibrosis and underlying COPD.  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  -Chronic immunosuppression.  -Recurrent DVT S/p  IVC filter placed in November 2018.  He failed Xarelto therapy per Dr. Jorge Gonzales MD note  -Idiopathic pulmonary fibrosis: Failed trial of Andrey Dallin progressive symptomatically upon review of notes from the [facility] with significant neuromuscular weakness resulting in tachypnea.  Significant diffusion deficit on PFTs.   -Chronic inflammatory demyelinating polyneuropathy.  -Chronic debilitated state.  -Severe protein calorie malnutrition:  -He is now a [facility] with Hospice care  Plan   Continue with Comfort Care measures  Pulmonary will sign off    Electronically signed by   STEPH Florian CNP on 12/20/2018 at 4:18 PM

## 2018-12-20 NOTE — PROGRESS NOTES
[facility] THERAPY MISSED TREATMENT NOTE  STRZ ICU STEPDOWN TELEMETRY 4K      Date: 2018  Patient Name: Pal Chung        MRN: [id number]    : 1940  (66 y.o.)    REASON FOR MISSED TREATMENT:    Attempted to see patient for treatment, however, Vaibhav Sellers RN reports the patient is going on hospice. Will sign off at this time.       Janak Hurst M.S. [facility]

## 2018-12-20 NOTE — PROGRESS NOTES
Physical Findings: 1 BM noted past 24 hours; SLP recommending NPO  · Wound Type: Surgical Wound (12/12 SB resection due to gangrenous SB)  · Current Nutrition Therapies:  · Oral Diet Orders: NPO   · Parenteral Nutrition Orders:  · Type and Formula: 3-in-1 Custom (dosing wt: 85 kgm, 25 kcals/kgm, 1.3 gm protein/kgm and 30% kcals from lipids)   · Lipids: Daily  · Rate/Volume: 75 ml/hr via PICC line  · Duration: Continuous  · Current PN Order Provides: 2125 kcals, 110.5 gm protein and 307.5 gm CHO/day  · Goal PN Orders Provides: at goal  · Anthropometric Measures:  · Ht: 6' (182.9 cm)   · Current Body Wt: 178 lb 1.6 oz (80.8 kg) (12/18, +1 edema)  · Admission Body Wt: 188 lb 11.4 oz (85.6 kg) (12/9 +1 +2 edema)  · Usual Body Wt:  (Per wife 5 years ago pt weighed 235#, but since he was sick has weighed 188-196# & had been maintaing, but thinks he has probably lost weigh recently)  · Ideal Body Wt: 178 lb (80.7 kg),   · BMI Classification: BMI 18.5 - 24.9 Normal Weight    Nutrition Interventions:   Continue NPO, Continue Parenteral Nutrition  Continued Inpatient Monitoring, Education not appropriate at this time, Coordination of Care    Nutrition Evaluation:   · Evaluation: Progressing toward goals   · Goals: Patient will tolerate adequate TPN to meet 75% or more of estimated nutrition needs until appropriate to transition to po feeds during LOS.     · Monitoring: Nutrition Progression, PN Intake, PN Tolerance, Skin Integrity, Wound Healing, Weight, Pertinent Labs, Chewing/Swallowing, Nausea or Vomiting, Monitor Bowel Function      Electronically signed by Matilde Mercer RD, EUGENE on 12/20/18 at 9:26 AM    Contact Number: [phone]

## 2018-12-21 NOTE — PROGRESS NOTES
Hospice nursing visit made. Patient continues to rest peacefully in bed with morphine PCA infusing at 4mg/hr for his symptom management. Noted that last increase in medication needed at 637 for noted dyspnea. Patient has needed multiple increases in morphine PCA and also IV push lorazepam for symptom management in last 24hrs. Continues to meet for [facility] hospice care due to need for symptom management. Discussed disease and dying process with wife Cecilia Bravo and granddaughter Edson Mccarthy at bedside. Education done on changes that may see with VS changes, possible terminal congestion, pulse deficit, changes in bowel sounds, decrease urine output, and mottling. Talked with them that these are all normal changes that may see. Reviewed with family about comfort medication uses as well. All questions answered. Emotional support provided. Wife asked if nurse would be visiting later toady. Assured her that nurse will prior to leaving for day. Encouraged to have staff call if wished for nurse to return sooner. Updated Dr. Magdaleno Jim and Kendy Urrutia RN primary nurse of visit.

## 2018-12-21 NOTE — PLAN OF CARE
Problem: Anxiety/Stress:  Goal: Level of anxiety will decrease  Level of anxiety will decrease  Outcome: Ongoing  Resting comfortably and his facial features are relaxed. Family members report less movement of his legs. Problem: Pain:  Goal: Pain level will decrease  Pain level will decrease  Outcome: Ongoing  Morphine pca continuous rate increased to 3mg/hr and he is much more comfortable since the increase.  Family at the bedside verbalized that he is more comfortable

## 2018-12-21 NOTE — PLAN OF CARE
Problem: Anxiety/Stress:  Goal: Level of anxiety will decrease  Level of anxiety will decrease   Outcome: Ongoing  Patient appears to be comfortable with facial features relaxed. Pt did become restless with trouble breathing. O2 was increased to 4L/min and ativan was given. Pt appears to be comfortable with relaxed facial features. Problem: Pain:  Goal: Pain level will decrease  Pain level will decrease   Outcome: Ongoing  Morphine pca continuous rate at 3mg/hr and appears to be comfortable. Family is at the bedside. Pt given ativan and increased O2 to 4L/min for increased effort in breathing. Pt seems to be tolerating well and seems to be comfortable with relaxed facial features. Comments: Plan of care reviewed with family. Patient unable to verbalize understanding of plan of care at this time. Family verbalizes understanding of plan of care and contributing to goal setting.

## 2018-12-22 NOTE — PLAN OF CARE
Problem: Anxiety/Stress:  Goal: Level of anxiety will decrease  Level of anxiety will decrease   Outcome: Ongoing  Patient given Ativan earlier in day for comfort and to reduce anxiety. Problem: Pain:  Goal: Pain level will decrease  Pain level will decrease   Outcome: Ongoing  Patient is on a Morphine drip for pain and comfort. Comments: Care plan reviewed with patients family. Patients family verbalizes understanding of the plan of care and contribute to goal setting.

## 2018-12-22 NOTE — DISCHARGE SUMMARY
Hospitalist Discharge Summary     Patient Identification:  Ariana Denise  : 1940  MRN: [id number]   Account: [de-identified]     Admit date: 2018  Discharge date: 2018    Attending provider: Leon Grimes MD        Primary care provider: Flor Awad MD     Discharge Diagnoses:   1. Sepsis with fevers, tachycardia, leukocytosis -- POA   2. Persistent fevers  3. Acute on chronic hypoxic respiratory failure  4. SBO with gangrenous SB/mid jejunum/prox ileum with necrosis  5. Metabolic encephalopathy -- intermittent -- multifactorial   6. Cellulitis distal incision   7. Sinus tachycardia   8. Pulmonary HSV  9. +CMV viremia  10. Aspiration pneumonia  11. Tachypnea   12. Dysphagia   13. Acute blood loss anemia  14. GIB s/p embolization of a left colic branch fed by the superior mesenteric artery 12/3/18   15. Thrombocytosis   16. Hyponatremia   17. Hypokalemia  18. Metabolic acidosis  19. GABE on CKD stage II -- POA  20. Hypocalcemia  21. Hypophosphatemia   22. Recurrent extensive DVT in IVC, iliac, femoral veins -- +IVC filter   23. Idiopathic pulm fibrosis   24. Chronic inflammatory demyelinating polyneuropathy  25. Essential HTN   26. Hypertriglyceridemia    27. 1.2 cm left renal cyst/5 cm right renal lesion   28. Cholelithiasis  29. Non-displaced fracture proximal phalanx right big toe  30. GERD/2 cm HH/Schatzki's ring -- per EGD 18 by Dr. Danielle Trinh  31. Immunodeficiency  32. Hx L3 compression fx s/p vertebroplasty  33. Lumbar spinal stenosis  34. ?dementia   35. Moderate malnutrition  dysphagia/encephalopathy and recent bowel surgery  36.  Debility/deconditioning     Hospital Course:   Per prior acute hospitalization:  Per prior notes by intensivist Dr. Alicia Stover 12/15/18 \"Per previous notes.  Patient is a 77-year-old white male reformed smoker. Cynthia Syed has a complex past medical history with a diagnosis of idiopathic pulmonary fibrosis with concomitant emphysema.  This is managed at the [facility] HSV in bronch washing , Candida glabrata in bronch , CMV (+) in blood.  Ganciclovir , Merrem  - 12/10 and resumed ; vanc  -- s/p Acyclovir -; s/p diflucan  - 12/10; micafungin 12/10 - 12/15; s/p zosyn  -  and  - ; s/p IV bactrim  - ;  -- still having persistent fevers     -- heme/onc consulted for extensive thrombus in IVC/iliac veins -> home xarelto held for above -> transfused 3 units PRBC for blood loss related to GI issues;  Started on therapeutic lovenox      -- Brice Jackson had increase work of breathing and continued to spike fever to 101 / pm and was placed on bipap.  Wife then decided to make Brice Jackson comfortable and changed his code status to [facility] and requesting transfer to [facility]. [facility] was consulted as well as  to assist with this. Mejia Hopper pt was still having increased WOB after 2 mg of IV morphine and removing the bipap am .  Wife did agree to [facility] consult.  Pt still uncomfortable and tachypnic - wife agreed to morphine infusion to be started and prn ativan and monitor x 24 hrs prior to d/c to [facility] unit.  Pt was transitioned to [address] [place] [address] level of care and to be transferred to [place] [address].  Family has decided to stay at [address] [place] [address]. He was continued on morphine infusion as well as prn ativan and robinul. Brice Jackson was comfortable and  18 at 0450.         Examination: not performed - pt     Significant Diagnostics:   Radiology:   No orders to display       Labs:   Recent Results (from the past 72 hour(s))   POCT glucose    Collection Time: 18  8:48 AM   Result Value Ref Range    POC Glucose 114 (H) 70 - 108 mg/dl   Basic Metabolic Panel    Collection Time: 18  8:48 AM   Result Value Ref Range    Sodium 144 135 - 145 meq/L    Potassium 3.9 3.5 - 5.2 meq/L    Chloride 111 98 - 111 meq/L    CO2 20 (L) 23 - 33 meq/L    Glucose 112 (H) 70 - 108 mg/dL - 145 meq/L    Potassium 3.9 3.5 - 5.2 meq/L    Chloride 111 98 - 111 meq/L    CO2 21 (L) 23 - 33 meq/L    Calcium 9.0 8.5 - 10.5 mg/dL    AST 33 5 - 40 U/L    Alkaline Phosphatase 164 (H) 38 - 126 U/L    Total Protein 6.7 6.1 - 8.0 g/dL    Alb 2.3 (L) 3.5 - 5.1 g/dL    Total Bilirubin 0.4 0.3 - 1.2 mg/dL    ALT 30 11 - 66 U/L   Calcium, Ionized    Collection Time: 12/20/18  1:15 AM   Result Value Ref Range    Calcium, Ion 1.30 1.12 - 1.32 mmol/L   Magnesium    Collection Time: 12/20/18  1:15 AM   Result Value Ref Range    Magnesium 2.1 1.6 - 2.4 mg/dL   Phosphorus    Collection Time: 12/20/18  1:15 AM   Result Value Ref Range    Phosphorus 2.8 2.4 - 4.7 mg/dL   Culture blood #1    Collection Time: 12/20/18  1:15 AM   Result Value Ref Range    Blood Culture, Routine No growth-preliminary    Lactic Acid, Plasma    Collection Time: 12/20/18  1:15 AM   Result Value Ref Range    Lactic Acid 1.2 0.5 - 2.2 mmol/L   CK    Collection Time: 12/20/18  1:15 AM   Result Value Ref Range    Total CK 14 (L) 55 - 170 U/L   Brain Natriuretic Peptide    Collection Time: 12/20/18  1:15 AM   Result Value Ref Range    Pro-BNP 1537.0 0.0 - 1800.0 pg/mL   Procalcitonin    Collection Time: 12/20/18  1:15 AM   Result Value Ref Range    Procalcitonin 0.41 (H) 0.01 - 0.09 ng/mL   Anion Gap    Collection Time: 12/20/18  1:15 AM   Result Value Ref Range    Anion Gap 12.0 8.0 - 16.0 meq/L   Glomerular Filtration Rate, Estimated    Collection Time: 12/20/18  1:15 AM   Result Value Ref Range    Est, Glom Filt Rate 72 (A) ml/min/1.73m2   Culture blood #2    Collection Time: 12/20/18  1:20 AM   Result Value Ref Range    Blood Culture, Routine No growth-preliminary    Basic Metabolic Panel    Collection Time: 12/20/18  4:40 AM   Result Value Ref Range    Sodium 138 135 - 145 meq/L    Potassium 4.5 3.5 - 5.2 meq/L    Chloride 107 98 - 111 meq/L    CO2 20 (L) 23 - 33 meq/L    Glucose 537 (HH) 70 - 108 mg/dL    BUN 29 (H) 7 - 22 mg/dL    CREATININE 1.0

## 2018-12-22 NOTE — PROGRESS NOTES
[address] [place] [address]  Notice of Patient Passing      Patient Name- Davon Lowe Number- [de-identified]   Attending Physician- Mehran Cantu MD    Admitted on-12/20/2018  2:18 PM     On 12/22/2018 at [phone] patient was found in [phone] with:   Absence of vital signs. Absence of neurological response. Confirmed time of death at [phone]. Physician or On-call Physician notified of time of death- yes    Family present at time of death- yes,    Spiritual care present at time of death- no    Physician was notified and orders were obtained to release the body. Post-Mortem documentation completed; form printed, signed, and given to admitting.     Mayela Maciel RN Nursing Supervisor/ Manager  12/22/18   5:08 AM

## 2018-12-23 LAB
BLOOD CULTURE, ROUTINE: NORMAL
BLOOD CULTURE, ROUTINE: NORMAL

## 2018-12-24 LAB
AEROBIC CULTURE: ABNORMAL
AEROBIC CULTURE: ABNORMAL
ANAEROBIC CULTURE: ABNORMAL
GRAM STAIN RESULT: ABNORMAL
ORGANISM: ABNORMAL

## 2018-12-25 LAB
BLOOD CULTURE, ROUTINE: NORMAL

## (undated) DEVICE — CONMED SCOPE SAVER BITE BLOCK, 20X27 MM: Brand: SCOPE SAVER

## (undated) DEVICE — STAPLER SKIN STPL LN H1-2.5XL30MM VASC TISS 3 ROW 8 FIRING

## (undated) DEVICE — SHEET,DRAPE,3/4,53X77,STERILE: Brand: MEDLINE

## (undated) DEVICE — GLOVE SURG SZ 65 THK91MIL LTX FREE SYN POLYISOPRENE

## (undated) DEVICE — Device

## (undated) DEVICE — SOLUTION IV IRRIG WATER 1000ML POUR BRL 2F7114

## (undated) DEVICE — ENDO KIT: Brand: MEDLINE INDUSTRIES, INC.

## (undated) DEVICE — NEEDLE SPNL 22GA L3.5IN BLK HUB S STL REG WALL FIT STYL W/

## (undated) DEVICE — GLOVE ORANGE PI 7 1/2   MSG9075

## (undated) DEVICE — STAPLER INT 75MM CUT LN L73MM STPL LN L77MM LNAR B-FORM

## (undated) DEVICE — SOLUTION IV 1000ML 0.9% SOD CHL PH 5 INJ USP VIAFLX PLAS

## (undated) DEVICE — TUBING, SUCTION, 1/4" X 6', STRAIGHT: Brand: MEDLINE

## (undated) DEVICE — GAUZE,SPONGE,4"X4",12PLY,STERILE,LF,2'S: Brand: MEDLINE

## (undated) DEVICE — BONE TAMP KIT KEX152EB FF E2 15/2 OI: Brand: KYPHON EXPRESS II KYPHOPAK TRAY

## (undated) DEVICE — 6 ML SYRINGE LUER-LOCK TIP: Brand: MONOJECT

## (undated) DEVICE — RELOAD STPL L75MM OPN STPL H4.5MM CLS STPL H2MM WIRE

## (undated) DEVICE — NEEDLE SYR 18GA L1.5IN RED PLAS HUB S STL BLNT FILL W/O

## (undated) DEVICE — YANKAUER,POOLE TIP,STERILE,50/CS: Brand: MEDLINE

## (undated) DEVICE — SPONGE GZ W4XL4IN COT 12 PLY TYP VII WVN C FLD DSGN

## (undated) DEVICE — Z CONVERTED USE 2715898 SWABSTICK MEDICATED W1.75XL6.5IN 1.6ML 3.15% CHG 70% ISO

## (undated) DEVICE — Z DISCONTINUED USE 2537982 ELECTRODE DISPER W/ CRD DISP

## (undated) DEVICE — SET CATH 20GA L1.75IN RAD ART POLYUR RADPQ W/ INTEGR

## (undated) DEVICE — SOLUTION IV IRRIG POUR BRL 0.9% SODIUM CHL 2F7124

## (undated) DEVICE — 500ML,PRESSURE INFUSER W/STOPCOCK: Brand: MEDLINE

## (undated) DEVICE — GOWN,SIRUS,NON REINFRCD,LARGE,SET IN SL: Brand: MEDLINE

## (undated) DEVICE — PRESSURE MONITORING SET: Brand: TRUWAVE

## (undated) DEVICE — BLADE CLIPPER GEN PURP NS

## (undated) DEVICE — 3M™ WARMING BLANKET, UPPER BODY, 10 PER CASE, 42268: Brand: BAIR HUGGER™

## (undated) DEVICE — SPONGE LAP W18XL18IN WHT COT 4 PLY FLD STRUNG RADPQ DISP ST

## (undated) DEVICE — E-Z CLEAN, NON-STICK, PTFE COATED, ELECTROSURGICAL BLADE ELECTRODE, 6.5 INCH (16.5 CM): Brand: MEGADYNE

## (undated) DEVICE — TUBING PRSS 36 M F

## (undated) DEVICE — SPLINT ARMBRD W3XL10.5IN POLYFOAM DLX A LN

## (undated) DEVICE — 3M™ TRANSPORE™ WHITE SURGICAL TAPE 1534-2, 2 INCH X 10 YARD (5CM X 9,1M), 6 ROLLS/CARTON 10 CARTONS/CASE: Brand: 3M™ TRANSPORE™

## (undated) DEVICE — SINGLE USE BIOPSY VALVE MAJ-210: Brand: SINGLE USE BIOPSY VALVE (STERILE)

## (undated) DEVICE — BONE BIOPSY DEVICE F07A TAPERED SIZE 2: Brand: MEDTRONIC REUSABLE INSTRUMENTS

## (undated) DEVICE — JELLY,LUBE,STERILE,FLIP TOP,TUBE,2-OZ: Brand: MEDLINE

## (undated) DEVICE — SYRINGE MED 10ML LUERLOCK TIP W/O SFTY DISP

## (undated) DEVICE — APPLICATOR PREP 26ML 0.7% IOD POVACRYLEX 74% ISO ALC ST

## (undated) DEVICE — PACK PROCEDURE SURG SET UP SRMC

## (undated) DEVICE — TOWEL,OR,DSP,ST,BLUE,DLX,4/PK,20PK/CS: Brand: MEDLINE

## (undated) DEVICE — TOTAL TRAY, DB, 100% SILI FOLEY, 16FR 10: Brand: MEDLINE

## (undated) DEVICE — BLANKET THER AD W24XL60IN FAB COVERING SUP SFT ULT THN LTWT

## (undated) DEVICE — GOWN,AURORA,NON-REINFORCED,SMALL: Brand: MEDLINE

## (undated) DEVICE — CONTAINER,SPECIMEN,PNEU TUBE,4OZ,OR STRL: Brand: MEDLINE

## (undated) DEVICE — SET CATH 20GA L1.5IN RAD ART POLYUR RADPQ W/ INTEGR 0.018IN

## (undated) DEVICE — SINGLE USE SUCTION VALVE MAJ-209: Brand: SINGLE USE SUCTION VALVE (STERILE)

## (undated) DEVICE — SET LNR RED GRN W/ BASE CLEANASCOPE

## (undated) DEVICE — LINER SUCT CANSTR 1500CC SEMI RIG W/ POR HYDROPHOBIC SHUT

## (undated) DEVICE — SOLUTION IV 500ML 0.9% SOD CHL PH 5 INJ USP VIAFLX PLAS

## (undated) DEVICE — HYPODERMIC SAFETY NEEDLE: Brand: MAGELLAN

## (undated) DEVICE — SEALER TISS L20CM DIA13MM ADV BPLR L CRV JAW OPN APPRCH

## (undated) DEVICE — CHLORAPREP 26ML CLEAR

## (undated) DEVICE — 3M™ BAIR HUGGER® MULTI ACCESS BLANKET, PEDIATRIC, FULL BODY, 10 PER CASE 31000: Brand: BAIR HUGGER™

## (undated) DEVICE — CONNECTOR TBNG AUX H2O JET DISP FOR OLY 160/180 SER

## (undated) DEVICE — COVER ARMBRD W13XL28.5IN IMPERV BLU FOR OP RM

## (undated) DEVICE — GLOVE ORANGE PI 7   MSG9070

## (undated) DEVICE — BLADE LARYNSCP SZ 4 ENH DIR INTUB GLIDESCOPE MCGRATH MAC

## (undated) DEVICE — CURETTE A13A SIZE 2 T-TIP: Brand: KYPHON® EXPRESS ™ CURETTE

## (undated) DEVICE — DRAPE C ARM W36XL30IN RECTANG BND BG AND TAPE

## (undated) DEVICE — STAPLER INT STPL LN H1.8-4.8XL60MM THCK TISS 2 ROW 8 FIRING